# Patient Record
Sex: FEMALE | Race: WHITE | HISPANIC OR LATINO | Employment: FULL TIME | URBAN - METROPOLITAN AREA
[De-identification: names, ages, dates, MRNs, and addresses within clinical notes are randomized per-mention and may not be internally consistent; named-entity substitution may affect disease eponyms.]

---

## 2023-05-01 PROBLEM — N39.0 ACUTE UTI: Status: ACTIVE | Noted: 2023-05-01

## 2023-05-01 PROBLEM — Z86.010 HISTORY OF COLON POLYPS: Status: ACTIVE | Noted: 2023-05-01

## 2023-05-01 PROBLEM — F41.8 DEPRESSION WITH ANXIETY: Status: ACTIVE | Noted: 2023-05-01

## 2023-05-01 PROBLEM — J18.9 PNEUMONIA: Status: ACTIVE | Noted: 2023-05-01

## 2023-05-01 PROBLEM — M62.838 CERVICAL PARASPINOUS MUSCLE SPASM: Status: ACTIVE | Noted: 2023-05-01

## 2023-05-01 PROBLEM — R92.2 DENSE BREAST: Status: ACTIVE | Noted: 2023-05-01

## 2023-05-01 PROBLEM — J02.9 SORE THROAT: Status: ACTIVE | Noted: 2023-05-01

## 2023-05-01 PROBLEM — K62.1 RECTAL POLYP: Status: ACTIVE | Noted: 2023-05-01

## 2023-05-01 PROBLEM — R11.2 NAUSEA AND/OR VOMITING: Status: ACTIVE | Noted: 2023-05-01

## 2023-05-01 PROBLEM — R87.619 ABNORMAL PAP SMEAR OF CERVIX: Status: ACTIVE | Noted: 2023-05-01

## 2023-05-01 PROBLEM — R92.30 DENSE BREAST: Status: ACTIVE | Noted: 2023-05-01

## 2023-05-01 PROBLEM — K59.00 CONSTIPATION: Status: ACTIVE | Noted: 2023-05-01

## 2023-05-01 PROBLEM — F41.9 ANXIETY: Status: ACTIVE | Noted: 2023-05-01

## 2023-05-01 PROBLEM — B99.9 RECURRENT INFECTIONS: Status: ACTIVE | Noted: 2023-05-01

## 2023-05-01 PROBLEM — R06.2 WHEEZING: Status: ACTIVE | Noted: 2023-05-01

## 2023-05-01 PROBLEM — K76.0 NAFLD (NONALCOHOLIC FATTY LIVER DISEASE): Status: ACTIVE | Noted: 2023-05-01

## 2023-05-01 PROBLEM — M54.50 PAIN, LOW BACK: Status: ACTIVE | Noted: 2023-05-01

## 2023-05-01 PROBLEM — M54.16 LUMBAR RADICULOPATHY: Status: ACTIVE | Noted: 2023-05-01

## 2023-05-01 PROBLEM — N76.0 BACTERIAL VAGINOSIS: Status: ACTIVE | Noted: 2023-05-01

## 2023-05-01 PROBLEM — R82.90 ABNORMAL URINE ODOR: Status: ACTIVE | Noted: 2023-05-01

## 2023-05-01 PROBLEM — S16.1XXA CERVICAL STRAIN: Status: ACTIVE | Noted: 2023-05-01

## 2023-05-01 PROBLEM — M72.2 BILATERAL PLANTAR FASCIITIS: Status: ACTIVE | Noted: 2023-05-01

## 2023-05-01 PROBLEM — R16.0 HEPATOMEGALY: Status: ACTIVE | Noted: 2023-05-01

## 2023-05-01 PROBLEM — R06.02 SHORTNESS OF BREATH ON EXERTION: Status: ACTIVE | Noted: 2023-05-01

## 2023-05-01 PROBLEM — E55.9 VITAMIN D DEFICIENCY: Status: ACTIVE | Noted: 2023-05-01

## 2023-05-01 PROBLEM — Z20.822 SUSPECTED SEVERE ACUTE RESPIRATORY SYNDROME CORONAVIRUS 2 (SARS-COV-2) INFECTION: Status: ACTIVE | Noted: 2023-05-01

## 2023-05-01 PROBLEM — H66.93 BILATERAL OTITIS MEDIA: Status: ACTIVE | Noted: 2023-05-01

## 2023-05-01 PROBLEM — M16.12 OSTEOARTHRITIS OF LEFT HIP: Status: ACTIVE | Noted: 2023-05-01

## 2023-05-01 PROBLEM — R23.2 HOT FLASHES: Status: ACTIVE | Noted: 2023-05-01

## 2023-05-01 PROBLEM — B96.89 BACTERIAL VAGINOSIS: Status: ACTIVE | Noted: 2023-05-01

## 2023-05-01 PROBLEM — S92.153A AVULSION FRACTURE OF TALUS: Status: ACTIVE | Noted: 2023-05-01

## 2023-05-01 PROBLEM — G56.00 CTS (CARPAL TUNNEL SYNDROME): Status: ACTIVE | Noted: 2023-05-01

## 2023-05-01 PROBLEM — M25.559 ARTHRALGIA OF HIP: Status: ACTIVE | Noted: 2023-05-01

## 2023-05-01 PROBLEM — R63.5 WEIGHT GAIN: Status: ACTIVE | Noted: 2023-05-01

## 2023-05-01 PROBLEM — D69.6 THROMBOCYTOPENIA, UNSPECIFIED (CMS-HCC): Status: ACTIVE | Noted: 2023-05-01

## 2023-05-01 PROBLEM — J98.9 REACTIVE AIRWAY DISEASE THAT IS NOT ASTHMA: Status: ACTIVE | Noted: 2023-05-01

## 2023-05-01 PROBLEM — K21.9 GERD (GASTROESOPHAGEAL REFLUX DISEASE): Status: ACTIVE | Noted: 2023-05-01

## 2023-05-01 PROBLEM — A41.9 SEPSIS (MULTI): Status: ACTIVE | Noted: 2023-05-01

## 2023-05-01 PROBLEM — R35.0 URINARY FREQUENCY: Status: ACTIVE | Noted: 2023-05-01

## 2023-05-01 PROBLEM — R09.81 NASAL CONGESTION: Status: ACTIVE | Noted: 2023-05-01

## 2023-05-01 PROBLEM — E78.5 ELEVATED LIPIDS: Status: ACTIVE | Noted: 2023-05-01

## 2023-05-01 PROBLEM — J30.2 SEASONAL ALLERGIC RHINITIS: Status: ACTIVE | Noted: 2023-05-01

## 2023-05-01 PROBLEM — S93.409A ANKLE SPRAIN: Status: ACTIVE | Noted: 2023-05-01

## 2023-05-01 PROBLEM — J20.9 ACUTE BRONCHITIS: Status: ACTIVE | Noted: 2023-05-01

## 2023-05-01 PROBLEM — R53.83 OTHER FATIGUE: Status: ACTIVE | Noted: 2023-05-01

## 2023-05-01 PROBLEM — J06.9 URI, ACUTE: Status: ACTIVE | Noted: 2023-05-01

## 2023-05-01 PROBLEM — L50.0 ALLERGIC URTICARIA: Status: ACTIVE | Noted: 2023-05-01

## 2023-05-01 PROBLEM — S39.012A LUMBAR STRAIN: Status: ACTIVE | Noted: 2023-05-01

## 2023-05-01 PROBLEM — Z86.0100 HISTORY OF COLON POLYPS: Status: ACTIVE | Noted: 2023-05-01

## 2023-05-01 PROBLEM — M25.373 ANKLE INSTABILITY: Status: ACTIVE | Noted: 2023-05-01

## 2023-05-01 PROBLEM — M16.10 HIP ARTHRITIS: Status: ACTIVE | Noted: 2023-05-01

## 2023-05-01 PROBLEM — R07.81 RIB PAIN ON RIGHT SIDE: Status: ACTIVE | Noted: 2023-05-01

## 2023-05-01 PROBLEM — F51.02 ADJUSTMENT INSOMNIA: Status: ACTIVE | Noted: 2023-05-01

## 2023-05-01 PROBLEM — R79.89 ELEVATED LFTS: Status: ACTIVE | Noted: 2023-05-01

## 2023-05-01 PROBLEM — R10.11 ABDOMINAL PAIN, RUQ (RIGHT UPPER QUADRANT): Status: ACTIVE | Noted: 2023-05-01

## 2023-05-01 PROBLEM — M79.10 MYALGIA: Status: ACTIVE | Noted: 2023-05-01

## 2023-05-01 PROBLEM — M62.830 LUMBAR PARASPINAL MUSCLE SPASM: Status: ACTIVE | Noted: 2023-05-01

## 2023-05-01 PROBLEM — M79.603 PAIN OF UPPER EXTREMITY: Status: ACTIVE | Noted: 2023-05-01

## 2023-05-01 PROBLEM — A04.72 C. DIFFICILE DIARRHEA: Status: ACTIVE | Noted: 2023-05-01

## 2023-05-01 PROBLEM — R05.9 COUGH: Status: ACTIVE | Noted: 2023-05-01

## 2023-05-01 PROBLEM — R73.09 ELEVATED GLUCOSE: Status: ACTIVE | Noted: 2023-05-01

## 2023-05-01 PROBLEM — R60.0 LEG EDEMA: Status: ACTIVE | Noted: 2023-05-01

## 2023-05-01 PROBLEM — L23.7 POISON IVY DERMATITIS: Status: ACTIVE | Noted: 2023-05-01

## 2023-05-01 PROBLEM — R31.9 HEMATURIA: Status: ACTIVE | Noted: 2023-05-01

## 2023-05-01 RX ORDER — BROMPHENIRAMINE MALEATE, PSEUDOEPHEDRINE HYDROCHLORIDE, AND DEXTROMETHORPHAN HYDROBROMIDE 2; 30; 10 MG/5ML; MG/5ML; MG/5ML
10 SYRUP ORAL
COMMUNITY
Start: 2022-11-29 | End: 2023-05-03 | Stop reason: ALTCHOICE

## 2023-05-01 RX ORDER — PAROXETINE 10 MG/1
10 TABLET, FILM COATED ORAL EVERY 24 HOURS
COMMUNITY
Start: 2021-12-20 | End: 2023-05-03 | Stop reason: ALTCHOICE

## 2023-05-01 RX ORDER — ALBUTEROL SULFATE 90 UG/1
1 AEROSOL, METERED RESPIRATORY (INHALATION)
COMMUNITY
Start: 2020-02-18 | End: 2023-11-13 | Stop reason: SDUPTHER

## 2023-05-01 RX ORDER — BUPROPION HYDROCHLORIDE 150 MG/1
1 TABLET ORAL DAILY
COMMUNITY
Start: 2022-08-01 | End: 2023-05-03 | Stop reason: ALTCHOICE

## 2023-05-01 RX ORDER — PREDNISONE 20 MG/1
20 TABLET ORAL EVERY 24 HOURS
COMMUNITY
Start: 2021-12-01 | End: 2023-05-03 | Stop reason: ALTCHOICE

## 2023-05-01 RX ORDER — NAPROXEN 500 MG/1
500 TABLET ORAL EVERY 12 HOURS
COMMUNITY
Start: 2020-03-09 | End: 2024-04-10 | Stop reason: SDUPTHER

## 2023-05-01 RX ORDER — CYCLOBENZAPRINE HCL 10 MG
1 TABLET ORAL NIGHTLY PRN
COMMUNITY
Start: 2021-06-02 | End: 2024-01-17 | Stop reason: WASHOUT

## 2023-05-01 RX ORDER — BUDESONIDE AND FORMOTEROL FUMARATE DIHYDRATE 160; 4.5 UG/1; UG/1
2 AEROSOL RESPIRATORY (INHALATION) EVERY 12 HOURS
COMMUNITY
End: 2023-11-13 | Stop reason: SDUPTHER

## 2023-05-01 RX ORDER — BUDESONIDE AND FORMOTEROL FUMARATE DIHYDRATE 160; 4.5 UG/1; UG/1
2 AEROSOL RESPIRATORY (INHALATION) 2 TIMES DAILY
COMMUNITY
End: 2023-05-03 | Stop reason: ALTCHOICE

## 2023-05-01 RX ORDER — CETIRIZINE HYDROCHLORIDE 10 MG/1
10 TABLET ORAL DAILY
COMMUNITY
Start: 2023-01-24

## 2023-05-01 RX ORDER — ACETAMINOPHEN 500 MG
TABLET ORAL DAILY
COMMUNITY
Start: 2022-11-14

## 2023-05-01 RX ORDER — MULTIVITAMIN
1 TABLET ORAL DAILY
COMMUNITY

## 2023-05-03 ENCOUNTER — OFFICE VISIT (OUTPATIENT)
Dept: PRIMARY CARE | Facility: CLINIC | Age: 53
End: 2023-05-03
Payer: COMMERCIAL

## 2023-05-03 VITALS
TEMPERATURE: 98.2 F | SYSTOLIC BLOOD PRESSURE: 115 MMHG | WEIGHT: 180.9 LBS | HEIGHT: 63 IN | DIASTOLIC BLOOD PRESSURE: 80 MMHG | BODY MASS INDEX: 32.05 KG/M2 | HEART RATE: 84 BPM | OXYGEN SATURATION: 96 %

## 2023-05-03 DIAGNOSIS — R79.89 ELEVATED LFTS: ICD-10-CM

## 2023-05-03 DIAGNOSIS — E66.09 CLASS 1 OBESITY DUE TO EXCESS CALORIES WITHOUT SERIOUS COMORBIDITY WITH BODY MASS INDEX (BMI) OF 32.0 TO 32.9 IN ADULT: ICD-10-CM

## 2023-05-03 DIAGNOSIS — J45.902 ASTHMA WITH STATUS ASTHMATICUS, UNSPECIFIED ASTHMA SEVERITY, UNSPECIFIED WHETHER PERSISTENT (HHS-HCC): Primary | ICD-10-CM

## 2023-05-03 DIAGNOSIS — Z79.899 HIGH RISK MEDICATION USE: ICD-10-CM

## 2023-05-03 DIAGNOSIS — R73.09 ELEVATED GLUCOSE: ICD-10-CM

## 2023-05-03 DIAGNOSIS — R20.0 BILATERAL HAND NUMBNESS: ICD-10-CM

## 2023-05-03 DIAGNOSIS — K21.9 GASTROESOPHAGEAL REFLUX DISEASE WITHOUT ESOPHAGITIS: ICD-10-CM

## 2023-05-03 DIAGNOSIS — E55.9 VITAMIN D DEFICIENCY: ICD-10-CM

## 2023-05-03 DIAGNOSIS — F41.9 ANXIETY: ICD-10-CM

## 2023-05-03 DIAGNOSIS — E78.5 ELEVATED LIPIDS: ICD-10-CM

## 2023-05-03 DIAGNOSIS — M54.16 LUMBAR RADICULOPATHY: ICD-10-CM

## 2023-05-03 PROBLEM — S93.409A ANKLE SPRAIN: Status: RESOLVED | Noted: 2023-05-01 | Resolved: 2023-05-03

## 2023-05-03 PROBLEM — R60.0 LEG EDEMA: Status: RESOLVED | Noted: 2023-05-01 | Resolved: 2023-05-03

## 2023-05-03 PROBLEM — M79.603 PAIN OF UPPER EXTREMITY: Status: RESOLVED | Noted: 2023-05-01 | Resolved: 2023-05-03

## 2023-05-03 PROBLEM — M79.10 MYALGIA: Status: RESOLVED | Noted: 2023-05-01 | Resolved: 2023-05-03

## 2023-05-03 PROBLEM — N39.0 ACUTE UTI: Status: RESOLVED | Noted: 2023-05-01 | Resolved: 2023-05-03

## 2023-05-03 PROBLEM — S92.153A AVULSION FRACTURE OF TALUS: Status: RESOLVED | Noted: 2023-05-01 | Resolved: 2023-05-03

## 2023-05-03 PROBLEM — A41.9 SEPSIS (MULTI): Status: RESOLVED | Noted: 2023-05-01 | Resolved: 2023-05-03

## 2023-05-03 PROBLEM — M16.10 HIP ARTHRITIS: Status: RESOLVED | Noted: 2023-05-01 | Resolved: 2023-05-03

## 2023-05-03 PROBLEM — M25.559 ARTHRALGIA OF HIP: Status: RESOLVED | Noted: 2023-05-01 | Resolved: 2023-05-03

## 2023-05-03 PROBLEM — L50.0 ALLERGIC URTICARIA: Status: RESOLVED | Noted: 2023-05-01 | Resolved: 2023-05-03

## 2023-05-03 PROBLEM — R35.0 URINARY FREQUENCY: Status: RESOLVED | Noted: 2023-05-01 | Resolved: 2023-05-03

## 2023-05-03 PROBLEM — R05.9 COUGH: Status: RESOLVED | Noted: 2023-05-01 | Resolved: 2023-05-03

## 2023-05-03 PROBLEM — J30.2 SEASONAL ALLERGIC RHINITIS: Status: RESOLVED | Noted: 2023-05-01 | Resolved: 2023-05-03

## 2023-05-03 PROBLEM — B99.9 RECURRENT INFECTIONS: Status: RESOLVED | Noted: 2023-05-01 | Resolved: 2023-05-03

## 2023-05-03 PROBLEM — J02.9 SORE THROAT: Status: RESOLVED | Noted: 2023-05-01 | Resolved: 2023-05-03

## 2023-05-03 PROBLEM — R11.2 NAUSEA AND/OR VOMITING: Status: RESOLVED | Noted: 2023-05-01 | Resolved: 2023-05-03

## 2023-05-03 PROBLEM — J20.9 ACUTE BRONCHITIS: Status: RESOLVED | Noted: 2023-05-01 | Resolved: 2023-05-03

## 2023-05-03 PROBLEM — G56.00 CTS (CARPAL TUNNEL SYNDROME): Status: RESOLVED | Noted: 2023-05-01 | Resolved: 2023-05-03

## 2023-05-03 PROBLEM — M54.50 PAIN, LOW BACK: Status: RESOLVED | Noted: 2023-05-01 | Resolved: 2023-05-03

## 2023-05-03 PROBLEM — S16.1XXA CERVICAL STRAIN: Status: RESOLVED | Noted: 2023-05-01 | Resolved: 2023-05-03

## 2023-05-03 PROBLEM — Z20.822 SUSPECTED SEVERE ACUTE RESPIRATORY SYNDROME CORONAVIRUS 2 (SARS-COV-2) INFECTION: Status: RESOLVED | Noted: 2023-05-01 | Resolved: 2023-05-03

## 2023-05-03 PROBLEM — J06.9 URI, ACUTE: Status: RESOLVED | Noted: 2023-05-01 | Resolved: 2023-05-03

## 2023-05-03 PROBLEM — M25.373 ANKLE INSTABILITY: Status: RESOLVED | Noted: 2023-05-01 | Resolved: 2023-05-03

## 2023-05-03 PROBLEM — R82.90 ABNORMAL URINE ODOR: Status: RESOLVED | Noted: 2023-05-01 | Resolved: 2023-05-03

## 2023-05-03 PROBLEM — R92.2 DENSE BREAST: Status: RESOLVED | Noted: 2023-05-01 | Resolved: 2023-05-03

## 2023-05-03 PROBLEM — R07.81 RIB PAIN ON RIGHT SIDE: Status: RESOLVED | Noted: 2023-05-01 | Resolved: 2023-05-03

## 2023-05-03 PROBLEM — R09.81 NASAL CONGESTION: Status: RESOLVED | Noted: 2023-05-01 | Resolved: 2023-05-03

## 2023-05-03 PROBLEM — B96.89 BACTERIAL VAGINOSIS: Status: RESOLVED | Noted: 2023-05-01 | Resolved: 2023-05-03

## 2023-05-03 PROBLEM — R53.83 OTHER FATIGUE: Status: RESOLVED | Noted: 2023-05-01 | Resolved: 2023-05-03

## 2023-05-03 PROBLEM — R10.11 ABDOMINAL PAIN, RUQ (RIGHT UPPER QUADRANT): Status: RESOLVED | Noted: 2023-05-01 | Resolved: 2023-05-03

## 2023-05-03 PROBLEM — M62.838 CERVICAL PARASPINOUS MUSCLE SPASM: Status: RESOLVED | Noted: 2023-05-01 | Resolved: 2023-05-03

## 2023-05-03 PROBLEM — M72.2 BILATERAL PLANTAR FASCIITIS: Status: RESOLVED | Noted: 2023-05-01 | Resolved: 2023-05-03

## 2023-05-03 PROBLEM — R06.02 SHORTNESS OF BREATH ON EXERTION: Status: RESOLVED | Noted: 2023-05-01 | Resolved: 2023-05-03

## 2023-05-03 PROBLEM — L23.7 POISON IVY DERMATITIS: Status: RESOLVED | Noted: 2023-05-01 | Resolved: 2023-05-03

## 2023-05-03 PROBLEM — H66.93 BILATERAL OTITIS MEDIA: Status: RESOLVED | Noted: 2023-05-01 | Resolved: 2023-05-03

## 2023-05-03 PROBLEM — R31.9 HEMATURIA: Status: RESOLVED | Noted: 2023-05-01 | Resolved: 2023-05-03

## 2023-05-03 PROBLEM — J98.9 REACTIVE AIRWAY DISEASE THAT IS NOT ASTHMA: Status: RESOLVED | Noted: 2023-05-01 | Resolved: 2023-05-03

## 2023-05-03 PROBLEM — N76.0 BACTERIAL VAGINOSIS: Status: RESOLVED | Noted: 2023-05-01 | Resolved: 2023-05-03

## 2023-05-03 PROBLEM — R92.30 DENSE BREAST: Status: RESOLVED | Noted: 2023-05-01 | Resolved: 2023-05-03

## 2023-05-03 PROBLEM — J18.9 PNEUMONIA: Status: RESOLVED | Noted: 2023-05-01 | Resolved: 2023-05-03

## 2023-05-03 LAB
AMPHETAMINE (PRESENCE) IN URINE BY SCREEN METHOD: NORMAL
BARBITURATES PRESENCE IN URINE BY SCREEN METHOD: NORMAL
BENZODIAZEPINE (PRESENCE) IN URINE BY SCREEN METHOD: NORMAL
CANNABINOIDS IN URINE BY SCREEN METHOD: NORMAL
COCAINE (PRESENCE) IN URINE BY SCREEN METHOD: NORMAL
DRUG SCREEN COMMENT URINE: NORMAL
FENTANYL URINE: NORMAL
METHADONE (PRESENCE) IN URINE BY SCREEN METHOD: NORMAL
OPIATES (PRESENCE) IN URINE BY SCREEN METHOD: NORMAL
OXYCODONE (PRESENCE) IN URINE BY SCREEN METHOD: NORMAL
PHENCYCLIDINE (PRESENCE) IN URINE BY SCREEN METHOD: NORMAL

## 2023-05-03 PROCEDURE — 99214 OFFICE O/P EST MOD 30 MIN: CPT | Performed by: PHYSICIAN ASSISTANT

## 2023-05-03 PROCEDURE — 3008F BODY MASS INDEX DOCD: CPT | Performed by: PHYSICIAN ASSISTANT

## 2023-05-03 PROCEDURE — 1036F TOBACCO NON-USER: CPT | Performed by: PHYSICIAN ASSISTANT

## 2023-05-03 PROCEDURE — 80307 DRUG TEST PRSMV CHEM ANLYZR: CPT

## 2023-05-03 RX ORDER — PHENTERMINE HYDROCHLORIDE 37.5 MG/1
37.5 TABLET ORAL
Qty: 30 TABLET | Refills: 0 | Status: SHIPPED | OUTPATIENT
Start: 2023-05-03 | End: 2023-05-31 | Stop reason: SDUPTHER

## 2023-05-03 RX ORDER — METHYLPREDNISOLONE 4 MG/1
TABLET ORAL
COMMUNITY
Start: 2022-10-28 | End: 2023-05-03 | Stop reason: ALTCHOICE

## 2023-05-03 RX ORDER — PHENTERMINE HYDROCHLORIDE 37.5 MG/1
37.5 TABLET ORAL DAILY
COMMUNITY
Start: 2022-07-01 | End: 2023-05-03 | Stop reason: ALTCHOICE

## 2023-05-03 RX ORDER — ALPRAZOLAM 0.25 MG/1
0.25 TABLET ORAL NIGHTLY PRN
COMMUNITY

## 2023-05-03 RX ORDER — LANOLIN ALCOHOL/MO/W.PET/CERES
1 CREAM (GRAM) TOPICAL 2 TIMES DAILY
COMMUNITY
Start: 2023-01-24

## 2023-05-03 ASSESSMENT — PATIENT HEALTH QUESTIONNAIRE - PHQ9
SUM OF ALL RESPONSES TO PHQ9 QUESTIONS 1 AND 2: 0
1. LITTLE INTEREST OR PLEASURE IN DOING THINGS: NOT AT ALL
2. FEELING DOWN, DEPRESSED OR HOPELESS: NOT AT ALL

## 2023-05-24 PROBLEM — R00.2 PALPITATIONS: Status: ACTIVE | Noted: 2023-05-24

## 2023-05-24 PROBLEM — I20.9 ANGINA PECTORIS SYNDROME (CMS-HCC): Status: ACTIVE | Noted: 2023-05-24

## 2023-05-24 PROBLEM — R29.898 WEAKNESS OF BOTH HANDS: Status: ACTIVE | Noted: 2023-05-24

## 2023-05-24 PROBLEM — R22.32 MASS OF LEFT AXILLA: Status: ACTIVE | Noted: 2023-05-24

## 2023-05-24 PROBLEM — R22.2 MASS OF THORACIC STRUCTURE: Status: ACTIVE | Noted: 2023-05-24

## 2023-05-24 RX ORDER — ZINC GLUCONATE 50 MG
1 TABLET ORAL DAILY
COMMUNITY

## 2023-05-24 RX ORDER — ASCORBIC ACID 500 MG
1 TABLET ORAL DAILY
COMMUNITY

## 2023-05-24 RX ORDER — LANOLIN ALCOHOL/MO/W.PET/CERES
1 CREAM (GRAM) TOPICAL DAILY
COMMUNITY

## 2023-05-31 ENCOUNTER — OFFICE VISIT (OUTPATIENT)
Dept: PRIMARY CARE | Facility: CLINIC | Age: 53
End: 2023-05-31
Payer: COMMERCIAL

## 2023-05-31 VITALS
TEMPERATURE: 98.4 F | WEIGHT: 179.8 LBS | OXYGEN SATURATION: 97 % | DIASTOLIC BLOOD PRESSURE: 84 MMHG | BODY MASS INDEX: 31.86 KG/M2 | SYSTOLIC BLOOD PRESSURE: 122 MMHG | HEIGHT: 63 IN | HEART RATE: 95 BPM

## 2023-05-31 DIAGNOSIS — Z79.899 HIGH RISK MEDICATION USE: ICD-10-CM

## 2023-05-31 DIAGNOSIS — E66.09 CLASS 1 OBESITY DUE TO EXCESS CALORIES WITHOUT SERIOUS COMORBIDITY WITH BODY MASS INDEX (BMI) OF 32.0 TO 32.9 IN ADULT: Primary | ICD-10-CM

## 2023-05-31 PROCEDURE — 99213 OFFICE O/P EST LOW 20 MIN: CPT | Performed by: PHYSICIAN ASSISTANT

## 2023-05-31 PROCEDURE — 1036F TOBACCO NON-USER: CPT | Performed by: PHYSICIAN ASSISTANT

## 2023-05-31 PROCEDURE — 3008F BODY MASS INDEX DOCD: CPT | Performed by: PHYSICIAN ASSISTANT

## 2023-05-31 RX ORDER — PHENTERMINE HYDROCHLORIDE 37.5 MG/1
37.5 TABLET ORAL
Qty: 30 TABLET | Refills: 0 | Status: SHIPPED | OUTPATIENT
Start: 2023-05-31 | End: 2023-06-28 | Stop reason: SDUPTHER

## 2023-05-31 NOTE — PROGRESS NOTES
Subjective   Patient ID: Zainab Lagunas is a 53 y.o. female who presents for Follow-up on Adipex. This is the start on month 2.     HPI     OARRS:  Radha Quiroz PA-C on 5/31/2023 10:46 AM  I have personally reviewed the OARRS report for Zainab Lagunas. I have considered the risks of abuse, dependence, addiction and diversion    Is the patient prescribed a combination of a benzodiazepine and opioid?  No    Last Urine Drug Screen / ordered today: Yes  Recent Results (from the past 27470 hour(s))   Drug Screen, Urine With Reflex to Confirmation    Collection Time: 05/03/23 11:50 AM   Result Value Ref Range    DRUG SCREEN COMMENT URINE SEE BELOW     Amphetamine Screen, Urine PRESUMPTIVE NEGATIVE NEGATIVE    Barbiturate Screen, Urine PRESUMPTIVE NEGATIVE NEGATIVE    BENZODIAZEPINE (PRESENCE) IN URINE BY SCREEN METHOD PRESUMPTIVE NEGATIVE NEGATIVE    Cannabinoid Screen, Urine PRESUMPTIVE NEGATIVE NEGATIVE    Cocaine Screen, Urine PRESUMPTIVE NEGATIVE NEGATIVE    Fentanyl, Ur PRESUMPTIVE NEGATIVE NEGATIVE    Methadone Screen, Urine PRESUMPTIVE NEGATIVE NEGATIVE    Opiate Screen, Urine PRESUMPTIVE NEGATIVE NEGATIVE    Oxycodone Screen, Ur PRESUMPTIVE NEGATIVE NEGATIVE    PCP Screen, Urine PRESUMPTIVE NEGATIVE NEGATIVE     Results are as expected.     Controlled Substance Agreement:  Date of the Last Agreement: 5/3/23  Reviewed Controlled Substance Agreement including but not limited to the benefits, risks, and alternatives to treatment with a Controlled Substance medication(s).    Anorexiants:   What is the patient's goal of therapy? Weight loss  Is this being achieved with current treatment? Not really    I have assessed the presence or absence of contraindications, adverse effects, and indicators of possible substance abuse that would necessitate cessation of treatment utilizing controlled substance.    Patient has demonstrated continued efforts to lose weight, is dedicated to the treatment program and the  response to treatment.    Activities of Daily Living:   Is your overall impression that this patient is benefiting (symptom reduction outweighs side effects) from anorexiants therapy? Yes     1. Physical Functioning: Same  2. Family Relationship: Same  3. Social Relationship: Same  4. Mood: Same  5. Sleep Patterns: Same  6. Overall Function: Same        Obesity- BMI 33.  Wants to restart Adipex. Her last RX was 7/1/23, last pill 8/1/23. She denies SE from previous use.         OARRS:  Radha Quiroz PA-C on 5/3/2023  9:41 AM  I have personally reviewed the OARRS report for Zainab Lagunas. I have considered the risks of abuse, dependence, addiction and diversion     Is the patient prescribed a combination of a benzodiazepine and opioid?  No     Last Urine Drug Screen / ordered today: Yes  Recent Results         Recent Results (from the past 71535 hour(s))   Drug Screen, Urine With Reflex to Confirmation     Collection Time: 12/29/22  9:50 AM   Result Value Ref Range     DRUG SCREEN COMMENT URINE SEE BELOW       Amphetamine Screen, Urine PRESUMPTIVE NEGATIVE NEGATIVE     Barbiturate Screen, Urine PRESUMPTIVE NEGATIVE NEGATIVE     BENZODIAZEPINE (PRESENCE) IN URINE BY SCREEN METHOD PRESUMPTIVE NEGATIVE NEGATIVE     Cannabinoid Screen, Urine PRESUMPTIVE NEGATIVE NEGATIVE     Cocaine Screen, Urine PRESUMPTIVE NEGATIVE NEGATIVE     Fentanyl, Ur PRESUMPTIVE NEGATIVE NEGATIVE     Methadone Screen, Urine PRESUMPTIVE NEGATIVE NEGATIVE     Opiate Screen, Urine PRESUMPTIVE NEGATIVE NEGATIVE     Oxycodone Screen, Ur PRESUMPTIVE NEGATIVE NEGATIVE     PCP Screen, Urine PRESUMPTIVE NEGATIVE NEGATIVE         N/A     Controlled Substance Agreement:  Date of the Last Agreement: today, 5/3/23  Reviewed Controlled Substance Agreement including but not limited to the benefits, risks, and alternatives to treatment with a Controlled Substance medication(s).     Anorexiants:   What is the patient's goal of therapy? Weight loss, she  "has lost 4# since her last visit  Is this being achieved with current treatment? Yes, but too slow     I have assessed the patient's continuing efforts to lose weight.     Patient has demonstrated continued efforts to lose weight, is dedicated to the treatment program and the response to treatment.     Activities of Daily Living:   Is your overall impression that this patient is benefiting (symptom reduction outweighs side effects) from anorexiants therapy? N/A     1. Physical Functioning: Same  2. Family Relationship: Same  3. Social Relationship: Same  4. Mood: Same  5. Sleep Patterns: Same  6. Overall Function: Same     Mammo done Jan '23  DEXA done '21, due in '24  Colon '22, aldo 5 y        Review of Systems  Constitutional: Patient denies any fever, chills, loss of appetite, or unexplained weight loss.  Cardiovascular: Patient denies any chest pain, shortness of breath with exertion, tachycardia, palpitations, orthopnea, or paroxysmal nocturnal dyspnea.  Respiratory: Patient denies any cough, shortness breath, or wheezing.  Gastrointestinal patient denies any nausea, vomiting, diarrhea, constipation, melena, hematochezia, or reflux symptoms  Skin: Denies any rashes or skin lesions   Neurology: Patient denies any new motor or sensory losses.  Denies any numbness, tingling, weakness, and incoordination of the extremities.  Patient also denies any tremor, seizures, or gait instability.  Endocrinology: Denies any polyuria, polydipsia, polyphagia, or heat/cold intolerance.    Objective   /84   Pulse 95   Temp 36.9 °C (98.4 °F)   Ht 1.6 m (5' 3\")   Wt 81.6 kg (179 lb 12.8 oz)   SpO2 97%   BMI 31.85 kg/m²     Physical Exam  Gen. appearance: Alert and cooperative, in no acute distress, Well dev, well nourished obese female.  Neck: Supple and without adenopathy or rigidity.  There is no JVD at 90° and no carotid bruits are noted.  There is no thyromegaly, thyroid tenderness, or palpable thyroid " nodules.  Heart: Regular rate and rhythm without murmur or ectopy.  Lungs: Lungs are clear to auscultation bilaterally with good air exchange.  Skin: Good turgor, moist, warm and without rashes or lesions.  Neurological exam: Alert and oriented ×3, no tremor, normal gait.  Extremities: No clubbing, cyanosis, or edema    Assessment/Plan   Diagnoses and all orders for this visit:  Class 1 obesity due to excess calories without serious comorbidity with body mass index (BMI) of 32.0 to 32.9 in adult  -     phentermine (Adipex-P) 37.5 mg tablet; Take 1 tablet (37.5 mg) by mouth once daily in the morning. Take before meals.  This is pt's second month of Adipex. We discussed more protein in her diet and for her to con't with exercise. She only lost .2 lbs since her last visit. The goal is at least 5 lbs.     High risk medication use- Oaars, CSC, UDS are UTD.     RTC in 1 mo.    Patient understands that should they have testing outside   facilities that we may not receive the results and was told to call us if they have not heard from our office within a week after testing.    [facility] uses voice recognition technology for dictations. Sometimes the software misinterprets words. Please take this into account when reading this.

## 2023-06-02 ENCOUNTER — LAB (OUTPATIENT)
Dept: LAB | Facility: LAB | Age: 53
End: 2023-06-02
Payer: COMMERCIAL

## 2023-06-02 DIAGNOSIS — R79.89 ELEVATED LFTS: ICD-10-CM

## 2023-06-02 DIAGNOSIS — E78.5 ELEVATED LIPIDS: ICD-10-CM

## 2023-06-02 DIAGNOSIS — E55.9 VITAMIN D DEFICIENCY: ICD-10-CM

## 2023-06-02 DIAGNOSIS — R73.09 ELEVATED GLUCOSE: ICD-10-CM

## 2023-06-02 LAB
ALANINE AMINOTRANSFERASE (SGPT) (U/L) IN SER/PLAS: 15 U/L (ref 7–45)
ALBUMIN (G/DL) IN SER/PLAS: 4.5 G/DL (ref 3.4–5)
ALKALINE PHOSPHATASE (U/L) IN SER/PLAS: 62 U/L (ref 33–110)
ANION GAP IN SER/PLAS: 9 MMOL/L (ref 10–20)
ASPARTATE AMINOTRANSFERASE (SGOT) (U/L) IN SER/PLAS: 15 U/L (ref 9–39)
BASOPHILS (10*3/UL) IN BLOOD BY AUTOMATED COUNT: 0.08 X10E9/L (ref 0–0.1)
BASOPHILS/100 LEUKOCYTES IN BLOOD BY AUTOMATED COUNT: 1.1 % (ref 0–2)
BILIRUBIN TOTAL (MG/DL) IN SER/PLAS: 0.6 MG/DL (ref 0–1.2)
CALCIUM (MG/DL) IN SER/PLAS: 9.2 MG/DL (ref 8.6–10.3)
CARBON DIOXIDE, TOTAL (MMOL/L) IN SER/PLAS: 28 MMOL/L (ref 21–32)
CHLORIDE (MMOL/L) IN SER/PLAS: 103 MMOL/L (ref 98–107)
CHOLESTEROL (MG/DL) IN SER/PLAS: 207 MG/DL (ref 0–199)
CHOLESTEROL IN HDL (MG/DL) IN SER/PLAS: 46.3 MG/DL
CHOLESTEROL/HDL RATIO: 4.5
CREATININE (MG/DL) IN SER/PLAS: 0.63 MG/DL (ref 0.5–1.05)
EOSINOPHILS (10*3/UL) IN BLOOD BY AUTOMATED COUNT: 0.38 X10E9/L (ref 0–0.7)
EOSINOPHILS/100 LEUKOCYTES IN BLOOD BY AUTOMATED COUNT: 5.1 % (ref 0–6)
ERYTHROCYTE DISTRIBUTION WIDTH (RATIO) BY AUTOMATED COUNT: 13.6 % (ref 11.5–14.5)
ERYTHROCYTE MEAN CORPUSCULAR HEMOGLOBIN CONCENTRATION (G/DL) BY AUTOMATED: 32.5 G/DL (ref 32–36)
ERYTHROCYTE MEAN CORPUSCULAR VOLUME (FL) BY AUTOMATED COUNT: 96 FL (ref 80–100)
ERYTHROCYTES (10*6/UL) IN BLOOD BY AUTOMATED COUNT: 4.32 X10E12/L (ref 4–5.2)
ESTIMATED AVERAGE GLUCOSE FOR HBA1C: 111 MG/DL
GFR FEMALE: >90 ML/MIN/1.73M2
GLUCOSE (MG/DL) IN SER/PLAS: 93 MG/DL (ref 74–99)
HEMATOCRIT (%) IN BLOOD BY AUTOMATED COUNT: 41.6 % (ref 36–46)
HEMOGLOBIN (G/DL) IN BLOOD: 13.5 G/DL (ref 12–16)
HEMOGLOBIN A1C/HEMOGLOBIN TOTAL IN BLOOD: 5.5 %
IMMATURE GRANULOCYTES/100 LEUKOCYTES IN BLOOD BY AUTOMATED COUNT: 0.1 % (ref 0–0.9)
LDL: 145 MG/DL (ref 0–99)
LEUKOCYTES (10*3/UL) IN BLOOD BY AUTOMATED COUNT: 7.4 X10E9/L (ref 4.4–11.3)
LYMPHOCYTES (10*3/UL) IN BLOOD BY AUTOMATED COUNT: 2.22 X10E9/L (ref 1.2–4.8)
LYMPHOCYTES/100 LEUKOCYTES IN BLOOD BY AUTOMATED COUNT: 30 % (ref 13–44)
MONOCYTES (10*3/UL) IN BLOOD BY AUTOMATED COUNT: 0.6 X10E9/L (ref 0.1–1)
MONOCYTES/100 LEUKOCYTES IN BLOOD BY AUTOMATED COUNT: 8.1 % (ref 2–10)
NEUTROPHILS (10*3/UL) IN BLOOD BY AUTOMATED COUNT: 4.1 X10E9/L (ref 1.2–7.7)
NEUTROPHILS/100 LEUKOCYTES IN BLOOD BY AUTOMATED COUNT: 55.6 % (ref 40–80)
PLATELETS (10*3/UL) IN BLOOD AUTOMATED COUNT: 224 X10E9/L (ref 150–450)
POTASSIUM (MMOL/L) IN SER/PLAS: 3.6 MMOL/L (ref 3.5–5.3)
PROTEIN TOTAL: 7.3 G/DL (ref 6.4–8.2)
SODIUM (MMOL/L) IN SER/PLAS: 136 MMOL/L (ref 136–145)
THYROTROPIN (MIU/L) IN SER/PLAS BY DETECTION LIMIT <= 0.05 MIU/L: 2.61 MIU/L (ref 0.44–3.98)
TRIGLYCERIDE (MG/DL) IN SER/PLAS: 77 MG/DL (ref 0–149)
UREA NITROGEN (MG/DL) IN SER/PLAS: 12 MG/DL (ref 6–23)
VLDL: 15 MG/DL (ref 0–40)

## 2023-06-02 PROCEDURE — 80061 LIPID PANEL: CPT

## 2023-06-02 PROCEDURE — 80053 COMPREHEN METABOLIC PANEL: CPT

## 2023-06-02 PROCEDURE — 82652 VIT D 1 25-DIHYDROXY: CPT

## 2023-06-02 PROCEDURE — 84443 ASSAY THYROID STIM HORMONE: CPT

## 2023-06-02 PROCEDURE — 36415 COLL VENOUS BLD VENIPUNCTURE: CPT

## 2023-06-02 PROCEDURE — 85025 COMPLETE CBC W/AUTO DIFF WBC: CPT

## 2023-06-02 PROCEDURE — 83036 HEMOGLOBIN GLYCOSYLATED A1C: CPT

## 2023-06-05 ENCOUNTER — TELEPHONE (OUTPATIENT)
Dept: PRIMARY CARE | Facility: CLINIC | Age: 53
End: 2023-06-05
Payer: COMMERCIAL

## 2023-06-05 LAB — VITAMIN D 1,25-DIHYDROXY: 38.9 PG/ML (ref 19.9–79.3)

## 2023-06-05 NOTE — TELEPHONE ENCOUNTER
----- Message from Radha Quiroz PA-C sent at 6/5/2023  1:13 PM EDT -----  Please call the patient regarding her labs, they're stable. We can discuss more at her f/up if she has questions. She will keep all meds  the same.

## 2023-06-07 ENCOUNTER — TELEPHONE (OUTPATIENT)
Dept: PRIMARY CARE | Facility: CLINIC | Age: 53
End: 2023-06-07
Payer: COMMERCIAL

## 2023-06-07 NOTE — TELEPHONE ENCOUNTER
----- Message from Radha Quiroz PA-C sent at 6/7/2023  4:28 PM EDT -----  Regarding: nerve conduction study results.  Pls tell pt that her nerve conduction study showed mild left carpal tunnel.  They recommended that she try conservative management with hand therapy wrist splints.  If her symptoms worsen she is to get decompression or surgery on her left hand.  If it is persistent they suggest repeating it in 6 months.

## 2023-06-28 ENCOUNTER — OFFICE VISIT (OUTPATIENT)
Dept: PRIMARY CARE | Facility: CLINIC | Age: 53
End: 2023-06-28
Payer: COMMERCIAL

## 2023-06-28 VITALS
DIASTOLIC BLOOD PRESSURE: 79 MMHG | SYSTOLIC BLOOD PRESSURE: 117 MMHG | BODY MASS INDEX: 31.01 KG/M2 | WEIGHT: 175 LBS | HEART RATE: 88 BPM | HEIGHT: 63 IN | TEMPERATURE: 98.3 F

## 2023-06-28 DIAGNOSIS — Z79.899 HIGH RISK MEDICATION USE: ICD-10-CM

## 2023-06-28 DIAGNOSIS — E66.09 CLASS 1 OBESITY DUE TO EXCESS CALORIES WITHOUT SERIOUS COMORBIDITY WITH BODY MASS INDEX (BMI) OF 32.0 TO 32.9 IN ADULT: Primary | ICD-10-CM

## 2023-06-28 DIAGNOSIS — E78.5 ELEVATED LIPIDS: ICD-10-CM

## 2023-06-28 PROCEDURE — 99213 OFFICE O/P EST LOW 20 MIN: CPT | Performed by: PHYSICIAN ASSISTANT

## 2023-06-28 PROCEDURE — 1036F TOBACCO NON-USER: CPT | Performed by: PHYSICIAN ASSISTANT

## 2023-06-28 PROCEDURE — 3008F BODY MASS INDEX DOCD: CPT | Performed by: PHYSICIAN ASSISTANT

## 2023-06-28 RX ORDER — PHENTERMINE HYDROCHLORIDE 37.5 MG/1
37.5 TABLET ORAL
Qty: 30 TABLET | Refills: 0 | Status: SHIPPED | OUTPATIENT
Start: 2023-06-28 | End: 2024-01-17 | Stop reason: WASHOUT

## 2023-06-28 ASSESSMENT — PATIENT HEALTH QUESTIONNAIRE - PHQ9
2. FEELING DOWN, DEPRESSED OR HOPELESS: NOT AT ALL
1. LITTLE INTEREST OR PLEASURE IN DOING THINGS: NOT AT ALL
SUM OF ALL RESPONSES TO PHQ9 QUESTIONS 1 AND 2: 0

## 2023-06-28 NOTE — PROGRESS NOTES
Subjective   Patient ID: Zainab Lagunas is a 53 y.o. female who presents for Follow-up.    HPI     OARRS:  Radha Quiroz PA-C on 6/28/2023  3:18 PM  I have personally reviewed the OARRS report for Zainab Lagunas. I have considered the risks of abuse, dependence, addiction and diversion    Is the patient prescribed a combination of a benzodiazepine and opioid?  NO    Last Urine Drug Screen / ordered today: No  Recent Results (from the past 19680 hour(s))   Drug Screen, Urine With Reflex to Confirmation    Collection Time: 05/03/23 11:50 AM   Result Value Ref Range    DRUG SCREEN COMMENT URINE SEE BELOW     Amphetamine Screen, Urine PRESUMPTIVE NEGATIVE NEGATIVE    Barbiturate Screen, Urine PRESUMPTIVE NEGATIVE NEGATIVE    BENZODIAZEPINE (PRESENCE) IN URINE BY SCREEN METHOD PRESUMPTIVE NEGATIVE NEGATIVE    Cannabinoid Screen, Urine PRESUMPTIVE NEGATIVE NEGATIVE    Cocaine Screen, Urine PRESUMPTIVE NEGATIVE NEGATIVE    Fentanyl, Ur PRESUMPTIVE NEGATIVE NEGATIVE    Methadone Screen, Urine PRESUMPTIVE NEGATIVE NEGATIVE    Opiate Screen, Urine PRESUMPTIVE NEGATIVE NEGATIVE    Oxycodone Screen, Ur PRESUMPTIVE NEGATIVE NEGATIVE    PCP Screen, Urine PRESUMPTIVE NEGATIVE NEGATIVE     Results are as expected.     Controlled Substance Agreement:  Date of the Last Agreement: 5/3/23  Reviewed Controlled Substance Agreement including but not limited to the benefits, risks, and alternatives to treatment with a Controlled Substance medication(s).    Anorexiants:   What is the patient's goal of therapy? 10-15 # wt loss  Is this being achieved with current treatment? yes    I have assessed the patient's continuing efforts to lose weight., I have assessed the patient's dedication to the treatment program and the response to treatment., and I have assessed the presence or absence of contraindications, adverse effects, and indicators of possible substance abuse that would necessitate cessation of treatment utilizing  controlled substance.    Patient has demonstrated continued efforts to lose weight, is dedicated to the treatment program and the response to treatment. and I have assessed for the presence or absence of contraindications, adverse effects, and indicators of possible substance abuse that would necessitate cessation of treatment utilizing controlled substance.    Activities of Daily Living:   Is your overall impression that this patient is benefiting (symptom reduction outweighs side effects) from anorexiants therapy? Yes     1. Physical Functioning: Better  2. Family Relationship: Better  3. Social Relationship: Better  4. Mood: Better  5. Sleep Patterns: Better  6. Overall Function: Better        Labs done 6/2/23 here for rv.  HLD-  Rv'd labs and she is not wanting to go on meds for cholesterol. She is only wanting to do diet and exercise.      Obesity- BMI 33.  This is her last fill for Adipex (month #3). Her last RX was 5/31/23. She denies SE from previous use but  Dry mouth noted at this visit. She denies palpitations, CP, insomnia. Her goal is 5-8# with this last month.      Review of Systems  Constitutional: Patient denies any fever, chills, loss of appetite, or unexplained weight loss.  Cardiovascular: Patient denies any chest pain, shortness of breath with exertion, tachycardia, palpitations, orthopnea, or paroxysmal nocturnal dyspnea.  Respiratory: Patient denies any cough, shortness breath, or wheezing.  Gastrointestinal patient denies any nausea, vomiting, diarrhea, constipation, melena, hematochezia, or reflux symptoms  Skin: Denies any rashes or skin lesions   Neurology: Patient denies any new motor or sensory losses.  Denies any numbness, tingling, weakness, and incoordination of the extremities.  Patient also denies any tremor, seizures, or gait instability.  Endocrinology: Denies any polyuria, polydipsia, polyphagia, or heat/cold intolerance.    Objective   /79   Pulse 88   Temp 36.8 °C (98.3  "°F)   Ht 1.6 m (5' 3\")   Wt 79.4 kg (175 lb)   BMI 31.00 kg/m²     Physical Exam    Assessment/Plan     Diagnoses and all orders for this visit:  Class 1 obesity due to excess calories without serious comorbidity with body mass index (BMI) of 32.0 to 32.9 in adult  -     phentermine (Adipex-P) 37.5 mg tablet; Take 1 tablet (37.5 mg) by mouth once daily in the morning. Take before meals.    Patient will continue with diet exercise modifications, daily phentermine use over the next 30 days and return to the clinic in 4 weeks for reevaluation of weight.  We discussed the risks/benefits/side effects of Adipex at length again. She/He understands similar medications have had adverse effects on heart valves in the past and there is no guarantee that Adipex cannot have similar side effects. We discussed that the medication is potentially abusable and potentially addictive. We again discussed that she/he can only continue on the medication for 3 months before needing a 6 month drug-free interval. We discussed that we will need to discontinue the medication should she/he fail to lose weight.  I see no signs of substance abuse today    Hyperlipidemia-patient does not desire to go on cholesterol meds at this time.  Her LDL was 145.  She will continue to tighten up on diet and exercise modifications and we will continue to monitor.  She has no symptoms of unstable angina at this time.      Patient is to return to the clinic in 4 weeks    Patient understands that should they have testing outside   facilities that we may not receive the results and was told to call us if they have not heard from our office within a week after testing.    [facility] uses voice recognition technology for dictations. Sometimes the software misinterprets words. Please take this into account when reading this.           "

## 2023-07-08 ENCOUNTER — APPOINTMENT (OUTPATIENT)
Dept: GENERAL RADIOLOGY | Age: 53
End: 2023-07-08
Payer: COMMERCIAL

## 2023-07-08 ENCOUNTER — HOSPITAL ENCOUNTER (EMERGENCY)
Age: 53
Discharge: HOME | End: 2023-07-09
Attending: EMERGENCY MEDICINE
Payer: COMMERCIAL

## 2023-07-08 DIAGNOSIS — S92.301A CLOSED NONDISPLACED FRACTURE OF METATARSAL BONE OF RIGHT FOOT, UNSPECIFIED METATARSAL, INITIAL ENCOUNTER: ICD-10-CM

## 2023-07-08 DIAGNOSIS — S82.64XA CLOSED NONDISPLACED FRACTURE OF LATERAL MALLEOLUS OF RIGHT FIBULA, INITIAL ENCOUNTER: Primary | ICD-10-CM

## 2023-07-08 PROCEDURE — 6360000002 HC RX W HCPCS: Performed by: EMERGENCY MEDICINE

## 2023-07-08 PROCEDURE — 99284 EMERGENCY DEPT VISIT MOD MDM: CPT

## 2023-07-08 PROCEDURE — 73610 X-RAY EXAM OF ANKLE: CPT

## 2023-07-08 PROCEDURE — 73630 X-RAY EXAM OF FOOT: CPT

## 2023-07-08 PROCEDURE — 29505 APPLICATION LONG LEG SPLINT: CPT

## 2023-07-08 PROCEDURE — 6370000000 HC RX 637 (ALT 250 FOR IP): Performed by: EMERGENCY MEDICINE

## 2023-07-08 PROCEDURE — 96372 THER/PROPH/DIAG INJ SC/IM: CPT

## 2023-07-08 RX ORDER — OXYCODONE HYDROCHLORIDE AND ACETAMINOPHEN 5; 325 MG/1; MG/1
1-2 TABLET ORAL EVERY 6 HOURS PRN
Qty: 15 TABLET | Refills: 0 | Status: SHIPPED | OUTPATIENT
Start: 2023-07-08 | End: 2023-07-11

## 2023-07-08 RX ORDER — OXYCODONE HYDROCHLORIDE AND ACETAMINOPHEN 5; 325 MG/1; MG/1
2 TABLET ORAL ONCE
Status: COMPLETED | OUTPATIENT
Start: 2023-07-08 | End: 2023-07-08

## 2023-07-08 RX ORDER — PHENTERMINE HYDROCHLORIDE 37.5 MG/1
37.5 CAPSULE ORAL EVERY MORNING
COMMUNITY

## 2023-07-08 RX ORDER — KETOROLAC TROMETHAMINE 10 MG/1
10 TABLET, FILM COATED ORAL EVERY 6 HOURS PRN
Qty: 20 TABLET | Refills: 0 | Status: SHIPPED | OUTPATIENT
Start: 2023-07-08

## 2023-07-08 RX ORDER — MORPHINE SULFATE 4 MG/ML
4 INJECTION, SOLUTION INTRAMUSCULAR; INTRAVENOUS ONCE
Status: COMPLETED | OUTPATIENT
Start: 2023-07-08 | End: 2023-07-08

## 2023-07-08 RX ORDER — ACETAMINOPHEN 500 MG
1000 TABLET ORAL
Status: COMPLETED | OUTPATIENT
Start: 2023-07-08 | End: 2023-07-08

## 2023-07-08 RX ORDER — CYCLOBENZAPRINE HCL 10 MG
10 TABLET ORAL 3 TIMES DAILY PRN
Qty: 30 TABLET | Refills: 0 | Status: SHIPPED | OUTPATIENT
Start: 2023-07-08 | End: 2023-07-18

## 2023-07-08 RX ADMIN — ACETAMINOPHEN 1000 MG: 500 TABLET ORAL at 22:39

## 2023-07-08 RX ADMIN — MORPHINE SULFATE 4 MG: 4 INJECTION, SOLUTION INTRAMUSCULAR; INTRAVENOUS at 22:39

## 2023-07-08 RX ADMIN — OXYCODONE HYDROCHLORIDE AND ACETAMINOPHEN 2 TABLET: 5; 325 TABLET ORAL at 23:53

## 2023-07-08 ASSESSMENT — ENCOUNTER SYMPTOMS
SHORTNESS OF BREATH: 0
COUGH: 0
ABDOMINAL DISTENTION: 0
EYE PAIN: 0
DIARRHEA: 0
PHOTOPHOBIA: 0
SINUS PRESSURE: 0
BACK PAIN: 0
APNEA: 0
ABDOMINAL PAIN: 0
COLOR CHANGE: 0
SORE THROAT: 0
RHINORRHEA: 0
CONSTIPATION: 0
WHEEZING: 0
VOMITING: 0
NAUSEA: 0

## 2023-07-08 ASSESSMENT — PAIN DESCRIPTION - PAIN TYPE: TYPE: ACUTE PAIN

## 2023-07-08 ASSESSMENT — PAIN DESCRIPTION - ORIENTATION
ORIENTATION: RIGHT
ORIENTATION: RIGHT

## 2023-07-08 ASSESSMENT — LIFESTYLE VARIABLES
HOW MANY STANDARD DRINKS CONTAINING ALCOHOL DO YOU HAVE ON A TYPICAL DAY: 1 OR 2
HOW OFTEN DO YOU HAVE A DRINK CONTAINING ALCOHOL: 2-4 TIMES A MONTH

## 2023-07-08 ASSESSMENT — PAIN DESCRIPTION - ONSET: ONSET: SUDDEN

## 2023-07-08 ASSESSMENT — PAIN SCALES - GENERAL
PAINLEVEL_OUTOF10: 10
PAINLEVEL_OUTOF10: 9

## 2023-07-08 ASSESSMENT — PAIN DESCRIPTION - FREQUENCY: FREQUENCY: CONTINUOUS

## 2023-07-08 ASSESSMENT — PAIN DESCRIPTION - LOCATION
LOCATION: ANKLE;FOOT
LOCATION: ANKLE

## 2023-07-08 ASSESSMENT — PAIN DESCRIPTION - DESCRIPTORS: DESCRIPTORS: THROBBING

## 2023-07-09 VITALS
SYSTOLIC BLOOD PRESSURE: 128 MMHG | DIASTOLIC BLOOD PRESSURE: 86 MMHG | BODY MASS INDEX: 30.12 KG/M2 | HEIGHT: 63 IN | TEMPERATURE: 97.8 F | OXYGEN SATURATION: 96 % | HEART RATE: 73 BPM | WEIGHT: 170 LBS | RESPIRATION RATE: 18 BRPM

## 2023-07-09 ASSESSMENT — ENCOUNTER SYMPTOMS
BACK PAIN: 0
CONSTIPATION: 0
WHEEZING: 0
PHOTOPHOBIA: 0
COLOR CHANGE: 0
COUGH: 0
SINUS PRESSURE: 0
EYE PAIN: 0
DIARRHEA: 0
APNEA: 0
NAUSEA: 0
SORE THROAT: 0
RHINORRHEA: 0
ABDOMINAL PAIN: 0
SHORTNESS OF BREATH: 0
ABDOMINAL DISTENTION: 0
VOMITING: 0

## 2023-07-31 ENCOUNTER — APPOINTMENT (OUTPATIENT)
Dept: PRIMARY CARE | Facility: CLINIC | Age: 53
End: 2023-07-31
Payer: COMMERCIAL

## 2023-10-06 ENCOUNTER — OFFICE VISIT (OUTPATIENT)
Dept: ORTHOPEDIC SURGERY | Facility: CLINIC | Age: 53
End: 2023-10-06
Payer: COMMERCIAL

## 2023-10-06 ENCOUNTER — ANCILLARY PROCEDURE (OUTPATIENT)
Dept: RADIOLOGY | Facility: CLINIC | Age: 53
End: 2023-10-06
Payer: COMMERCIAL

## 2023-10-06 VITALS — BODY MASS INDEX: 30.12 KG/M2 | HEIGHT: 63 IN | WEIGHT: 170 LBS

## 2023-10-06 DIAGNOSIS — M25.571 ACUTE RIGHT ANKLE PAIN: ICD-10-CM

## 2023-10-06 DIAGNOSIS — M79.671 FOOT PAIN, RIGHT: ICD-10-CM

## 2023-10-06 DIAGNOSIS — S92.354A NONDISPLACED FRACTURE OF FIFTH METATARSAL BONE, RIGHT FOOT, INITIAL ENCOUNTER FOR CLOSED FRACTURE: Primary | ICD-10-CM

## 2023-10-06 PROCEDURE — 73630 X-RAY EXAM OF FOOT: CPT | Mod: RIGHT SIDE | Performed by: STUDENT IN AN ORGANIZED HEALTH CARE EDUCATION/TRAINING PROGRAM

## 2023-10-06 PROCEDURE — 1036F TOBACCO NON-USER: CPT | Performed by: STUDENT IN AN ORGANIZED HEALTH CARE EDUCATION/TRAINING PROGRAM

## 2023-10-06 PROCEDURE — 73610 X-RAY EXAM OF ANKLE: CPT | Mod: RIGHT SIDE | Performed by: STUDENT IN AN ORGANIZED HEALTH CARE EDUCATION/TRAINING PROGRAM

## 2023-10-06 PROCEDURE — 3008F BODY MASS INDEX DOCD: CPT | Performed by: STUDENT IN AN ORGANIZED HEALTH CARE EDUCATION/TRAINING PROGRAM

## 2023-10-06 PROCEDURE — 99213 OFFICE O/P EST LOW 20 MIN: CPT | Performed by: STUDENT IN AN ORGANIZED HEALTH CARE EDUCATION/TRAINING PROGRAM

## 2023-10-06 PROCEDURE — 73630 X-RAY EXAM OF FOOT: CPT | Mod: RT,FY

## 2023-10-06 PROCEDURE — 73610 X-RAY EXAM OF ANKLE: CPT | Mod: RT,FY

## 2023-10-06 PROCEDURE — 99024 POSTOP FOLLOW-UP VISIT: CPT | Performed by: STUDENT IN AN ORGANIZED HEALTH CARE EDUCATION/TRAINING PROGRAM

## 2023-10-06 ASSESSMENT — PAIN SCALES - GENERAL: PAINLEVEL_OUTOF10: 0 - NO PAIN

## 2023-10-06 ASSESSMENT — PAIN - FUNCTIONAL ASSESSMENT: PAIN_FUNCTIONAL_ASSESSMENT: 0-10

## 2023-10-06 NOTE — PROGRESS NOTES
History of Present Illness   53-year-old female presents today for evaluation of her right foot and ankle.  Well-known to me for a nondisplaced lateral malleolus fracture as well as base of fifth metatarsal fracture.  Presents today for repeat x-rays to ensure interval healing.  Date of injury 7/8/2023.  Presents today for 3-month follow-up also presents with new problem of the second MTP joint pain.  States that wearing a walking boot does help quite a bit.  States that she just did get a new pair of Yee for which she just started wearing.     Review of Systems   GENERAL: Negative for malaise, significant weight loss, fever  MUSCULOSKELETAL: see HPI  NEURO:  Negative     Physical Exam  General: No acute distress, alert and oriented x3     Imaging  General: No acute distress alert and orient x3  examination of the foot and ankle: Nontender palpation about the lateral malleolus or base of fifth metatarsal.  Ambulates with mild antalgic gait exquisite tenderness palpation about the second MTP joint.  Pain with stress varus and valgus stress at the MTP joint as well nontender to palpation about the second metatarsal shaft.  Overlying skin is clean dry intact mild edema about the foot.     Assessment   53-year-old female with healed lateral malleolus fracture base of fifth metatarsal fracture with new problem left second MTP capsulitis     Plan  X-rays reviewed with patient in detail today  Discussed importance of supportive shoe wear regarding second MTP capsulitis  We will also provide a prescription for podiatry team for evaluation of this  Recommend meloxicam as already prescribed  Range of motion as tolerated activities as tolerated using pain as a guide  Follow-up as needed.

## 2023-10-20 ENCOUNTER — APPOINTMENT (OUTPATIENT)
Dept: RADIOLOGY | Facility: HOSPITAL | Age: 53
End: 2023-10-20
Payer: COMMERCIAL

## 2023-10-24 ENCOUNTER — OFFICE VISIT (OUTPATIENT)
Dept: PODIATRY | Facility: CLINIC | Age: 53
End: 2023-10-24
Payer: COMMERCIAL

## 2023-10-24 DIAGNOSIS — S92.901S: Primary | ICD-10-CM

## 2023-10-24 PROCEDURE — 99203 OFFICE O/P NEW LOW 30 MIN: CPT | Performed by: PODIATRIST

## 2023-10-24 PROCEDURE — 3008F BODY MASS INDEX DOCD: CPT | Performed by: PODIATRIST

## 2023-10-24 PROCEDURE — 1036F TOBACCO NON-USER: CPT | Performed by: PODIATRIST

## 2023-10-24 NOTE — PROGRESS NOTES
History Of Present Illness  Zainab Lagunas is a 53 y.o. female presenting with chief complaint of:  Transition of care  History of right foot fracture 7/8/2023. Was wearing boot until August 18,2023 with some relief. Patient still having top of foot.   Pt did wear a boot for long period of time. She did got to PT. She has extreme pain when she bears weight for steps in the morning.  Past Medical History  She has a past medical history of Anxiety disorder, unspecified (12/29/2022), Contact with and (suspected) exposure to covid-19 (11/29/2022), Fatty (change of) liver, not elsewhere classified (07/23/2021), Hypoesthesia of skin, Personal history of other diseases of the musculoskeletal system and connective tissue, Personal history of other diseases of the respiratory system (11/17/2020), and Pneumonia, unspecified organism (01/07/2020).    Surgical History  She has a past surgical history that includes Hysteroscopy (02/15/2016); Tubal ligation (02/15/2016); and Other surgical history (02/18/2020).     Social History  She reports that she has never smoked. She has never used smokeless tobacco. She reports current alcohol use. No history on file for drug use.    Family History  No family history on file.     Allergies  Other    Medications  Current Outpatient Medications   Medication Sig Dispense Refill    albuterol 90 mcg/actuation inhaler Inhale 1 puff 3 times a day.      ALPRAZolam (Xanax) 0.25 mg tablet Take 1 tablet (0.25 mg) by mouth as needed at bedtime for anxiety.      ascorbic acid (Vitamin C) 500 mg tablet Take 1 tablet (500 mg) by mouth once daily.      budesonide-formoteroL (Symbicort) 160-4.5 mcg/actuation inhaler Inhale 2 puffs every 12 hours.      cetirizine (ZyrTEC) 10 mg tablet Take 1 tablet (10 mg) by mouth once daily.      cholecalciferol (Vitamin D-3) 5,000 Units tablet Take by mouth once daily.      cyanocobalamin (Vitamin B-12) 1,000 mcg tablet Take 1 tablet (1,000 mcg) by mouth once daily.       cyclobenzaprine (Flexeril) 10 mg tablet Take 1 tablet (10 mg) by mouth as needed at bedtime.      fish oil concentrate (Omega-3) 120-180 mg capsule Take 1 capsule (1 g) by mouth once daily.      magnesium oxide (Mag-Ox) 400 mg (241.3 mg magnesium) tablet Take 1 tablet (400 mg) by mouth 2 times a day.      multivitamin tablet Take 1 tablet by mouth once daily.      naproxen (Naprosyn) 500 mg tablet Take 1 tablet (500 mg) by mouth every 12 hours.      zinc gluconate 50 mg tablet Take 1 tablet (50 mg) by mouth once daily.      loratadine 10 mg capsule Take by mouth.      phentermine (Adipex-P) 37.5 mg tablet Take 1 tablet (37.5 mg) by mouth once daily in the morning. Take before meals. 30 tablet 0     No current facility-administered medications for this visit.       Review of Systems    REVIEW OF SYSTEMS  GENERAL:  Negative for malaise, significant weight loss, fever  CARDIOVASCULAR: leg swelling   MUSCULOSKELETAL:  Negative for joint pain or swelling, back pain, and muscle pain.  SKIN:  Negative for lesions, rash, and itching  PSYCH:  Negative for sleep disturbance, mood disorder and recent psychosocial stressors  NEURO: Negative, denies any burning, tingling or numbness     Objective:   Vasc: DP and PT pulses are palpable bilateral.  CFT is less than 3 seconds bilateral.  Skin temperature is warm to cool proximal to distal bilateral.      Neuro:  Light touch is intact to the foot bilateral.  Protective sensation is intact to the foot when tested with the 5.07 SWM bilateral.  There is no clonus noted.  The hallux is downgoing bilateral.      Derm: Nails are normal. Skin is supple with normal texture and turgor noted.  Webspaces are clean, dry and intact bilateral.  There are no hyperkeratoses, ulcerations, verruca or other lesions noted.      Ortho: Muscle strength is 5/5 for all pedal groups tested.  Ankle joint, subtalar joint, 1st MPJ and lesser MPJ ROM is full and without pain or crepitus.  The foot type is  rectus bilateral off weight bearing.  There are no structural deformities noted.  Patient has a edema and erythema forefoot right.  With dorsiflexion of her toes.  There is point area pain.    Assessment/Plan     Diagnoses and all orders for this visit:  Fracture of foot, right, sequela  -     MR foot right w and wo IV contrast; Future      Since patient has failed to alleviate her pain with Therapy, I feel an MRI is           Tiesha Alex CMA

## 2023-10-25 ENCOUNTER — APPOINTMENT (OUTPATIENT)
Dept: PODIATRY | Facility: CLINIC | Age: 53
End: 2023-10-25
Payer: COMMERCIAL

## 2023-11-08 ENCOUNTER — HOSPITAL ENCOUNTER (OUTPATIENT)
Dept: RADIOLOGY | Facility: HOSPITAL | Age: 53
Discharge: HOME | End: 2023-11-08
Payer: COMMERCIAL

## 2023-11-08 DIAGNOSIS — S92.901S: ICD-10-CM

## 2023-11-08 PROCEDURE — 73720 MRI LWR EXTREMITY W/O&W/DYE: CPT | Mod: RIGHT SIDE | Performed by: RADIOLOGY

## 2023-11-08 PROCEDURE — 73720 MRI LWR EXTREMITY W/O&W/DYE: CPT | Mod: RT

## 2023-11-08 PROCEDURE — 2550000001 HC RX 255 CONTRASTS: Performed by: PODIATRIST

## 2023-11-08 PROCEDURE — A9575 INJ GADOTERATE MEGLUMI 0.1ML: HCPCS | Performed by: PODIATRIST

## 2023-11-08 RX ORDER — GADOTERATE MEGLUMINE 376.9 MG/ML
0.2 INJECTION INTRAVENOUS
Status: COMPLETED | OUTPATIENT
Start: 2023-11-08 | End: 2023-11-08

## 2023-11-08 RX ADMIN — GADOTERATE MEGLUMINE 15 ML: 376.9 INJECTION INTRAVENOUS at 17:04

## 2023-11-13 ENCOUNTER — OFFICE VISIT (OUTPATIENT)
Dept: PRIMARY CARE | Facility: CLINIC | Age: 53
End: 2023-11-13
Payer: COMMERCIAL

## 2023-11-13 VITALS
BODY MASS INDEX: 31.98 KG/M2 | HEART RATE: 88 BPM | TEMPERATURE: 98.3 F | HEIGHT: 63 IN | WEIGHT: 180.5 LBS | SYSTOLIC BLOOD PRESSURE: 136 MMHG | DIASTOLIC BLOOD PRESSURE: 83 MMHG | OXYGEN SATURATION: 93 %

## 2023-11-13 DIAGNOSIS — S46.912A MUSCLE STRAIN OF LEFT SCAPULAR REGION, INITIAL ENCOUNTER: Primary | ICD-10-CM

## 2023-11-13 DIAGNOSIS — J45.20 MILD INTERMITTENT ASTHMA WITHOUT COMPLICATION (HHS-HCC): ICD-10-CM

## 2023-11-13 PROCEDURE — 99213 OFFICE O/P EST LOW 20 MIN: CPT | Performed by: PHYSICIAN ASSISTANT

## 2023-11-13 PROCEDURE — 3008F BODY MASS INDEX DOCD: CPT | Performed by: PHYSICIAN ASSISTANT

## 2023-11-13 PROCEDURE — 1036F TOBACCO NON-USER: CPT | Performed by: PHYSICIAN ASSISTANT

## 2023-11-13 RX ORDER — CYCLOBENZAPRINE HCL 10 MG
10 TABLET ORAL NIGHTLY PRN
Qty: 30 TABLET | Refills: 2 | Status: SHIPPED | OUTPATIENT
Start: 2023-11-13 | End: 2024-07-10

## 2023-11-13 RX ORDER — ALBUTEROL SULFATE 90 UG/1
1 AEROSOL, METERED RESPIRATORY (INHALATION)
Qty: 18 G | Refills: 3 | Status: SHIPPED | OUTPATIENT
Start: 2023-11-13

## 2023-11-13 RX ORDER — NAPROXEN 500 MG/1
500 TABLET ORAL EVERY 12 HOURS
Qty: 30 TABLET | Refills: 0 | Status: CANCELLED | OUTPATIENT
Start: 2023-11-13

## 2023-11-13 RX ORDER — BUDESONIDE AND FORMOTEROL FUMARATE DIHYDRATE 160; 4.5 UG/1; UG/1
2 AEROSOL RESPIRATORY (INHALATION) EVERY 12 HOURS
Qty: 1 EACH | Refills: 3 | Status: SHIPPED | OUTPATIENT
Start: 2023-11-13

## 2023-11-13 NOTE — PROGRESS NOTES
"Subjective   Patient ID: Zainab Lagunas is a 53 y.o. female who presents for Shoulder Pain.    HPI       Patient states she went to sneeze and suddenly felt a pop of the left shoulder about 1 month ago  Second week of Oct.   When laying or moving it hurts pt states.  Pain is worse with sneezing, movement  Pain is located in the mid L shoulder blade, touching can make it hurt  Pain is made better by standing up straight, message.   OTC meds tried tylenol, already on Mobic, capsasin creams no help.     Broke her foot in July- broken in 3 places, in a boot and now seeing podiatry.   Seeing Podiatrist again on Friday.       Asthma- well controlled on symbicort and as needed Proair. Needs refills.    Due for a pap w/in 3 mo .     Review of Systems  Constitutional: Patient denies any fever, chills, loss of appetite, or unexplained weight loss.  Cardiovascular: Patient denies any chest pain, shortness of breath with exertion, tachycardia, palpitations, orthopnea, or paroxysmal nocturnal dyspnea.  Respiratory: Patient denies any cough, shortness breath, or wheezing.  Gastrointestinal patient denies any nausea, vomiting, diarrhea, constipation, melena, hematochezia, or reflux symptoms  Skin: Denies any rashes or skin lesions   Neurology: Patient denies any new motor or sensory losses.  Denies any numbness, tingling, weakness, and incoordination of the extremities.  Patient also denies any tremor, seizures, or gait instability.  Endocrinology: Denies any polyuria, polydipsia, polyphagia, or heat/cold intolerance.    Objective   /83   Pulse 88   Temp 36.8 °C (98.3 °F)   Ht 1.6 m (5' 3\")   Wt 81.9 kg (180 lb 8 oz)   SpO2 93%   BMI 31.97 kg/m²     Physical Exam  Gen. appearance: Alert and cooperative, in no acute distress, Velp, well-nourished female.  Neck: Supple and without adenopathy or rigidity.  There is no JVD at 90° and no carotid bruits are noted.  There is no thyromegaly, thyroid tenderness, or " palpable thyroid nodules.  Heart: Regular rate and rhythm without murmur or ectopy.  Lungs: Lungs are clear to auscultation bilaterally with good air exchange.  Skin: Good turgor, moist, warm and without rashes or lesions.  Muscles overlying the mid scapula tender to palpation full range of motion of arm, neck, shoulder  Neurological exam: Alert and oriented ×3, no tremor, normal gait.  Extremities: No clubbing, cyanosis, or edema    Assessment/Plan   Diagnoses and all orders for this visit:  Muscle strain of left scapular region, initial encounter  -     cyclobenzaprine (Flexeril) 10 mg tablet; Take 1 tablet (10 mg) by mouth as needed at bedtime for muscle spasms.  She has lidocaine patches at home and she will start applying those every 12 hours.  Warmth, gentle stretching were also encouraged.  If she is not improved within 6 weeks she is to return to the clinic for further evaluation.    Mild intermittent asthma without complication  -     albuterol 90 mcg/actuation inhaler; Inhale 1 puff 3 times a day.  -     budesonide-formoteroL (Symbicort) 160-4.5 mcg/actuation inhaler; Inhale 2 puffs every 12 hours.  Patient symptoms are mild, intermittent and well managed with the current use of Symbicort and as needed albuterol.  She will continue current dosing and we will continue to monitor.    Pt is to follow-up in 3 months for a Pap and routine 3-month follow-up.    Patient understands that should they have testing outside   facilities that we may not receive the results and was told to call us if they have not heard from our office within a week after testing.    [facility] uses voice recognition technology for dictations. Sometimes the software misinterprets words. Please take this into account when reading this.

## 2023-11-14 ENCOUNTER — APPOINTMENT (OUTPATIENT)
Dept: RADIOLOGY | Facility: HOSPITAL | Age: 53
End: 2023-11-14
Payer: COMMERCIAL

## 2023-11-16 ENCOUNTER — APPOINTMENT (OUTPATIENT)
Dept: PODIATRY | Facility: CLINIC | Age: 53
End: 2023-11-16
Payer: COMMERCIAL

## 2023-11-17 ENCOUNTER — OFFICE VISIT (OUTPATIENT)
Dept: PODIATRY | Facility: CLINIC | Age: 53
End: 2023-11-17
Payer: COMMERCIAL

## 2023-11-17 DIAGNOSIS — M84.374G STRESS FRACTURE OF RIGHT FOOT WITH DELAYED HEALING, SUBSEQUENT ENCOUNTER: ICD-10-CM

## 2023-11-17 DIAGNOSIS — S92.301G CLOSED AVULSION FRACTURE OF METATARSAL BONE OF RIGHT FOOT WITH DELAYED HEALING, SUBSEQUENT ENCOUNTER: Primary | ICD-10-CM

## 2023-11-17 PROCEDURE — 1036F TOBACCO NON-USER: CPT | Performed by: PODIATRIST

## 2023-11-17 PROCEDURE — 99213 OFFICE O/P EST LOW 20 MIN: CPT | Performed by: PODIATRIST

## 2023-11-17 PROCEDURE — 3008F BODY MASS INDEX DOCD: CPT | Performed by: PODIATRIST

## 2023-11-17 NOTE — PROGRESS NOTES
History Of Present Illness  Zainab Lagunas is a 53 y.o. female presenting with chief complaint of:  Review MRI-patient is still having an increased amount of pain in her feet.  Past Medical History  She has a past medical history of Anxiety disorder, unspecified (12/29/2022), Contact with and (suspected) exposure to covid-19 (11/29/2022), Fatty (change of) liver, not elsewhere classified (07/23/2021), Hypoesthesia of skin, Personal history of other diseases of the musculoskeletal system and connective tissue, Personal history of other diseases of the respiratory system (11/17/2020), and Pneumonia, unspecified organism (01/07/2020).    Surgical History  She has a past surgical history that includes Hysteroscopy (02/15/2016); Tubal ligation (02/15/2016); and Other surgical history (02/18/2020).     Social History  She reports that she has never smoked. She has never used smokeless tobacco. She reports current alcohol use. She reports that she does not use drugs.    Family History  No family history on file.     Allergies  Other    Medications  Current Outpatient Medications   Medication Sig Dispense Refill    albuterol 90 mcg/actuation inhaler Inhale 1 puff 3 times a day. 18 g 3    ALPRAZolam (Xanax) 0.25 mg tablet Take 1 tablet (0.25 mg) by mouth as needed at bedtime for anxiety.      ascorbic acid (Vitamin C) 500 mg tablet Take 1 tablet (500 mg) by mouth once daily.      budesonide-formoteroL (Symbicort) 160-4.5 mcg/actuation inhaler Inhale 2 puffs every 12 hours. 1 each 3    cetirizine (ZyrTEC) 10 mg tablet Take 1 tablet (10 mg) by mouth once daily.      cholecalciferol (Vitamin D-3) 5,000 Units tablet Take by mouth once daily.      cyanocobalamin (Vitamin B-12) 1,000 mcg tablet Take 1 tablet (1,000 mcg) by mouth once daily.      cyclobenzaprine (Flexeril) 10 mg tablet Take 1 tablet (10 mg) by mouth as needed at bedtime.      cyclobenzaprine (Flexeril) 10 mg tablet Take 1 tablet (10 mg) by mouth as needed at  bedtime for muscle spasms. 30 tablet 2    fish oil concentrate (Omega-3) 120-180 mg capsule Take 1 capsule (1 g) by mouth once daily.      loratadine 10 mg capsule Take by mouth.      magnesium oxide (Mag-Ox) 400 mg (241.3 mg magnesium) tablet Take 1 tablet (400 mg) by mouth 2 times a day.      multivitamin tablet Take 1 tablet by mouth once daily.      naproxen (Naprosyn) 500 mg tablet Take 1 tablet (500 mg) by mouth every 12 hours.      phentermine (Adipex-P) 37.5 mg tablet Take 1 tablet (37.5 mg) by mouth once daily in the morning. Take before meals. 30 tablet 0    zinc gluconate 50 mg tablet Take 1 tablet (50 mg) by mouth once daily.       No current facility-administered medications for this visit.       Review of Systems    REVIEW OF SYSTEMS  GENERAL:  Negative for malaise, significant weight loss, fever  CARDIOVASCULAR: leg swelling   MUSCULOSKELETAL:  Negative for joint pain or swelling, back pain, and muscle pain.  SKIN:  Negative for lesions, rash, and itching  PSYCH:  Negative for sleep disturbance, mood disorder and recent psychosocial stressors  NEURO: Negative, denies any burning, tingling or numbness     Objective:   Vasc: DP and PT pulses are palpable bilateral.  CFT is less than 3 seconds bilateral.  Skin temperature is warm to cool proximal to distal bilateral.      Neuro:  Light touch is intact to the foot bilateral.      Derm: Nails are normal. Skin is supple with normal texture and turgor noted.  Webspaces are clean, dry and intact bilateral.  There are no hyperkeratoses, ulcerations, verruca or other lesions noted.      Ortho: Muscle strength is 5/5 for all pedal groups .  Patient is having diffuse right foot pain.  The primary epicenter of pain is metatarsal base.  Patient is also having exquisite pain second MPJ.  Patient has had no interval healing or improvement in her pain since I last saw her.    Assessment/Plan     Diagnoses and all orders for this visit:  Closed avulsion fracture of  metatarsal bone of right foot with delayed healing, subsequent encounter  Stress fracture of right foot with delayed healing, subsequent encounter    MRI was not reviewed in depth with patient.  Feel patient would benefit from a bone stimulator as she has nonhealing fracture of her right foot.  I believe she has developed additional pain secondary to compensation.           Tiesha Alex, CMA

## 2023-11-30 ENCOUNTER — ANCILLARY PROCEDURE (OUTPATIENT)
Dept: RADIOLOGY | Facility: CLINIC | Age: 53
End: 2023-11-30
Payer: COMMERCIAL

## 2023-11-30 DIAGNOSIS — R05.2 SUBACUTE COUGH: ICD-10-CM

## 2023-11-30 DIAGNOSIS — J32.8 OTHER CHRONIC SINUSITIS: ICD-10-CM

## 2023-11-30 PROCEDURE — 71046 X-RAY EXAM CHEST 2 VIEWS: CPT | Mod: FY

## 2023-11-30 PROCEDURE — 71046 X-RAY EXAM CHEST 2 VIEWS: CPT | Mod: FOREIGN READ | Performed by: RADIOLOGY

## 2023-12-06 ENCOUNTER — HOSPITAL ENCOUNTER (OUTPATIENT)
Dept: RADIOLOGY | Facility: HOSPITAL | Age: 53
Discharge: HOME | End: 2023-12-06
Payer: COMMERCIAL

## 2023-12-06 DIAGNOSIS — Z12.39 ENCOUNTER FOR OTHER SCREENING FOR MALIGNANT NEOPLASM OF BREAST: ICD-10-CM

## 2023-12-06 PROCEDURE — 77049 MRI BREAST C-+ W/CAD BI: CPT

## 2023-12-06 PROCEDURE — 77049 MRI BREAST C-+ W/CAD BI: CPT | Mod: BILATERAL PROCEDURE | Performed by: STUDENT IN AN ORGANIZED HEALTH CARE EDUCATION/TRAINING PROGRAM

## 2023-12-06 PROCEDURE — 2550000001 HC RX 255 CONTRASTS: Performed by: NURSE PRACTITIONER

## 2023-12-06 PROCEDURE — A9575 INJ GADOTERATE MEGLUMI 0.1ML: HCPCS | Performed by: NURSE PRACTITIONER

## 2023-12-06 RX ORDER — GADOTERATE MEGLUMINE 376.9 MG/ML
0.2 INJECTION INTRAVENOUS
Status: COMPLETED | OUTPATIENT
Start: 2023-12-06 | End: 2023-12-06

## 2023-12-06 RX ADMIN — GADOTERATE MEGLUMINE 16 ML: 376.9 INJECTION INTRAVENOUS at 10:37

## 2024-01-03 ENCOUNTER — ANCILLARY PROCEDURE (OUTPATIENT)
Dept: RADIOLOGY | Facility: CLINIC | Age: 54
End: 2024-01-03
Payer: COMMERCIAL

## 2024-01-03 ENCOUNTER — OFFICE VISIT (OUTPATIENT)
Dept: ORTHOPEDIC SURGERY | Facility: CLINIC | Age: 54
End: 2024-01-03
Payer: COMMERCIAL

## 2024-01-03 DIAGNOSIS — S82.66XA NONDISPLACED FRACTURE OF LATERAL MALLEOLUS OF UNSPECIFIED FIBULA, INITIAL ENCOUNTER FOR CLOSED FRACTURE: Primary | ICD-10-CM

## 2024-01-03 DIAGNOSIS — S93.401A SPRAIN OF UNSPECIFIED LIGAMENT OF RIGHT ANKLE, INITIAL ENCOUNTER: ICD-10-CM

## 2024-01-03 PROCEDURE — 99214 OFFICE O/P EST MOD 30 MIN: CPT | Performed by: INTERNAL MEDICINE

## 2024-01-03 PROCEDURE — 27786 TREATMENT OF ANKLE FRACTURE: CPT | Performed by: INTERNAL MEDICINE

## 2024-01-03 PROCEDURE — 73610 X-RAY EXAM OF ANKLE: CPT | Mod: RIGHT SIDE | Performed by: RADIOLOGY

## 2024-01-03 PROCEDURE — L4361 PNEUMA/VAC WALK BOOT PRE OTS: HCPCS | Performed by: INTERNAL MEDICINE

## 2024-01-03 PROCEDURE — 73610 X-RAY EXAM OF ANKLE: CPT | Mod: RT

## 2024-01-03 PROCEDURE — 99214 OFFICE O/P EST MOD 30 MIN: CPT | Mod: 57 | Performed by: INTERNAL MEDICINE

## 2024-01-03 NOTE — LETTER
January 3, 2024     Patient: Zainab Lagunas   YOB: 1970   Date of Visit: 1/3/2024       To Whom It May Concern:    Zainab Lagunas was seen in my clinic on 1/3/2024 at 3:30 pm. Please excuse Zainab for her absence from work on this day to make the appointment.  May return to work 1/4/23 with the following restrictions, light duty work only with sit down breaks as pain tolerates until follow up visit.      If you have any questions or concerns, please don't hesitate to call.         Sincerely,         Tressa Guzman MD        CC: No Recipients

## 2024-01-03 NOTE — PROGRESS NOTES
Acute Injury New Patient Visit    CC:   Chief Complaint   Patient presents with    Right Ankle - Pain     Rt ankle injury  [facility]  Tripped at work  Xrays at        HPI: Zainab is a 53 y.o. female who presents today for acute right ankle injury sustained when she tripped at work today. She comes in for her initial evaluation. She states that she drove herself to the ER and had X-rays taken.  She was told she had a fracture.        Review of Systems   GENERAL: Negative for malaise, significant weight loss, fever  MUSCULOSKELETAL: See HPI  NEURO:  Negative for numbness / tingling     Past Medical History  Past Medical History:   Diagnosis Date    Anxiety disorder, unspecified 12/29/2022    Anxiety    Contact with and (suspected) exposure to covid-19 11/29/2022    Suspected COVID-19 virus infection    Fatty (change of) liver, not elsewhere classified 07/23/2021    NAFLD (nonalcoholic fatty liver disease)    Hypoesthesia of skin     Hypesthesia    Personal history of other diseases of the musculoskeletal system and connective tissue     History of arthritis    Personal history of other diseases of the respiratory system 11/17/2020    History of acute bronchitis    Pneumonia, unspecified organism 01/07/2020    Pneumonia       Medication review  Medication Documentation Review Audit       Reviewed by Yanni Mancilla MA (Technologist) on 01/03/24 at 1559      Medication Order Taking? Sig Documenting Provider Last Dose Status   albuterol 90 mcg/actuation inhaler [id number]  Inhale 1 puff 3 times a day. Radha Quiroz PA-C  Active   ALPRAZolam (Xanax) 0.25 mg tablet [id number] No Take 1 tablet (0.25 mg) by mouth as needed at bedtime for anxiety. Historical Provider, MD Taking Active   ascorbic acid (Vitamin C) 500 mg tablet [id number] No Take 1 tablet (500 mg) by mouth once daily. Historical Provider, MD Taking Active   budesonide-formoteroL (Symbicort) 160-4.5 mcg/actuation inhaler [id number]  Inhale 2 puffs every 12 hours.  Radha Quiroz PA-C  Active   cetirizine (ZyrTEC) 10 mg tablet [id number] No Take 1 tablet (10 mg) by mouth once daily. Historical Provider, MD Taking Active   cholecalciferol (Vitamin D-3) 5,000 Units tablet [id number] No Take by mouth once daily. Historical Provider, MD Taking Active   cyanocobalamin (Vitamin B-12) 1,000 mcg tablet [id number] No Take 1 tablet (1,000 mcg) by mouth once daily. Historical Provider, MD Taking Active   cyclobenzaprine (Flexeril) 10 mg tablet [id number] No Take 1 tablet (10 mg) by mouth as needed at bedtime. Historical Provider, MD Taking Active   cyclobenzaprine (Flexeril) 10 mg tablet [id number]  Take 1 tablet (10 mg) by mouth as needed at bedtime for muscle spasms. Radha Quiroz PA-C  Active   fish oil concentrate (Omega-3) 120-180 mg capsule [id number] No Take 1 capsule (1 g) by mouth once daily. Historical Provider, MD Taking Active   loratadine 10 mg capsule [id number] No Take by mouth. Historical Provider, MD Not Taking Active   magnesium oxide (Mag-Ox) 400 mg (241.3 mg magnesium) tablet [id number] No Take 1 tablet (400 mg) by mouth 2 times a day. Historical Provider, MD Taking Active   multivitamin tablet [id number] No Take 1 tablet by mouth once daily. Historical Provider, MD Taking Active   naproxen (Naprosyn) 500 mg tablet [id number] No Take 1 tablet (500 mg) by mouth every 12 hours. Historical Provider, MD Taking Active   phentermine (Adipex-P) 37.5 mg tablet [id number]  Take 1 tablet (37.5 mg) by mouth once daily in the morning. Take before meals. Radha Quiroz PA-C   [id number]   zinc gluconate 50 mg tablet [id number] No Take 1 tablet (50 mg) by mouth once daily. Historical Provider, MD Taking Active                    Allergies  Allergies   Allergen Reactions    Other Unknown       Social History  Social History     Socioeconomic History    Marital status:      Spouse name: Not on file    Number of children: Not on file    Years of education: Not on file     Highest education level: Not on file   Occupational History    Not on file   Tobacco Use    Smoking status: Every Day     Types: Cigarettes    Smokeless tobacco: Never   Substance and Sexual Activity    Alcohol use: Yes     Comment: occ    Drug use: Never    Sexual activity: Not on file   Other Topics Concern    Not on file   Social History Narrative    Not on file     Social Determinants of Health     Financial Resource Strain: Not on file   Food Insecurity: Not on file   Transportation Needs: Not on file   Physical Activity: Not on file   Stress: Not on file   Social Connections: Not on file   Intimate Partner Violence: Not on file   Housing Stability: Not on file       Surgical History  Past Surgical History:   Procedure Laterality Date    HYSTEROSCOPY  02/15/2016    Hysteroscopy With Endometrial Ablation    OTHER SURGICAL HISTORY  02/18/2020    Endometrial ablation    TUBAL LIGATION  02/15/2016    Tubal Ligation       Physical Exam:  GENERAL:  Patient is awake, alert, and oriented to person place and time.  Patient appears well nourished and well kept.  Affect Calm, Not Acutely Distressed.  HEENT:  Normocephalic, Atraumatic, EOMI  CARDIOVASCULAR:  Hemodynamically stable.  RESPIRATORY:  Normal respirations with unlabored breathing.  Extremity: Right foot and ankle shows skin is intact.  Swelling localized on the lateral aspect.  There is no pain over the medial malleolus.  There is no pain of the deltoid ligament.  Pain over the lateral malleolus.  There is no pain of the ATF, CF or PTF ligament.  Negative Nunez's test.  There is no pain of the base of the fifth metatarsal bone.  She is neurovascular intact.      Diagnostics: X-rays reviewed  XR ankle right 3+ views  Narrative: Interpreted By:  Latonia Parkinson,   STUDY:  XR ANKLE RIGHT 3+ VIEWS; ;  1/3/2024 10:30 am      INDICATION:  Signs/Symptoms:Sprian Right ankle.      COMPARISON:  None.      ACCESSION NUMBER(S):  [id number]      ORDERING  CLINICIAN:  MUNIRA CINTRON      FINDINGS:  Nondisplaced fracture of the distal fibula below the level of the  syndesmosis, avulsion type injury. Soft tissue swelling of the  lateral ankle. Large plantar calcaneal enthesophyte. Chronic  appearing fracture of the 5th metatarsal base.      Impression: Nondisplaced fracture of the distal fibula. Adjacent soft tissue  swelling.      Plantar calcaneal enthesophyte.      Chronic appearing 5th metatarsal base fracture      MACRO:  None      Signed by: Latonia Parkinson 1/3/2024 12:00 PM  Dictation workstation:   [id number]      Procedure: None    Assessment:   Acute nondisplaced lateral malleolus fracture of the right ankle    Plan: Zainab presents today for acute right ankle injury sustained after a fall at work. On examination and review of her X-rays, she has a acute nondisplaced right lateral malleolar fracture. We recommend a short fracture boot.  She may weight-bear as tolerated.  She may to take the boot off to shower and skin care.  She will follow up in 3 weeks at which time we will reassess her injury and repeat x-rays of the right ankle 3 views AP, lateral and oblique views.     No orders of the defined types were placed in this encounter.     At the conclusion of the visit there were no further questions by the patient/family regarding their plan of care.  Patient was instructed to call or return with any issues, questions, or concerns regarding their injury and/or treatment plan described above.     01/03/24 at 4:00 PM - Tressa Guzman MD  Scribe Attestation  By signing my name below, I David Tom Edouard   attest that this documentation has been prepared under the direction and in the presence of Tressa Guzamn MD.   Office: [phone]    This note was prepared using voice recognition software.  The details of this note are correct and have been reviewed, and corrected to the best of my ability.  Some grammatical errors may persist related to  the Dragon software.

## 2024-01-12 ENCOUNTER — TELEPHONE (OUTPATIENT)
Dept: ORTHOPEDIC SURGERY | Facility: CLINIC | Age: 54
End: 2024-01-12
Payer: COMMERCIAL

## 2024-01-12 NOTE — TELEPHONE ENCOUNTER
1/12/24 - walking boot dispensed 1/4/24   Ankle lace up for follow up on 1/23/24 1/12/24 - C-9's submitted for approval on both items same day to [facility] ([facility] comp)

## 2024-01-15 ENCOUNTER — TELEPHONE (OUTPATIENT)
Dept: ORTHOPEDIC SURGERY | Facility: CLINIC | Age: 54
End: 2024-01-15
Payer: COMMERCIAL

## 2024-01-15 NOTE — TELEPHONE ENCOUNTER
1/15/24 - walking boot approved by [facility]  Ankle lace up approved by [facility]

## 2024-01-17 ENCOUNTER — OFFICE VISIT (OUTPATIENT)
Dept: PRIMARY CARE | Facility: CLINIC | Age: 54
End: 2024-01-17
Payer: COMMERCIAL

## 2024-01-17 VITALS
OXYGEN SATURATION: 97 % | WEIGHT: 187.4 LBS | HEART RATE: 88 BPM | DIASTOLIC BLOOD PRESSURE: 91 MMHG | BODY MASS INDEX: 33.2 KG/M2 | TEMPERATURE: 98.3 F | SYSTOLIC BLOOD PRESSURE: 128 MMHG | HEIGHT: 63 IN

## 2024-01-17 DIAGNOSIS — R31.9 URINARY TRACT INFECTION WITH HEMATURIA, SITE UNSPECIFIED: ICD-10-CM

## 2024-01-17 DIAGNOSIS — N39.0 URINARY TRACT INFECTION WITH HEMATURIA, SITE UNSPECIFIED: ICD-10-CM

## 2024-01-17 DIAGNOSIS — R39.9 UTI SYMPTOMS: Primary | ICD-10-CM

## 2024-01-17 LAB
POC APPEARANCE, URINE: ABNORMAL
POC BILIRUBIN, URINE: NEGATIVE
POC BLOOD, URINE: ABNORMAL
POC COLOR, URINE: YELLOW
POC GLUCOSE, URINE: NEGATIVE MG/DL
POC KETONES, URINE: NEGATIVE MG/DL
POC LEUKOCYTES, URINE: NEGATIVE
POC NITRITE,URINE: NEGATIVE
POC PH, URINE: 5.5 PH
POC PROTEIN, URINE: NEGATIVE MG/DL
POC SPECIFIC GRAVITY, URINE: >=1.03
POC UROBILINOGEN, URINE: 0.2 EU/DL

## 2024-01-17 PROCEDURE — 81003 URINALYSIS AUTO W/O SCOPE: CPT | Performed by: PHYSICIAN ASSISTANT

## 2024-01-17 PROCEDURE — 87086 URINE CULTURE/COLONY COUNT: CPT

## 2024-01-17 PROCEDURE — 99213 OFFICE O/P EST LOW 20 MIN: CPT | Performed by: PHYSICIAN ASSISTANT

## 2024-01-17 PROCEDURE — 3008F BODY MASS INDEX DOCD: CPT | Performed by: PHYSICIAN ASSISTANT

## 2024-01-17 RX ORDER — NITROFURANTOIN 25; 75 MG/1; MG/1
100 CAPSULE ORAL 2 TIMES DAILY
Qty: 14 CAPSULE | Refills: 0 | Status: SHIPPED | OUTPATIENT
Start: 2024-01-17 | End: 2024-01-24

## 2024-01-17 ASSESSMENT — PATIENT HEALTH QUESTIONNAIRE - PHQ9
1. LITTLE INTEREST OR PLEASURE IN DOING THINGS: NOT AT ALL
SUM OF ALL RESPONSES TO PHQ9 QUESTIONS 1 AND 2: 0
2. FEELING DOWN, DEPRESSED OR HOPELESS: NOT AT ALL

## 2024-01-17 NOTE — PROGRESS NOTES
"Subjective   Patient ID: Zainab Lagunas is a 53 y.o. female who presents for UTI.    HPI     Complains of having UTI sx of frequency, urgency, pelvic pressure, and slight odor and burning started last week on/off.   Trace blood in urinalysis  She has never had a UTI before and is worried this is going to progress.   Has not tried any over-the-counter medications.  She has tried to increase her fluid intake.  She denies fever, nausea, worsened symptoms.  No new partner, no vaginal discharge.      Review of Systems  Constitutional: Patient denies any fever, chills, loss of appetite, or unexplained weight loss.  Cardiovascular: Denies any chest pain, shortness of breath with exertion, tachycardia, palpitations.  Respiratory: Patient denies any cough, shortness of breath, or wheezing.  Skin:  Denies any rashes or skin lesions.    Objective   BP (!) 128/91   Pulse 88   Temp 36.8 °C (98.3 °F)   Ht 1.6 m (5' 3\")   Wt 85 kg (187 lb 6.4 oz)   SpO2 97%   BMI 33.20 kg/m²     Physical Exam  Gen. appearance: Alert and cooperative, no acute distress, well-developed/well-nourished woman.  Heart: Regular rate and rhythm without murmur or ectopy.  Lungs: Clear to auscultation bilaterally with good air exchange.  Abdomen: Soft and nondistended.  There is mild suprapubic tenderness with palpation.  No masses, guarding, rebound, or rigidity.  Bowel sounds are normal.  No abdominal bruits.  No CVA tenderness.    Assessment/Plan   Diagnoses and all orders for this visit:  UTI symptoms  -     POCT UA Automated manually resulted  Urinary tract infection with hematuria, site unspecified  -     Urine Culture  -     nitrofurantoin, macrocrystal-monohydrate, (Macrobid) 100 mg capsule; Take 1 capsule (100 mg) by mouth 2 times a day for 7 days.  Patient's urine is being sent away for culture.  In the meantime she is to start on Macrobid 100 mg twice daily for 7 days.  She will return to the clinic in 5 to 7 days if symptoms or not " improved or go to the ER if symptoms worsen.    Patient understands that should they have testing outside   facilities that we may not receive the results and was told to call us if they have not heard from our office within a week after testing.    [facility] uses voice recognition technology for dictations. Sometimes the software misinterprets words. Please take this into account when reading this.

## 2024-01-18 LAB — BACTERIA UR CULT: NORMAL

## 2024-01-23 ENCOUNTER — HOSPITAL ENCOUNTER (OUTPATIENT)
Dept: RADIOLOGY | Facility: CLINIC | Age: 54
Discharge: HOME | End: 2024-01-23
Payer: COMMERCIAL

## 2024-01-23 ENCOUNTER — OFFICE VISIT (OUTPATIENT)
Dept: ORTHOPEDIC SURGERY | Facility: CLINIC | Age: 54
End: 2024-01-23
Payer: COMMERCIAL

## 2024-01-23 DIAGNOSIS — S82.66XA NONDISPLACED FRACTURE OF LATERAL MALLEOLUS OF UNSPECIFIED FIBULA, INITIAL ENCOUNTER FOR CLOSED FRACTURE: ICD-10-CM

## 2024-01-23 DIAGNOSIS — S82.66XA NONDISPLACED FRACTURE OF LATERAL MALLEOLUS OF UNSPECIFIED FIBULA, INITIAL ENCOUNTER FOR CLOSED FRACTURE: Primary | ICD-10-CM

## 2024-01-23 PROCEDURE — 73610 X-RAY EXAM OF ANKLE: CPT | Mod: RT

## 2024-01-23 PROCEDURE — L1902 AFO ANKLE GAUNTLET PRE OTS: HCPCS | Performed by: INTERNAL MEDICINE

## 2024-01-23 PROCEDURE — 99024 POSTOP FOLLOW-UP VISIT: CPT | Performed by: INTERNAL MEDICINE

## 2024-01-23 PROCEDURE — 73610 X-RAY EXAM OF ANKLE: CPT | Mod: RIGHT SIDE | Performed by: INTERNAL MEDICINE

## 2024-01-23 NOTE — PROGRESS NOTES
CC:   Chief Complaint   Patient presents with    Right Ankle - Follow-up     Nondisplaced fracture of lateral malleolus of unspecified fibula  DOI: 1/3/24  Repeat xrays today       HPI: Zainab is a 53 y.o. female who presents today for follow-up for reevaluation for a right nondisplaced fracture of lateral malleolus of the right ankle. She sates that she is doing well. She denies any pain. She she is here for repeat xrays today.         Review of Systems   GENERAL: Negative for malaise, significant weight loss, fever  MUSCULOSKELETAL: See HPI  NEURO:  Negative for numbness / tingling     Past Medical History  Past Medical History:   Diagnosis Date    Anxiety disorder, unspecified 12/29/2022    Anxiety    Contact with and (suspected) exposure to covid-19 11/29/2022    Suspected COVID-19 virus infection    Fatty (change of) liver, not elsewhere classified 07/23/2021    NAFLD (nonalcoholic fatty liver disease)    Hypoesthesia of skin     Hypesthesia    Personal history of other diseases of the musculoskeletal system and connective tissue     History of arthritis    Personal history of other diseases of the respiratory system 11/17/2020    History of acute bronchitis    Pneumonia, unspecified organism 01/07/2020    Pneumonia       Medication review  Medication Documentation Review Audit       Reviewed by Radha Quiroz PA-C (Physician Assistant) on 01/17/24 at 1415      Medication Order Taking? Sig Documenting Provider Last Dose Status   albuterol 90 mcg/actuation inhaler [id number] Yes Inhale 1 puff 3 times a day. Radha Quiroz PA-C Taking Active   ALPRAZolam (Xanax) 0.25 mg tablet [id number] Yes Take 1 tablet (0.25 mg) by mouth as needed at bedtime for anxiety. Historical Provider, MD Taking Active   ascorbic acid (Vitamin C) 500 mg tablet [id number] Yes Take 1 tablet (500 mg) by mouth once daily. Historical Provider, MD Taking Active   budesonide-formoteroL (Symbicort) 160-4.5 mcg/actuation inhaler  [id number] Yes Inhale 2 puffs every 12 hours. Radha Quiroz PA-C Taking Active   cetirizine (ZyrTEC) 10 mg tablet [id number] Yes Take 1 tablet (10 mg) by mouth once daily. Historical Provider, MD Taking Active   cholecalciferol (Vitamin D-3) 5,000 Units tablet [id number] Yes Take by mouth once daily. Historical Provider, MD Taking Active   cyanocobalamin (Vitamin B-12) 1,000 mcg tablet [id number] Yes Take 1 tablet (1,000 mcg) by mouth once daily. Historical Provider, MD Taking Active   Discontinued 01/17/24 1413   cyclobenzaprine (Flexeril) 10 mg tablet [id number] Yes Take 1 tablet (10 mg) by mouth as needed at bedtime for muscle spasms. Radha Quiroz PA-C Taking Active   fish oil concentrate (Omega-3) 120-180 mg capsule [id number] Yes Take 1 capsule (1 g) by mouth once daily. Historical Provider, MD Taking Active   loratadine 10 mg capsule [id number] Yes Take by mouth. Historical Provider, MD Taking Active   magnesium oxide (Mag-Ox) 400 mg (241.3 mg magnesium) tablet [id number] Yes Take 1 tablet (400 mg) by mouth 2 times a day. Historical Provider, MD Taking Active   multivitamin tablet [id number] Yes Take 1 tablet by mouth once daily. Historical Provider, MD Taking Active   naproxen (Naprosyn) 500 mg tablet [id number] Yes Take 1 tablet (500 mg) by mouth every 12 hours. Historical MD Andrews Taking Active   nitrofurantoin, macrocrystal-monohydrate, (Macrobid) 100 mg capsule [id number]  Take 1 capsule (100 mg) by mouth 2 times a day for 7 days. Radha Quiroz PA-C  Active     Discontinued 01/17/24 1413   zinc gluconate 50 mg tablet [id number] Yes Take 1 tablet (50 mg) by mouth once daily. Historical Provider, MD Taking Active                    Allergies  Allergies   Allergen Reactions    Other Unknown       Social History  Social History     Socioeconomic History    Marital status:      Spouse name: Not on file    Number of children: Not on file    Years of education: Not on file    Highest education level: Not  on file   Occupational History    Not on file   Tobacco Use    Smoking status: Some Days     Packs/day: 1.00     Years: 25.00     Additional pack years: 0.00     Total pack years: 25.00     Types: Cigarettes    Smokeless tobacco: Never   Substance and Sexual Activity    Alcohol use: Yes     Comment: occ    Drug use: Never    Sexual activity: Not on file   Other Topics Concern    Not on file   Social History Narrative    Not on file     Social Determinants of Health     Financial Resource Strain: Not on file   Food Insecurity: Not on file   Transportation Needs: Not on file   Physical Activity: Not on file   Stress: Not on file   Social Connections: Not on file   Intimate Partner Violence: Not on file   Housing Stability: Not on file       Surgical History  Past Surgical History:   Procedure Laterality Date    HYSTEROSCOPY  02/15/2016    Hysteroscopy With Endometrial Ablation    OTHER SURGICAL HISTORY  02/18/2020    Endometrial ablation    TUBAL LIGATION  02/15/2016    Tubal Ligation       Physical Exam:  GENERAL:  Patient is awake, alert, and oriented to person place and time.  Patient appears well nourished and well kept.  Affect Calm, Not Acutely Distressed.  HEENT:  Normocephalic, Atraumatic, EOMI  CARDIOVASCULAR:  Hemodynamically stable.  RESPIRATORY:  Normal respirations with unlabored breathing.  Extremity: Right foot and ankle shows skin is intact.  No obvious swelling or deformity.  There is no pain over the medial or lateral malleolus.  There is no pain over the deltoid ligament.  There is no pain of the ATF, CF or PTF ligament.  Negative Nunez's test.  She is neurovascular intact.      Diagnostics: x-rays reviewed        Procedure: None    Assessment:   Right ankle nondisplaced fracture of lateral malleolus    Plan: Zainab presents today for follow-up for reevaluation for a right nondisplaced fracture of lateral malleolus of the right ankle. She is clinically doing well. X-rays showed satisfactory  healing fracture. She has no pain.  We will place her in a lace-up brace. She will follow up in 3 weeks with repeat x-rays of the right ankle 3 views, AP, lateral, and oblique.  He was fit for C9 for physical therapy, she may begin once therapy is approved.  She is currently work with no restrictions.    Orders Placed This Encounter    XR ankle right 3+ views      At the conclusion of the visit there were no further questions by the patient/family regarding their plan of care.  Patient was instructed to call or return with any issues, questions, or concerns regarding their injury and/or treatment plan described above.     01/23/24 at 3:38 PM - Tressa Guzman MD  Scribe Attestation  By signing my name below, I, David Silke, Scribe   attest that this documentation has been prepared under the direction and in the presence of Tressa Guzman MD.    Office: [phone]    This note was prepared using voice recognition software.  The details of this note are correct and have been reviewed, and corrected to the best of my ability.  Some grammatical errors may persist related to the Dragon software.

## 2024-02-13 ENCOUNTER — OFFICE VISIT (OUTPATIENT)
Dept: ORTHOPEDIC SURGERY | Facility: CLINIC | Age: 54
End: 2024-02-13
Payer: COMMERCIAL

## 2024-02-13 ENCOUNTER — HOSPITAL ENCOUNTER (OUTPATIENT)
Dept: RADIOLOGY | Facility: CLINIC | Age: 54
Discharge: HOME | End: 2024-02-13
Payer: COMMERCIAL

## 2024-02-13 DIAGNOSIS — S82.66XA NONDISPLACED FRACTURE OF LATERAL MALLEOLUS OF UNSPECIFIED FIBULA, INITIAL ENCOUNTER FOR CLOSED FRACTURE: ICD-10-CM

## 2024-02-13 PROCEDURE — 73610 X-RAY EXAM OF ANKLE: CPT | Mod: RT

## 2024-02-13 PROCEDURE — 99024 POSTOP FOLLOW-UP VISIT: CPT | Performed by: INTERNAL MEDICINE

## 2024-02-13 PROCEDURE — 73610 X-RAY EXAM OF ANKLE: CPT | Mod: RIGHT SIDE | Performed by: INTERNAL MEDICINE

## 2024-02-13 NOTE — PROGRESS NOTES
CC:   Chief Complaint   Patient presents with    Right Ankle - Follow-up, Worker's Compensation     Right ankle nondisplaced fracture of lateral malleolus  Repeat xrays        HPI: Zainab is a 53 y.o. female presents today for reevaluation for right ankle nondisplaced fracture of lateral malleolus. She states that she is doing well. She denies any pain today. Repeat x-rays today.  She is wearing her ankle brace today and will begin physical therapy next week.  No new issues.        Review of Systems   GENERAL: Negative for malaise, significant weight loss, fever  MUSCULOSKELETAL: See HPI  NEURO:  Negative for numbness / tingling     Past Medical History  Past Medical History:   Diagnosis Date    Anxiety disorder, unspecified 12/29/2022    Anxiety    Contact with and (suspected) exposure to covid-19 11/29/2022    Suspected COVID-19 virus infection    Fatty (change of) liver, not elsewhere classified 07/23/2021    NAFLD (nonalcoholic fatty liver disease)    Hypoesthesia of skin     Hypesthesia    Personal history of other diseases of the musculoskeletal system and connective tissue     History of arthritis    Personal history of other diseases of the respiratory system 11/17/2020    History of acute bronchitis    Pneumonia, unspecified organism 01/07/2020    Pneumonia       Medication review  Medication Documentation Review Audit       Reviewed by Radha Quiroz PA-C (Physician Assistant) on 01/17/24 at 1415      Medication Order Taking? Sig Documenting Provider Last Dose Status   albuterol 90 mcg/actuation inhaler [id number] Yes Inhale 1 puff 3 times a day. Radha Quiroz PA-C Taking Active   ALPRAZolam (Xanax) 0.25 mg tablet [id number] Yes Take 1 tablet (0.25 mg) by mouth as needed at bedtime for anxiety. Historical Provider, MD Taking Active   ascorbic acid (Vitamin C) 500 mg tablet [id number] Yes Take 1 tablet (500 mg) by mouth once daily. Historical Provider, MD Taking Active   budesonide-formoteroL  (Symbicort) 160-4.5 mcg/actuation inhaler [id number] Yes Inhale 2 puffs every 12 hours. Radha Quiroz PA-C Taking Active   cetirizine (ZyrTEC) 10 mg tablet [id number] Yes Take 1 tablet (10 mg) by mouth once daily. Historical Provider, MD Taking Active   cholecalciferol (Vitamin D-3) 5,000 Units tablet [id number] Yes Take by mouth once daily. Historical Provider, MD Taking Active   cyanocobalamin (Vitamin B-12) 1,000 mcg tablet [id number] Yes Take 1 tablet (1,000 mcg) by mouth once daily. Historical MD Andrews Taking Active   Discontinued 01/17/24 1413   cyclobenzaprine (Flexeril) 10 mg tablet [id number] Yes Take 1 tablet (10 mg) by mouth as needed at bedtime for muscle spasms. Radha Quiroz PA-C Taking Active   fish oil concentrate (Omega-3) 120-180 mg capsule [id number] Yes Take 1 capsule (1 g) by mouth once daily. Historical Provider, MD Taking Active   loratadine 10 mg capsule [id number] Yes Take by mouth. Historical Provider, MD Taking Active   magnesium oxide (Mag-Ox) 400 mg (241.3 mg magnesium) tablet [id number] Yes Take 1 tablet (400 mg) by mouth 2 times a day. Historical Provider, MD Taking Active   multivitamin tablet [id number] Yes Take 1 tablet by mouth once daily. Historical Provider, MD Taking Active   naproxen (Naprosyn) 500 mg tablet [id number] Yes Take 1 tablet (500 mg) by mouth every 12 hours. Historical Provider, MD Taking Active   nitrofurantoin, macrocrystal-monohydrate, (Macrobid) 100 mg capsule [id number]  Take 1 capsule (100 mg) by mouth 2 times a day for 7 days. Radha Quiroz PA-C  Active     Discontinued 01/17/24 1413   zinc gluconate 50 mg tablet [id number] Yes Take 1 tablet (50 mg) by mouth once daily. Historical Provider, MD Taking Active                    Allergies  Allergies   Allergen Reactions    Other Unknown       Social History  Social History     Socioeconomic History    Marital status:      Spouse name: Not on file    Number of children: Not on file    Years of education: Not  on file    Highest education level: Not on file   Occupational History    Not on file   Tobacco Use    Smoking status: Some Days     Packs/day: 1.00     Years: 25.00     Additional pack years: 0.00     Total pack years: 25.00     Types: Cigarettes    Smokeless tobacco: Never   Substance and Sexual Activity    Alcohol use: Yes     Comment: occ    Drug use: Never    Sexual activity: Not on file   Other Topics Concern    Not on file   Social History Narrative    Not on file     Social Determinants of Health     Financial Resource Strain: Not on file   Food Insecurity: Not on file   Transportation Needs: Not on file   Physical Activity: Not on file   Stress: Not on file   Social Connections: Not on file   Intimate Partner Violence: Not on file   Housing Stability: Not on file       Surgical History  Past Surgical History:   Procedure Laterality Date    HYSTEROSCOPY  02/15/2016    Hysteroscopy With Endometrial Ablation    OTHER SURGICAL HISTORY  02/18/2020    Endometrial ablation    TUBAL LIGATION  02/15/2016    Tubal Ligation       Physical Exam:  GENERAL:  Patient is awake, alert, and oriented to person place and time.  Patient appears well nourished and well kept.  Affect Calm, Not Acutely Distressed.  HEENT:  Normocephalic, Atraumatic, EOMI  CARDIOVASCULAR:  Hemodynamically stable.  RESPIRATORY:  Normal respirations with unlabored breathing.  Extremity: Right foot and ankle shows skin is intact. No obvious swelling or deformity. There is no pain over the medial or lateral malleolus. There is no pain over the deltoid ligament. There is no pain of the ATF, CF or PTF ligament. Negative Nunez's test. She is neurovascular intact.       Diagnostics: X-rays reviewed        Procedure: None    Assessment: Right ankle nondisplaced fracture of lateral malleolus     Plan: Zainab presents today for follow-up for reevaluation for a right nondisplaced fracture of lateral malleolus of the right ankle. She is clinically doing  well. X-rays showed satisfactory healing fracture. She has no pain. She will continue with the lace-up brace as needed.. She will continue with physical therapy. She will follow up as needed.  She is currently working with no restrictions.    Orders Placed This Encounter    XR ankle right 3+ views      At the conclusion of the visit there were no further questions by the patient/family regarding their plan of care.  Patient was instructed to call or return with any issues, questions, or concerns regarding their injury and/or treatment plan described above.     02/13/24 at 11:02 AM - Tressa Guzman MD  Scribe Attestation  By signing my name below, I, David Phanfreda, Scribe   attest that this documentation has been prepared under the direction and in the presence of Tressa Guzman MD.    Office: [phone]    This note was prepared using voice recognition software.  The details of this note are correct and have been reviewed, and corrected to the best of my ability.  Some grammatical errors may persist related to the Dragon software.

## 2024-02-20 ENCOUNTER — HOSPITAL ENCOUNTER (OUTPATIENT)
Dept: RADIOLOGY | Facility: HOSPITAL | Age: 54
Discharge: HOME | End: 2024-02-20
Payer: COMMERCIAL

## 2024-02-20 DIAGNOSIS — Z12.39 ENCOUNTER FOR OTHER SCREENING FOR MALIGNANT NEOPLASM OF BREAST: ICD-10-CM

## 2024-02-20 PROCEDURE — 77067 SCR MAMMO BI INCL CAD: CPT

## 2024-02-20 PROCEDURE — 77067 SCR MAMMO BI INCL CAD: CPT | Mod: BILATERAL PROCEDURE | Performed by: RADIOLOGY

## 2024-02-20 PROCEDURE — 77063 BREAST TOMOSYNTHESIS BI: CPT | Mod: BILATERAL PROCEDURE | Performed by: RADIOLOGY

## 2024-02-21 ENCOUNTER — EVALUATION (OUTPATIENT)
Dept: PHYSICAL THERAPY | Facility: CLINIC | Age: 54
End: 2024-02-21
Payer: COMMERCIAL

## 2024-02-21 DIAGNOSIS — S82.66XA NONDISPLACED FRACTURE OF LATERAL MALLEOLUS OF UNSPECIFIED FIBULA, INITIAL ENCOUNTER FOR CLOSED FRACTURE: ICD-10-CM

## 2024-02-21 PROBLEM — S82.64XA: Status: ACTIVE | Noted: 2024-02-21

## 2024-02-21 PROCEDURE — 97161 PT EVAL LOW COMPLEX 20 MIN: CPT | Mod: GP

## 2024-02-21 PROCEDURE — 97110 THERAPEUTIC EXERCISES: CPT | Mod: GP

## 2024-02-21 ASSESSMENT — ENCOUNTER SYMPTOMS
DEPRESSION: 0
LOSS OF SENSATION IN FEET: 0
OCCASIONAL FEELINGS OF UNSTEADINESS: 0

## 2024-02-21 NOTE — PROGRESS NOTES
Physical Therapy    Physical Therapy Evaluation and Treatment      Patient Name: Zainab Lagunas  MRN: [id number]  Today's Date: 2/21/2024  Time Calculation  Start Time: 1020  Stop Time: 1045  Time Calculation (min): 25 min    Visit #1    Assessment:  Patient presents with R ankle pain along with ankle and foot weakness that are consistent with medical diagnosis of healing R lateral malleolar fracture. Patient would benefit from PT services to address current impairments and facilitate improvement in current activity limits. Educated patient on current POC, initial HEP and current examination findings. Patient verbalized understanding of all education and instruction provided today.        Plan:  OP PT Plan  Treatment/Interventions: Dry needling, Education/ Instruction, Electrical stimulation, Gait training, Manual therapy, Neuromuscular re-education, Self care/ home management, Taping techniques, Therapeutic activities, Therapeutic exercises, Vasopneumatic device, Ultrasound  PT Plan: Skilled PT  PT Frequency: 1 time per week  Duration: 12  Onset Date: 01/03/24  Certification Period Start Date: 01/25/24  Certification Period End Date: 03/15/24    Current Problem:   1. Nondisplaced fracture of lateral malleolus of unspecified fibula, initial encounter for closed fracture  Referral to Physical Therapy    Follow Up In Physical Therapy          Subjective    General:   Patient presents for PT evaluation for R ankle injury sustained on 1/3/24 when she reports that she had a LOB at work and felt immediate R ankle pain. Pt underwent imaging which revealed a non-displaced fracture of her lateral malleolus. Patient was placed in a boot and progressed to a lace up brace. Patient denies any falls or numbness/tinging since the injury and has been referred to PT services. Patient ambulating without assistive device in regular shoes.       Pain:   1/10 lateral R ankle          Objective   Foot/Ankle Musculoskeletal  Exam    Palpation    Right      Tenderness: present          Distal fibula: mild          Peroneal tendon: mild      Strength    Right      Tibialis anterior: 4-/5.       Extensor hallucis longus: 3/5.       Flexor hallucis longus: 3/5.       Gastroc/soleus: 4-/5.       Tibialis posterior: 3/5.       Peroneals: 3/5.     Left      Tibialis anterior: 4/5.       Extensor hallucis longus: 4-/5.       Flexor hallucis longus: 4-/5.       Gastroc/soleus: 4-/5.       Tibialis posterior: 4-/5.       Peroneals: 4-/5.     Special Tests    Right      Anterior drawer test: negative      Talar tilt stress test: negative         Outcome Measures:  Other Measures  Lower Extremity Funtional Score (LEFS): 67/80     Treatments:  Ankle inversion vs. YTB loop x20  Ankle eversion vs. YTB loop x20  Ankle PF vs. YTB loop x20  Toe Ext<>Curl vs. GTB x20    EDUCATION:  Outpatient Education  Individual(s) Educated: Patient  Education Provided: Anatomy, Body Mechanics, Home Exercise Program, Home Safety, Physiology, POC    Goals:  Patient will be independent with HEP.    Patient will improve R ankle inversion & eversion strength to >/=4+/5 for improved ankle and foot stability with ambulation.    Patient will improve R ankle plantarflexion strength to >/=4+/5 for improved propulsion with gait.    Patient will improve R ankle dorsiflexion strength to >/=4+/5 for improved foot clearance and heel strike with ambulation.    Patient will report 0/10 pain with ambulation.    Patient will improve 1st MT extension strength to >/=4+/5 for improved push-off gait mechanics with ambulation.    Patient will improve LEFS score to >/=75/80

## 2024-02-26 ENCOUNTER — APPOINTMENT (OUTPATIENT)
Dept: PHYSICAL THERAPY | Facility: CLINIC | Age: 54
End: 2024-02-26
Payer: COMMERCIAL

## 2024-02-26 NOTE — PROGRESS NOTES
"Zainab Lagunas female   1970 53 y.o.   [id number]      Chief Complaint  High risk surveillance care, annual mammogram and exam    History Of Present Illness  Zainab \"Lelia Lagunas is a very pleasant 53 year old  woman [facility] care. She has family history of breast cancer in her mother, age 50 and sister, age 45. Neither had genetic testing. 3/21/2022 Michelle underwent genetic testing, 36 gene panel, negative. She denies breast surgery or biopsy. She presents today for annual mammogram and exam. She denies any new masses or lumps. She declines endocrine therapy at this time.      BREAST IMAGIN2024 Bilateral screening mammogram, indicates BI-RADS Category 1. 2023 Bilateral Full MRI, indicates BI-RADS Category 1.     FEMALE HISTORY: menarche age 15, , first birth age 22,  x 1 month, OCP's x 1 year, menopause age 50 (FSH & LH 2020 demonstrating menopause status), endometrial ablation in 2009 due to menorrhagia, no HRT, heterogeneously dense tissue     FAMILY CANCER HISTORY:  Mother: Breast cancer, 50  Sister: Breast cancer, 45     Surgical History  She has a past surgical history that includes Hysteroscopy (02/15/2016); Tubal ligation (02/15/2016); and Other surgical history (2020).     Social History  She reports that she has been smoking cigarettes. She has a 25.00 pack-year smoking history. She has never used smokeless tobacco. She reports current alcohol use. She reports that she does not use drugs.    Family History  Family History   Problem Relation Name Age of Onset    Breast cancer Mother          Allergies  Other    Medications  Current Outpatient Medications   Medication Instructions    albuterol 90 mcg/actuation inhaler 1 puff, inhalation, 3 times daily RT    ALPRAZolam (XANAX) 0.25 mg, oral, Nightly PRN    ascorbic acid (Vitamin C) 500 mg tablet 1 tablet, oral, Daily    " budesonide-formoteroL (Symbicort) 160-4.5 mcg/actuation inhaler 2 puffs, inhalation, Every 12 hours    cetirizine (ZYRTEC) 10 mg, oral, Daily    cholecalciferol (Vitamin D-3) 5,000 Units tablet oral, Daily    cyanocobalamin (Vitamin B-12) 1,000 mcg tablet 1 tablet, oral, Daily    cyclobenzaprine (FLEXERIL) 10 mg, oral, Nightly PRN    fish oil concentrate (Omega-3) 120-180 mg capsule 1 g, oral, Daily    loratadine 10 mg capsule oral    magnesium oxide (Mag-Ox) 400 mg (241.3 mg magnesium) tablet 1 tablet, oral, 2 times daily    multivitamin tablet 1 tablet, oral, Daily    naproxen (NAPROSYN) 500 mg, oral, Every 12 hours    zinc gluconate 50 mg tablet 1 tablet, oral, Daily         REVIEW OF SYSTEMS    Constitutional:  Negative for appetite change, fatigue, fever and unexpected weight change.   HENT:  Negative for ear pain, hearing loss, nosebleeds, sore throat and trouble swallowing.    Eyes:  Negative for discharge, itching and visual disturbance.   Respiratory:  Negative for cough, chest tightness and shortness of breath.    Cardiovascular:  Negative for chest pain, palpitations and leg swelling.   Breast: as indicated in HPI  Gastrointestinal:  Negative for abdominal pain, constipation, diarrhea and nausea.   Endocrine: Negative for cold intolerance and heat intolerance.   Genitourinary:  Negative for dysuria, frequency, hematuria, pelvic pain and vaginal bleeding.   Musculoskeletal:  Negative for arthralgias, back pain, gait problem, joint swelling and myalgias.   Skin:  Negative for color change and rash.   Allergic/Immunologic: Negative for environmental allergies and food allergies.   Neurological:  Negative for dizziness, tremors, speech difficulty, weakness, numbness and headaches.   Hematological:  Does not bruise/bleed easily.   Psychiatric/Behavioral:  Negative for agitation, dysphoric mood and sleep disturbance. The patient is not nervous/anxious.         Past Medical History  She has a past medical  history of Anxiety disorder, unspecified (12/29/2022), Contact with and (suspected) exposure to covid-19 (11/29/2022), Fatty (change of) liver, not elsewhere classified (07/23/2021), Hypoesthesia of skin, Personal history of other diseases of the musculoskeletal system and connective tissue, Personal history of other diseases of the respiratory system (11/17/2020), and Pneumonia, unspecified organism (01/07/2020).     Physical Exam  Patient is alert and oriented x3 and in a relaxed and appropriate mood. Her gait is steady and hand grasps are equal. Sclera is clear. The breasts are nearly symmetrical. The tissue is soft without palpable abnormalities, discrete nodules or masses. The skin and nipples appear normal. There is no cervical, supraclavicular or axillary lymphadenopathy. Heart rate and rhythm normal, S1 and S2 appreciated. The lungs are clear to auscultation bilaterally. Abdomen is soft and non-tender.     Physical Exam     Last Recorded Vitals  There were no vitals filed for this visit.    Relevant Results   Time was spent discussing digital images of the radiology testing with the patient. I explained the results in depth, along with suggested explanation for follow up recommendations based on the testing results. BI-RADS Category 1    Imaging      MR breast bilateral w contrast full protocol 12/06/2023    Narrative  Interpreted By:  Manpreet Olson,  Demetri Robertson  STUDY:  BI MR BREAST BILATERAL WITH CONTRAST FULL PROTOCOL;  12/6/2023 10:57  am    ACCESSION NUMBER(S):  [id number]    ORDERING CLINICIAN:  MASON CANALES    INDICATION:  Dense breast tissue. High-risk screening. Family history of breast  cancer with her mother and aunt diagnosed.    COMPARISON:  MRI dated 10/14/2022  Mammogram dated 01/17/2023    TECHNIQUE:  Using a dedicated breast coil, STIR axial and T1-weighted fat  saturation axial images of the breasts were obtained, the latter both  before and after intravenous administration of  Gadolinium DTPA. On an  independent workstation, 3-D images were formulated using Dreamscape BlueD  including time enhancement curves, subtraction images and MIP images.    Intravenous contrast:  16 mL of Dotarem    FINDINGS:  There is  symmetric moderate bilateral background enhancement.    Density:  The breast tissue is heterogeneously dense, which may  obscure small masses.    RIGHT BREAST:  No suspicious mass or nonmass enhancement is  identified.    No axillary or internal mammary lymphadenopathy is appreciated.    LEFT BREAST:  No suspicious mass or nonmass enhancement is identified.    No axillary or internal mammary lymphadenopathy is appreciated.    NON-BREAST FINDINGS:  None.    Impression  No MRI evidence of malignancy in either breast.    BI-RADS CATEGORY:  BI-RADS Category:  1 Negative.  Recommendation:  Routine Screening Breast MRI in 1 Year.  Recommended Date:  1 Year.  Laterality:  Bilateral.    For any future breast imaging appointments, please call [phone]).    I personally reviewed the images/study and I agree with the findings  as stated by resident physician Dr. Marcellus Weston.    MACRO:  None    Signed by: Manpreet Olson 12/6/2023 4:20 PM  Dictation workstation:   [id number]       Assessment/Plan   High risk surveillance care, normal clinical exam and imaging, BRCA negative, family history of breast cancer, heterogeneously dense tissue     Plan: Return in one year for bilateral screening mammogram and office visit. Pending normal MRI results, can push mammogram a couple months. Proceed to Full MRI in December 2024. Discussed Tamoxifen versus Raloxifene for risk reduction, still declines at this time. She will contact office if she decides otherwise.     Patient Discussion/Summary  Your clinical examination and imaging are normal. Please return in one year for bilateral screening mammogram and office visit or sooner if you have any problems or concerns.     High risk breast surveillance care  plan:  Yearly mammogram with digital breast tomosynthesis  Twice yearly clinical breast examinations  Breast MRI - Full MRI in December 2024   Monthly self breast examinations  Vitamin D3 2000 IU/daily (over the counter)  Exercise 3-4 times per week for 45-60 minutes  Limit alcohol to 3-4 drinks per week   Eat a healthy low-fat diet with lots of fruits and vegetables  Risk models indicate personal risk of breast cancer in the next five years and lifetime (age 90)  Breast Cancer Risk Assessment Tool (Carri): 5 year risk 3.9% (average 10.4%) and lifetime risk 28.5% (average 10.8%)  Olman-Katie: 5 year risk 3% (average 1.3%) and lifetime risk 20.1% (average 9%)   Risk model indicates you are eligible for endocrine therapy with Tamoxifen, Raloxifene or Exemestane. Endocrine therapy reduces lifetime risk of breast cancer by 50% when taken for 5 years.    You can see your health information, review clinical summaries from office visits & test results online when you follow your health with MY  Chart, a personal health record. To sign up go to [email or web address]. If you need assistance with signing up or trouble getting into your account call "[email or web address] Patient Line 24/7 at [phone].    My office phone number is [phone] if you need to get in touch with me or have additional questions or concerns. Thank you for choosing [facility] and trusting me as your healthcare provider. I look forward to seeing you again at your next office visit. I am honored to be a provider on your health care team and I remain dedicated to helping you achieve your health goals.      Romina Johnson, APRN-CNP

## 2024-03-04 ENCOUNTER — TREATMENT (OUTPATIENT)
Dept: PHYSICAL THERAPY | Facility: CLINIC | Age: 54
End: 2024-03-04
Payer: COMMERCIAL

## 2024-03-04 DIAGNOSIS — S82.66XA NONDISPLACED FRACTURE OF LATERAL MALLEOLUS OF UNSPECIFIED FIBULA, INITIAL ENCOUNTER FOR CLOSED FRACTURE: ICD-10-CM

## 2024-03-04 PROCEDURE — 97110 THERAPEUTIC EXERCISES: CPT | Mod: GP

## 2024-03-04 PROCEDURE — 97112 NEUROMUSCULAR REEDUCATION: CPT | Mod: GP

## 2024-03-04 NOTE — PROGRESS NOTES
Physical Therapy    Physical Therapy Treatment      Patient Name: Zainab Lagunas  MRN: [id number]  Today's Date: 3/4/2024  Time Calculation  Start Time: 1145  Stop Time: 1214  Time Calculation (min): 29 min    Visit #2    Assessment:  Pt tolerated increased resistance and stability challenges well today. Noted limitations with eccentric control during loading phase with gait. She will benefit from continued therapy addressing lateral ankle stability and eccentric arch control.       Plan:   Cont to address gross ankle strengthening and begin introducing variations of eccentric and isometric stability during dynamic movements and activities.    Current Problem:   1. Nondisplaced fracture of lateral malleolus of unspecified fibula, initial encounter for closed fracture  Follow Up In Physical Therapy          Subjective    General:   Pt reported being able to walk during work with no issues and notes no pain this past week.       Pain:   0/10 lateral R ankle          Objective   Treatments:  Therapeutic Exercise 06657: 15/1   (11:45-12:00)  Ankle inversion vs. RTB loop x10 (added to HEP)  Ankle eversion vs. RTB loop x20 (added to HEP)  Tibial IR vs YTB x10 (added to HEP)  Eccentric heel raises x 12 (added to HEP)  Standing Toe Raises x12  Arch doming x 10    Neuromuscular Re-education 42573: 14/1  (12:00-12:14)  SLS :30 x1 ea leg on AirEx  Tandem stance A-P weight shift x 10 ea direction  Heel to toe tandem stance F/R walking on AirMat x 4 laps  Eccentric heel raises on AirEx x 12          EDUCATION:   Pt provided education on the importance of eccentric control during loading phase of gait. She was instructed to modify exercises and continue with once a day to prevent excessive fatigue with HEP    Goals:  Patient will be independent with HEP.    Patient will improve R ankle inversion & eversion strength to >/=4+/5 for improved ankle and foot stability with ambulation.    Patient will improve R ankle plantarflexion  strength to >/=4+/5 for improved propulsion with gait.    Patient will improve R ankle dorsiflexion strength to >/=4+/5 for improved foot clearance and heel strike with ambulation.    Patient will report 0/10 pain with ambulation.    Patient will improve 1st MT extension strength to >/=4+/5 for improved push-off gait mechanics with ambulation.    Patient will improve LEFS score to >/=75/80

## 2024-03-06 ENCOUNTER — APPOINTMENT (OUTPATIENT)
Dept: SURGICAL ONCOLOGY | Facility: HOSPITAL | Age: 54
End: 2024-03-06
Payer: COMMERCIAL

## 2024-03-11 ENCOUNTER — TREATMENT (OUTPATIENT)
Dept: PHYSICAL THERAPY | Facility: CLINIC | Age: 54
End: 2024-03-11
Payer: COMMERCIAL

## 2024-03-11 DIAGNOSIS — S82.66XA NONDISPLACED FRACTURE OF LATERAL MALLEOLUS OF UNSPECIFIED FIBULA, INITIAL ENCOUNTER FOR CLOSED FRACTURE: ICD-10-CM

## 2024-03-11 PROCEDURE — 97112 NEUROMUSCULAR REEDUCATION: CPT | Mod: GP

## 2024-03-11 PROCEDURE — 97110 THERAPEUTIC EXERCISES: CPT | Mod: GP

## 2024-03-11 NOTE — PROGRESS NOTES
Physical Therapy    Physical Therapy Treatment      Patient Name: Zainab Lagunas  MRN: [id number]  Today's Date: 3/11/2024  Time Calculation  Start Time: 1135  Stop Time: 1213  Time Calculation (min): 38 min    Visit #3    Assessment:  Pt tolerated increased resistance well with no pain reported. Added HEP progression additions of side stepping and marches with GTB for improving foot and ankle stability along with LE strength.        Plan:   Cont to address gross ankle strengthening and begin introducing variations of eccentric and isometric stability during dynamic movements and activities.    Current Problem:   1. Nondisplaced fracture of lateral malleolus of unspecified fibula, initial encounter for closed fracture  Follow Up In Physical Therapy          Subjective    General:   Pt denies any pain today or difficulty walking lately.     Pain:   0/10 lateral R ankle          Objective   Treatments:  Therapeutic Exercise 61254: 25/2   (11:35-12:00)  Ankle inversion vs. RTB loop x10 (added to HEP)  Ankle eversion vs. RTB loop x20 (added to HEP)  Eccentric heel raises x 20 each (soleus, gastroc) (added to HEP)  Standing Toe Raises x12  Arch doming x 10  Marches with ankle DF vs. GTB 2x10   Side stepping with GTB around feet x20 ea    Neuromuscular Re-education 07500: 13/1  (12:00-12:13)  SLS :30 x1 ea leg on AirEx  Tandem stance A-P weight shift x 10 ea direction  Tandem stepping on airex strip 8 laps, 5 steps per lap  Side stepping on airex strip 8 laps, 5 steps per lap  Tilt Board AP 2 min  Tilt Board Med/Lat 2 min        EDUCATION:   Pt provided education on the importance of eccentric control during loading phase of gait. She was instructed to modify exercises and continue with once a day to prevent excessive fatigue with HEP    Goals:  Patient will be independent with HEP.    Patient will improve R ankle inversion & eversion strength to >/=4+/5 for improved ankle and foot stability with ambulation.    Patient  will improve R ankle plantarflexion strength to >/=4+/5 for improved propulsion with gait.    Patient will improve R ankle dorsiflexion strength to >/=4+/5 for improved foot clearance and heel strike with ambulation.    Patient will report 0/10 pain with ambulation.    Patient will improve 1st MT extension strength to >/=4+/5 for improved push-off gait mechanics with ambulation.    Patient will improve LEFS score to >/=75/80

## 2024-03-21 NOTE — PROGRESS NOTES
"Zainab Lagunas female   1970 54 y.o.   [id number]      Chief Complaint  High risk surveillance care, annual exam    History Of Present Illness  Zainab \"Lelia Lagunas is a very pleasant 53 year old  woman [facility]. She has family history of breast cancer in her mother, age 50 and sister, age 45. Neither had genetic testing. 3/21/2022 Michelle underwent genetic testing, 36 gene panel, negative. She denies breast surgery or biopsy. She presents today for annual exam. She denies any new masses or lumps. She declines endocrine therapy at this time.      BREAST IMAGIN2024 Bilateral screening mammogram, indicates BI-RADS Category 1. 2023 Bilateral Full MRI, indicates BI-RADS Category 1.     FEMALE HISTORY: menarche age 15, , first birth age 22,  x 1 month, OCP's x 1 year, menopause age 50 (FSH & LH 2020 demonstrating menopause status), endometrial ablation in 2009 due to menorrhagia, no HRT, heterogeneously dense tissue     FAMILY CANCER HISTORY:  Mother: Breast cancer, 50  Sister: Breast cancer, 45     Surgical History  She has a past surgical history that includes Hysteroscopy (02/15/2016); Tubal ligation (02/15/2016); and Other surgical history (2020).     Social History  She reports that she has been smoking cigarettes. She has a 25.00 pack-year smoking history. She has never used smokeless tobacco. She reports current alcohol use. She reports that she does not use drugs.    Family History  Family History   Problem Relation Name Age of Onset    Breast cancer Mother          Allergies  Other    Medications  Current Outpatient Medications   Medication Instructions    albuterol 90 mcg/actuation inhaler 1 puff, inhalation, 3 times daily RT    ALPRAZolam (XANAX) 0.25 mg, oral, Nightly PRN    ascorbic acid (Vitamin C) 500 mg tablet 1 tablet, oral, Daily    budesonide-formoteroL (Symbicort) 160-4.5 " mcg/actuation inhaler 2 puffs, inhalation, Every 12 hours    cetirizine (ZYRTEC) 10 mg, oral, Daily    cholecalciferol (Vitamin D-3) 5,000 Units tablet oral, Daily    cyanocobalamin (Vitamin B-12) 1,000 mcg tablet 1 tablet, oral, Daily    cyclobenzaprine (FLEXERIL) 10 mg, oral, Nightly PRN    fish oil concentrate (Omega-3) 120-180 mg capsule 1 g, oral, Daily    loratadine 10 mg capsule oral    magnesium oxide (Mag-Ox) 400 mg (241.3 mg magnesium) tablet 1 tablet, oral, 2 times daily    multivitamin tablet 1 tablet, oral, Daily    naproxen (NAPROSYN) 500 mg, oral, Every 12 hours    zinc gluconate 50 mg tablet 1 tablet, oral, Daily         REVIEW OF SYSTEMS    Constitutional:  Negative for appetite change, fatigue, fever and unexpected weight change.   HENT:  Negative for ear pain, hearing loss, nosebleeds, sore throat and trouble swallowing.    Eyes:  Negative for discharge, itching and visual disturbance.   Respiratory:  Negative for cough, chest tightness and shortness of breath.    Cardiovascular:  Negative for chest pain, palpitations and leg swelling.   Breast: as indicated in HPI  Gastrointestinal:  Negative for abdominal pain, constipation, diarrhea and nausea.   Endocrine: Negative for cold intolerance and heat intolerance.   Genitourinary:  Negative for dysuria, frequency, hematuria, pelvic pain and vaginal bleeding.   Musculoskeletal:  Negative for arthralgias, back pain, gait problem, joint swelling and myalgias.   Skin:  Negative for color change and rash.   Allergic/Immunologic: Negative for environmental allergies and food allergies.   Neurological:  Negative for dizziness, tremors, speech difficulty, weakness, numbness and headaches.   Hematological:  Does not bruise/bleed easily.   Psychiatric/Behavioral:  Negative for agitation, dysphoric mood and sleep disturbance. The patient is not nervous/anxious.         Past Medical History  She has a past medical history of Anxiety disorder, unspecified  (12/29/2022), Contact with and (suspected) exposure to covid-19 (11/29/2022), Fatty (change of) liver, not elsewhere classified (07/23/2021), Hypoesthesia of skin, Personal history of other diseases of the musculoskeletal system and connective tissue, Personal history of other diseases of the respiratory system (11/17/2020), and Pneumonia, unspecified organism (01/07/2020).     Physical Exam  Patient is alert and oriented x3 and in a relaxed and appropriate mood. Her gait is steady and hand grasps are equal. Sclera is clear. The breasts are nearly symmetrical. The tissue is soft without palpable abnormalities, discrete nodules or masses. The skin and nipples appear normal. There is no cervical, supraclavicular or axillary lymphadenopathy. Heart rate and rhythm normal, S1 and S2 appreciated. The lungs are clear to auscultation bilaterally. Abdomen is soft and non-tender.     Physical Exam     Last Recorded Vitals  There were no vitals filed for this visit.    Relevant Results   Time was spent discussing digital images of the radiology testing with the patient. I explained the results in depth, along with suggested explanation for follow up recommendations based on the testing results. BI-RADS Category 1    Imaging  Narrative & Impression   Interpreted By:  Silvia Villareal,   STUDY:  BI MAMMO BILATERAL SCREENING TOMOSYNTHESIS;  2/20/2024 2:48 pm      ACCESSION NUMBER(S):  [id number]      ORDERING CLINICIAN:  MASON CANALES      INDICATION:  Screening. Family history of breast cancer.      COMPARISON:  01/17/2023, 01/14/2022      FINDINGS:  2D and tomosynthesis images were reviewed at 1 mm slice thickness.      Density:  The breast tissue is extremely dense, which may limit the  sensitivity of mammography.      No suspicious masses or calcifications are identified.      IMPRESSION:  No mammographic evidence of malignancy.      BI-RADS CATEGORY:      BI-RADS Category:  1 Negative.  Recommendation:  Annual  Screening.  Recommended Date:  1 Year.  Laterality:  Bilateral.           MR breast bilateral w contrast full protocol 12/06/2023    Narrative  Interpreted By:  Manpreet Olson,  Demetri Robertson  STUDY:  BI MR BREAST BILATERAL WITH CONTRAST FULL PROTOCOL;  12/6/2023 10:57  am    ACCESSION NUMBER(S):  [id number]    ORDERING CLINICIAN:  MASON CANALES    INDICATION:  Dense breast tissue. High-risk screening. Family history of breast  cancer with her mother and aunt diagnosed.    COMPARISON:  MRI dated 10/14/2022  Mammogram dated 01/17/2023    TECHNIQUE:  Using a dedicated breast coil, STIR axial and T1-weighted fat  saturation axial images of the breasts were obtained, the latter both  before and after intravenous administration of Gadolinium DTPA. On an  independent workstation, 3-D images were formulated using EveryMove  including time enhancement curves, subtraction images and MIP images.    Intravenous contrast:  16 mL of Dotarem    FINDINGS:  There is  symmetric moderate bilateral background enhancement.    Density:  The breast tissue is heterogeneously dense, which may  obscure small masses.    RIGHT BREAST:  No suspicious mass or nonmass enhancement is  identified.    No axillary or internal mammary lymphadenopathy is appreciated.    LEFT BREAST:  No suspicious mass or nonmass enhancement is identified.    No axillary or internal mammary lymphadenopathy is appreciated.    NON-BREAST FINDINGS:  None.    Impression  No MRI evidence of malignancy in either breast.    BI-RADS CATEGORY:  BI-RADS Category:  1 Negative.  Recommendation:  Routine Screening Breast MRI in 1 Year.  Recommended Date:  1 Year.  Laterality:  Bilateral.    For any future breast imaging appointments, please call [phone] ([phone]).    I personally reviewed the images/study and I agree with the findings  as stated by resident physician Dr. Marcellus Weston.    MACRO:  None    Signed by: Manpreet Olson 12/6/2023 4:20 PM  Dictation workstation:    [id number]       Assessment/Plan   High risk surveillance care, normal clinical exam and imaging, BRCA negative, family history of breast cancer, heterogeneously dense tissue     Plan: Return in one year for bilateral screening mammogram and office visit. Pending normal MRI results, can push mammogram a couple months. Proceed to Full MRI in December 2024. Discussed Tamoxifen versus Raloxifene for risk reduction, still declines at this time. She will contact office if she decides otherwise.     Patient Discussion/Summary  Your clinical examination and imaging are normal. Please return in one year for bilateral screening mammogram and office visit or sooner if you have any problems or concerns.     High risk breast surveillance care plan:  Yearly mammogram with digital breast tomosynthesis  Twice yearly clinical breast examinations  Breast MRI - Full MRI in December 2024   Monthly self breast examinations  Vitamin D3 2000 IU/daily (over the counter)  Exercise 3-4 times per week for 45-60 minutes  Limit alcohol to 3-4 drinks per week   Eat a healthy low-fat diet with lots of fruits and vegetables  Risk models indicate personal risk of breast cancer in the next five years and lifetime (age 90)  Breast Cancer Risk Assessment Tool (Carri): 5 year risk 4.1% (average 1.4%) and lifetime risk 28.1% (average 10.6%)  Olman-Borisck: 5 year risk 4.6% (average 1.3%) and lifetime risk 27.9% (average 8.8%)   Risk model indicates you are eligible for endocrine therapy with Tamoxifen, Raloxifene or Exemestane. Endocrine therapy reduces lifetime risk of breast cancer by 50% when taken for 5 years.    You can see your health information, review clinical summaries from office visits & test results online when you follow your health with MY  Chart, a personal health record. To sign up go to [email or web address]. If you need assistance with signing up or trouble getting into your account call [facility] 24/7 at  [phone].    My office phone number is [phone] if you need to get in touch with me or have additional questions or concerns. Thank you for choosing [facility] and trusting me as your healthcare provider. I look forward to seeing you again at your next office visit. I am honored to be a provider on your health care team and I remain dedicated to helping you achieve your health goals.      Romina Johnson, APRN-CNP

## 2024-03-25 ENCOUNTER — TREATMENT (OUTPATIENT)
Dept: PHYSICAL THERAPY | Facility: CLINIC | Age: 54
End: 2024-03-25
Payer: COMMERCIAL

## 2024-03-25 DIAGNOSIS — S82.66XA NONDISPLACED FRACTURE OF LATERAL MALLEOLUS OF UNSPECIFIED FIBULA, INITIAL ENCOUNTER FOR CLOSED FRACTURE: ICD-10-CM

## 2024-03-25 PROCEDURE — 97110 THERAPEUTIC EXERCISES: CPT | Mod: GP

## 2024-03-25 PROCEDURE — 97112 NEUROMUSCULAR REEDUCATION: CPT | Mod: GP

## 2024-03-25 NOTE — PROGRESS NOTES
Physical Therapy    Physical Therapy Treatment      Patient Name: Zainab Lagunas  MRN: [id number]  Today's Date: 3/25/2024  Time Calculation  Start Time: 1135  Stop Time: 1205  Time Calculation (min): 30 min    Visit #5  [facility] AUTH FOR 10-12v 1/25-4/15/24    Assessment:  Pt showed good control with multi-plane ankle step-ups and step downs without any pain. Some balance deficits noted when unable to use UE which is likely from period of immobility after injury. Issued TB progressions for ankle strengthening and added more SL balance exercises to HEP.       Plan:   Cont to address gross ankle strengthening and begin introducing variations of eccentric and isometric stability during dynamic movements and activities.    Current Problem:   1. Nondisplaced fracture of lateral malleolus of unspecified fibula, initial encounter for closed fracture  Follow Up In Physical Therapy          Subjective    General:   Pt doing well, denies any pain lately.     Pain:   0/10 lateral R ankle          Objective   Treatments:  Therapeutic Exercise 92883: 15 min  (11:35-11:50)  Nu-step Lv4 7 min  Ankle inversion vs. BTB loop 2x10 (added to HEP)  Ankle eversion vs. BTB loop 2x20 (added to HEP)  Eccentric heel raises x 20 each on edge of step (soleus, gastroc) (added to HEP)  Standing Toe Raises x15  Marches with ankle DF vs. GTB 2x10   Side stepping with GTB around feet x20 ea    Neuromuscular Re-education 09227: 15 min  (11:50-12:05)  SLS :30 x5 ea leg on AirEx  Tandem stepping on airex strip 8 laps, 5 steps per lap  Side stepping on airex strip 8 laps, 5 steps per lap  Tilt Board AP 2 min  Tilt Board Med/Lat 2 min        EDUCATION:   Pt provided education on the importance of eccentric control during loading phase of gait. She was instructed to modify exercises and continue with once a day to prevent excessive fatigue with HEP    Goals:  Patient will be independent with HEP.    Patient will improve R ankle inversion & eversion  strength to >/=4+/5 for improved ankle and foot stability with ambulation.    Patient will improve R ankle plantarflexion strength to >/=4+/5 for improved propulsion with gait.    Patient will improve R ankle dorsiflexion strength to >/=4+/5 for improved foot clearance and heel strike with ambulation.    Patient will report 0/10 pain with ambulation.    Patient will improve 1st MT extension strength to >/=4+/5 for improved push-off gait mechanics with ambulation.    Patient will improve LEFS score to >/=75/80

## 2024-04-01 ENCOUNTER — APPOINTMENT (OUTPATIENT)
Dept: PHYSICAL THERAPY | Facility: CLINIC | Age: 54
End: 2024-04-01
Payer: COMMERCIAL

## 2024-04-03 ENCOUNTER — APPOINTMENT (OUTPATIENT)
Dept: SURGICAL ONCOLOGY | Facility: HOSPITAL | Age: 54
End: 2024-04-03
Payer: COMMERCIAL

## 2024-04-04 ENCOUNTER — TREATMENT (OUTPATIENT)
Dept: PHYSICAL THERAPY | Facility: CLINIC | Age: 54
End: 2024-04-04
Payer: COMMERCIAL

## 2024-04-04 DIAGNOSIS — S82.66XA NONDISPLACED FRACTURE OF LATERAL MALLEOLUS OF UNSPECIFIED FIBULA, INITIAL ENCOUNTER FOR CLOSED FRACTURE: ICD-10-CM

## 2024-04-04 PROCEDURE — 97110 THERAPEUTIC EXERCISES: CPT | Mod: GP

## 2024-04-04 NOTE — PROGRESS NOTES
Physical Therapy    Physical Therapy Treatment      Patient Name: Zainab Lagunas  MRN: [id number]  Today's Date: 4/4/2024  Time Calculation  Start Time: 1207  Stop Time: 1245  Time calculation: 38 min    Visit #6  [facility] AUTH FOR 10-12v 1/25-4/15/24    Assessment:  Pt able to perform all exercises without pain in foot or ankle. Discussed current progeress with patient and will conduct re-assessment next visit and if goals are met patient will be discharged.       Plan:   Re-check next visit.    Current Problem:   No diagnosis found.      Subjective    General:   Pt doing well, states she has had some blood sugar issues resulting in needing to cancel her last visit. Denies any foot or ankle pain lately.     Pain:   0/10 lateral R ankle          Objective   Treatments:  Therapeutic Exercise 68985: 38 min  (12:07-12:45)  Elliptical Lv4 8 min  Ankle inversion vs. BTB loop 2x10 (added to HEP)  Ankle eversion vs. BTB loop 2x20 (added to HEP)  Eccentric heel raises x 20 each on edge of step (soleus, gastroc) (added to HEP)  SL Heel raises 2x15  Heel raises with tennis ball inversion 2x20  Standing Toe Raises x15  Marches with ankle DF vs. GTB 2x10   Side stepping with GTB around feet x20 ea  SLS :30 x5 ea leg on AirEx  Tandem stepping on airex strip 8 laps, 5 steps per lap  Side stepping on airex strip 8 laps, 5 steps per lap  Tilt Board AP 2 min  Tilt Board Med/Lat 2 min        EDUCATION:   Pt provided education on the importance of eccentric control during loading phase of gait. She was instructed to modify exercises and continue with once a day to prevent excessive fatigue with HEP    Goals:  Patient will be independent with HEP.    Patient will improve R ankle inversion & eversion strength to >/=4+/5 for improved ankle and foot stability with ambulation.    Patient will improve R ankle plantarflexion strength to >/=4+/5 for improved propulsion with gait.    Patient will improve R ankle dorsiflexion strength to >/=4+/5  for improved foot clearance and heel strike with ambulation.    Patient will report 0/10 pain with ambulation.    Patient will improve 1st MT extension strength to >/=4+/5 for improved push-off gait mechanics with ambulation.    Patient will improve LEFS score to >/=75/80

## 2024-04-08 ENCOUNTER — APPOINTMENT (OUTPATIENT)
Dept: PHYSICAL THERAPY | Facility: CLINIC | Age: 54
End: 2024-04-08
Payer: COMMERCIAL

## 2024-04-10 ENCOUNTER — OFFICE VISIT (OUTPATIENT)
Dept: PRIMARY CARE | Facility: CLINIC | Age: 54
End: 2024-04-10
Payer: COMMERCIAL

## 2024-04-10 ENCOUNTER — APPOINTMENT (OUTPATIENT)
Dept: PHYSICAL THERAPY | Facility: CLINIC | Age: 54
End: 2024-04-10
Payer: COMMERCIAL

## 2024-04-10 VITALS
TEMPERATURE: 98.2 F | HEIGHT: 63 IN | BODY MASS INDEX: 32.43 KG/M2 | OXYGEN SATURATION: 97 % | SYSTOLIC BLOOD PRESSURE: 116 MMHG | DIASTOLIC BLOOD PRESSURE: 73 MMHG | HEART RATE: 84 BPM | WEIGHT: 183 LBS

## 2024-04-10 DIAGNOSIS — S39.012D STRAIN OF LUMBAR REGION, SUBSEQUENT ENCOUNTER: ICD-10-CM

## 2024-04-10 DIAGNOSIS — R73.09 ELEVATED GLUCOSE: ICD-10-CM

## 2024-04-10 DIAGNOSIS — R42 DIZZINESS: Primary | ICD-10-CM

## 2024-04-10 DIAGNOSIS — E55.9 VITAMIN D DEFICIENCY: ICD-10-CM

## 2024-04-10 PROCEDURE — 99214 OFFICE O/P EST MOD 30 MIN: CPT | Performed by: PHYSICIAN ASSISTANT

## 2024-04-10 PROCEDURE — 3008F BODY MASS INDEX DOCD: CPT | Performed by: PHYSICIAN ASSISTANT

## 2024-04-10 RX ORDER — NAPROXEN 500 MG/1
500 TABLET ORAL EVERY 12 HOURS
Qty: 90 TABLET | Refills: 0 | Status: SHIPPED | OUTPATIENT
Start: 2024-04-10

## 2024-04-10 NOTE — PROGRESS NOTES
"Subjective   Patient ID: Zainab Lagunas is a 54 y.o. female who presents for Diabetes.    HPI   Pt was feeling numbness in her toes and her legs were feeling weak.   She felt off, she felt lightheaded, her hands tight, feet are numb and tingling. She is not feeling well with her whole body.    has a glucometer.   She stops eating at 7pm  127 highest, usually around 120's in AM.  Night 106 average  Patient complains of checking sugars and the has been above 100, says her body has been hurting and she fell off so that's why she checked it.     Pt doesn't have a FH of DM. No recent weight gain. She denies excessive urination/excessive thirst.   Hasn't been as active as usual, limited b/c of fractures.   57118 steps a day at work.       Review of Systems  Constitutional: Patient denies any fever, chills, loss of appetite, or unexplained weight loss.  Cardiovascular: Patient denies any chest pain, shortness of breath with exertion, tachycardia, palpitations, orthopnea, or paroxysmal nocturnal dyspnea.  Respiratory: Patient denies any cough, shortness breath, or wheezing.  Gastrointestinal patient denies any nausea, vomiting, diarrhea, constipation, melena, hematochezia, or reflux symptoms  Skin: Denies any rashes or skin lesions   Neurology: Patient denies any new motor or sensory losses.  Denies any numbness, tingling, weakness, and incoordination of the extremities.  Patient also denies any tremor, seizures, or gait instability.  Endocrinology: Denies any polyuria, polydipsia, polyphagia, or heat/cold intolerance.  Objective   /73   Pulse 84   Temp 36.8 °C (98.2 °F)   Ht 1.6 m (5' 3\")   Wt 83 kg (183 lb)   SpO2 97%   BMI 32.42 kg/m²     Physical Exam  Gen. appearance: Alert and cooperative, in no acute distress, [well-developed/well-nourished or obese male or female].  Neck: Supple and without adenopathy or rigidity.  There is no JVD at 90° and no carotid bruits are noted.  There is no " thyromegaly, thyroid tenderness, or palpable thyroid nodules.  Heart: Regular rate and rhythm without murmur or ectopy.  Lungs: Lungs are clear to auscultation bilaterally with good air exchange.  Skin: Good turgor, moist, warm and without rashes or lesions.  Neurological exam: Alert and oriented ×3, no tremor, normal gait.  Extremities: No clubbing, cyanosis, or edema    Assessment/Plan   Diagnoses and all orders for this visit:  Dizziness- she thinks it's related to her slightly elevated sugars.  -     CBC and Auto Differential; Future  -     TSH with reflex to Free T4 if abnormal; Future  Pt is to have labs done and we will call her with those results once they are reviewed.  Elevated glucose  -     Comprehensive Metabolic Panel; Future  -     Hemoglobin A1C; Future  -     Lipid Panel; Future  A1c was ordered and we will call her with those results once they are reviewed. Small, frequent, meals discussed. Exercised encouraged.    Vitamin D deficiency  -     Vitamin D 1,25 Dihydroxy (for eval of hypercalcemia); Future  She is currently not on supplementation, over due for lab.     Strain of lumbar region, subsequent encounter  -     naproxen (Naprosyn) 500 mg tablet; Take 1 tablet (500 mg) by mouth every 12 hours.  Sx stable with PRN use of Naproxen.    Pt is RTC in 3 mo or sooner depending on labs.    Patient understands that should they have testing outside   facilities that we may not receive the results and was told to call us if they have not heard from our office within a week after testing.    [facility] uses voice recognition technology for dictations. Sometimes the software misinterprets words. Please take this into account when reading this.

## 2024-04-11 ENCOUNTER — LAB (OUTPATIENT)
Dept: LAB | Facility: LAB | Age: 54
End: 2024-04-11
Payer: COMMERCIAL

## 2024-04-11 DIAGNOSIS — R73.09 ELEVATED GLUCOSE: ICD-10-CM

## 2024-04-11 DIAGNOSIS — E55.9 VITAMIN D DEFICIENCY: ICD-10-CM

## 2024-04-11 DIAGNOSIS — R42 DIZZINESS: ICD-10-CM

## 2024-04-11 LAB
ALBUMIN SERPL BCP-MCNC: 4.6 G/DL (ref 3.4–5)
ALP SERPL-CCNC: 60 U/L (ref 33–110)
ALT SERPL W P-5'-P-CCNC: 15 U/L (ref 7–45)
ANION GAP SERPL CALC-SCNC: 11 MMOL/L (ref 10–20)
AST SERPL W P-5'-P-CCNC: 12 U/L (ref 9–39)
BASOPHILS # BLD AUTO: 0.05 X10*3/UL (ref 0–0.1)
BASOPHILS NFR BLD AUTO: 0.8 %
BILIRUB SERPL-MCNC: 0.7 MG/DL (ref 0–1.2)
BUN SERPL-MCNC: 13 MG/DL (ref 6–23)
CALCIUM SERPL-MCNC: 9.5 MG/DL (ref 8.6–10.3)
CHLORIDE SERPL-SCNC: 106 MMOL/L (ref 98–107)
CHOLEST SERPL-MCNC: 216 MG/DL (ref 0–199)
CHOLESTEROL/HDL RATIO: 4.7
CO2 SERPL-SCNC: 28 MMOL/L (ref 21–32)
CREAT SERPL-MCNC: 0.63 MG/DL (ref 0.5–1.05)
EGFRCR SERPLBLD CKD-EPI 2021: >90 ML/MIN/1.73M*2
EOSINOPHIL # BLD AUTO: 0.25 X10*3/UL (ref 0–0.7)
EOSINOPHIL NFR BLD AUTO: 3.9 %
ERYTHROCYTE [DISTWIDTH] IN BLOOD BY AUTOMATED COUNT: 13.4 % (ref 11.5–14.5)
EST. AVERAGE GLUCOSE BLD GHB EST-MCNC: 108 MG/DL
GLUCOSE SERPL-MCNC: 99 MG/DL (ref 74–99)
HBA1C MFR BLD: 5.4 %
HCT VFR BLD AUTO: 40.7 % (ref 36–46)
HDLC SERPL-MCNC: 46.1 MG/DL
HGB BLD-MCNC: 13.6 G/DL (ref 12–16)
IMM GRANULOCYTES # BLD AUTO: 0.02 X10*3/UL (ref 0–0.7)
IMM GRANULOCYTES NFR BLD AUTO: 0.3 % (ref 0–0.9)
LDLC SERPL CALC-MCNC: 148 MG/DL
LYMPHOCYTES # BLD AUTO: 2.05 X10*3/UL (ref 1.2–4.8)
LYMPHOCYTES NFR BLD AUTO: 32 %
MCH RBC QN AUTO: 30.8 PG (ref 26–34)
MCHC RBC AUTO-ENTMCNC: 33.4 G/DL (ref 32–36)
MCV RBC AUTO: 92 FL (ref 80–100)
MONOCYTES # BLD AUTO: 0.51 X10*3/UL (ref 0.1–1)
MONOCYTES NFR BLD AUTO: 8 %
NEUTROPHILS # BLD AUTO: 3.53 X10*3/UL (ref 1.2–7.7)
NEUTROPHILS NFR BLD AUTO: 55 %
NON HDL CHOLESTEROL: 170 MG/DL (ref 0–149)
NRBC BLD-RTO: 0 /100 WBCS (ref 0–0)
PLATELET # BLD AUTO: 240 X10*3/UL (ref 150–450)
POTASSIUM SERPL-SCNC: 4.7 MMOL/L (ref 3.5–5.3)
PROT SERPL-MCNC: 7.3 G/DL (ref 6.4–8.2)
RBC # BLD AUTO: 4.42 X10*6/UL (ref 4–5.2)
SODIUM SERPL-SCNC: 140 MMOL/L (ref 136–145)
TRIGL SERPL-MCNC: 109 MG/DL (ref 0–149)
TSH SERPL-ACNC: 1.95 MIU/L (ref 0.44–3.98)
VLDL: 22 MG/DL (ref 0–40)
WBC # BLD AUTO: 6.4 X10*3/UL (ref 4.4–11.3)

## 2024-04-11 PROCEDURE — 85025 COMPLETE CBC W/AUTO DIFF WBC: CPT

## 2024-04-11 PROCEDURE — 82652 VIT D 1 25-DIHYDROXY: CPT

## 2024-04-11 PROCEDURE — 84443 ASSAY THYROID STIM HORMONE: CPT

## 2024-04-11 PROCEDURE — 80061 LIPID PANEL: CPT

## 2024-04-11 PROCEDURE — 80053 COMPREHEN METABOLIC PANEL: CPT

## 2024-04-11 PROCEDURE — 36415 COLL VENOUS BLD VENIPUNCTURE: CPT

## 2024-04-11 PROCEDURE — 83036 HEMOGLOBIN GLYCOSYLATED A1C: CPT

## 2024-04-12 LAB — 1,25(OH)2D3 SERPL-MCNC: 60.9 PG/ML (ref 19.9–79.3)

## 2024-04-15 ENCOUNTER — APPOINTMENT (OUTPATIENT)
Dept: PHYSICAL THERAPY | Facility: CLINIC | Age: 54
End: 2024-04-15
Payer: COMMERCIAL

## 2024-04-16 ENCOUNTER — HOSPITAL ENCOUNTER (OUTPATIENT)
Dept: RADIOLOGY | Facility: CLINIC | Age: 54
Discharge: HOME | End: 2024-04-16
Payer: COMMERCIAL

## 2024-04-16 DIAGNOSIS — M25.572 PAIN IN LEFT ANKLE AND JOINTS OF LEFT FOOT: ICD-10-CM

## 2024-04-16 PROCEDURE — 73610 X-RAY EXAM OF ANKLE: CPT | Mod: LEFT SIDE | Performed by: RADIOLOGY

## 2024-04-16 PROCEDURE — 73610 X-RAY EXAM OF ANKLE: CPT | Mod: LT

## 2024-04-17 ENCOUNTER — OFFICE VISIT (OUTPATIENT)
Dept: ORTHOPEDIC SURGERY | Facility: CLINIC | Age: 54
End: 2024-04-17
Payer: COMMERCIAL

## 2024-04-17 DIAGNOSIS — S82.62XD CLOSED FRACTURE OF DISTAL LATERAL MALLEOLUS OF ANKLE WITH ROUTINE HEALING, LEFT: Primary | ICD-10-CM

## 2024-04-17 PROCEDURE — 99214 OFFICE O/P EST MOD 30 MIN: CPT | Mod: 57 | Performed by: INTERNAL MEDICINE

## 2024-04-17 PROCEDURE — 27786 TREATMENT OF ANKLE FRACTURE: CPT | Performed by: INTERNAL MEDICINE

## 2024-04-17 PROCEDURE — 99214 OFFICE O/P EST MOD 30 MIN: CPT | Performed by: INTERNAL MEDICINE

## 2024-04-17 PROCEDURE — L4361 PNEUMA/VAC WALK BOOT PRE OTS: HCPCS | Performed by: INTERNAL MEDICINE

## 2024-04-17 RX ORDER — HYDROCODONE BITARTRATE AND ACETAMINOPHEN 5; 325 MG/1; MG/1
1 TABLET ORAL EVERY 8 HOURS PRN
Qty: 10 TABLET | Refills: 0 | Status: SHIPPED | OUTPATIENT
Start: 2024-04-17 | End: 2024-04-24

## 2024-04-17 NOTE — LETTER
April 17, 2024     Patient: Zainab Lagunas   YOB: 1970   Date of Visit: 4/17/2024       To Whom It May Concern:    Zainab Lagunas was seen in my clinic on 4/17/2024 at 8:00 am. Please excuse Zainab for her absence from work on this day to make the appointment. Please allow her to return to work on 4/22/24 with sit down job / work only and must wear boot restrictions at all times until seen after follow up in 3-4 weeks.    If you have any questions or concerns, please don't hesitate to call.         Sincerely,         Tressa Guzman MD

## 2024-04-17 NOTE — PROGRESS NOTES
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 female presents today for evaluation for  work related acute left ankle injury sustained yesterday when she twisted her left ankle. She went to the ER where x-rays were taken, she was found of a fracture and placed into a posterior leg splint. She is here for initial evaluation.        Review of Systems   GENERAL: Negative for malaise, significant weight loss, fever  MUSCULOSKELETAL: See HPI  NEURO:  Negative for numbness / tingling     Past Medical History  Past Medical History:   Diagnosis Date    Anxiety disorder, unspecified 12/29/2022    Anxiety    Contact with and (suspected) exposure to covid-19 11/29/2022    Suspected COVID-19 virus infection    Fatty (change of) liver, not elsewhere classified 07/23/2021    NAFLD (nonalcoholic fatty liver disease)    Hypoesthesia of skin     Hypesthesia    Personal history of other diseases of the musculoskeletal system and connective tissue     History of arthritis    Personal history of other diseases of the respiratory system 11/17/2020    History of acute bronchitis    Pneumonia, unspecified organism 01/07/2020    Pneumonia       Medication review  Medication Documentation Review Audit       Reviewed by Yanni Mancilla MA (Technologist) on 04/17/24 at 0809      Medication Order Taking? Sig Documenting Provider Last Dose Status   albuterol 90 mcg/actuation inhaler [id number] No Inhale 1 puff 3 times a day. Radha Quiroz PA-C Taking Active   ALPRAZolam (Xanax) 0.25 mg tablet [id number] No Take 1 tablet (0.25 mg) by mouth as needed at bedtime for anxiety. Historical Provider, MD Taking Active   ascorbic acid (Vitamin C) 500 mg tablet [id number] No Take 1 tablet (500 mg) by mouth once daily. Historical Provider, MD Taking Active   budesonide-formoteroL (Symbicort) 160-4.5  mcg/actuation inhaler [id number] No Inhale 2 puffs every 12 hours. Radha Quiroz PA-C Taking Active   cetirizine (ZyrTEC) 10 mg tablet [id number] No Take 1 tablet (10 mg) by mouth once daily. Historical Provider, MD Taking Active   cholecalciferol (Vitamin D-3) 5,000 Units tablet [id number] No Take by mouth once daily. Historical Provider, MD Not Taking Active   cyanocobalamin (Vitamin B-12) 1,000 mcg tablet [id number] No Take 1 tablet (1,000 mcg) by mouth once daily. Historical Provider, MD Taking Active   cyclobenzaprine (Flexeril) 10 mg tablet [id number] No Take 1 tablet (10 mg) by mouth as needed at bedtime for muscle spasms. Radha Quiroz PA-C Taking Active   fish oil concentrate (Omega-3) 120-180 mg capsule [id number] No Take 1 capsule (1 g) by mouth once daily. Historical Provider, MD Taking Active   loratadine 10 mg capsule [id number] No Take by mouth. Historical Provider, MD Not Taking Active   magnesium oxide (Mag-Ox) 400 mg (241.3 mg magnesium) tablet [id number] No Take 1 tablet (400 mg) by mouth 2 times a day. Historical Provider, MD Taking Active   multivitamin tablet [id number] No Take 1 tablet by mouth once daily. Historical Provider, MD Taking Active   naproxen (Naprosyn) 500 mg tablet [id number]  Take 1 tablet (500 mg) by mouth every 12 hours. Radha Quiroz PA-C  Active   zinc gluconate 50 mg tablet [id number] No Take 1 tablet (50 mg) by mouth once daily. Historical Provider, MD Not Taking Active                    Allergies  Allergies   Allergen Reactions    Other Unknown       Social History  Social History     Socioeconomic History    Marital status:      Spouse name: Not on file    Number of children: Not on file    Years of education: Not on file    Highest education level: Not on file   Occupational History    Not on file   Tobacco Use    Smoking status: Some Days     Current packs/day: 1.00     Average packs/day: 1 pack/day for 25.0 years (25.0 ttl pk-yrs)     Types: Cigarettes     Smokeless tobacco: Never   Substance and Sexual Activity    Alcohol use: Yes     Comment: occ    Drug use: Never    Sexual activity: Not on file   Other Topics Concern    Not on file   Social History Narrative    Not on file     Social Determinants of Health     Financial Resource Strain: Not on file   Food Insecurity: Not on file   Transportation Needs: Not on file   Physical Activity: Not on file   Stress: Not on file   Social Connections: Not on file   Intimate Partner Violence: Not on file   Housing Stability: Not on file       Surgical History  Past Surgical History:   Procedure Laterality Date    HYSTEROSCOPY  02/15/2016    Hysteroscopy With Endometrial Ablation    OTHER SURGICAL HISTORY  02/18/2020    Endometrial ablation    TUBAL LIGATION  02/15/2016    Tubal Ligation       Physical Exam:  GENERAL:  Patient is awake, alert, and oriented to person place and time.  Patient appears well nourished and well kept.  Affect Calm, Not Acutely Distressed.  HEENT:  Normocephalic, Atraumatic, EOMI  CARDIOVASCULAR:  Hemodynamically stable.  RESPIRATORY:  Normal respirations with unlabored breathing.  Extremity: Left ankle shows skin is intact.  There is no erythema or warmth.  Swelling localized on the lateral aspect.  There is no clinical signs of infection.  There is significant pain over the lateral malleolus.  There is no pain of the medial malleolus.  There is no pain over the ATF, CF or PTF ligament.  There is no pain over the deltoid ligament.  No pain over the Achilles tendon.  Negative Nunez's test.  Negative squeeze test.  Negative anterior drawer test.  Negative talar tilt test.  No pain over the anterior process of the talus.  There is no pain over the talar dome.  There is no pain at the base of the fifth metatarsal bone.  No pain of the calcaneus.  No pain over the plantar aponeurosis.  No pain of the midfoot.  Neurovascularly intact.      Diagnostics: X-rays reviewed  XR ankle left 3+ views  Narrative:  Interpreted By:  Martell Dobbins,   STUDY:  XR ANKLE LEFT 3+ VIEWS; ;  4/16/2024 6:37 pm      INDICATION:  Signs/Symptoms:injury.      COMPARISON:  None.      ACCESSION NUMBER(S):  [id number]      ORDERING CLINICIAN:  MASON LANZA      FINDINGS:  Left ankle, three views      There is a nondisplaced transverse fracture through the distal tip of  the fibula. Lateral malleolar soft tissue edema seen. No other  fracture. The ankle mortise maintained medial no degenerative change.  There is an ankle effusion.      Impression: Nondisplaced transverse fracture through the distal tip of the  fibula. Soft tissue edema.          MACRO:  None      Signed by: Martell Dobbins 4/16/2024 6:52 PM  Dictation workstation:   [id number]      Procedure: None    Assessment: Acute nondisplaced lateral malleolus fracture of the left ankle    Plan: Zainab presents today for initial evaluation for a work related injury sustained yesterday. She has a nondisplaced lateral malleolus fracture. We recommended non surgical treatment by placing here into a fracture boot or a short leg walking cast, patient elected for the fracture boot.  She may weight-bear as tolerated.  She will follow-up in three weeks, repeat x-rays of the left ankle, 3 views, AP, lateral, oblique views. Will return to work on Monday with sitdown job only and must wear fracture boot.  Will add a diagnosis of nondisplaced lateral malleolus fracture to her claim.  Will submit for C9 for physical therapy to begin in 6 weeks.  Will submit for C9 for a lace up ankle brace.    No orders of the defined types were placed in this encounter.     At the conclusion of the visit there were no further questions by the patient/family regarding their plan of care.  Patient was instructed to call or return with any issues, questions, or concerns regarding their injury and/or treatment plan described above.     04/17/24 at 8:18 AM - Tressa Guzman MD  Scribe Attestation  By  signing my name below, I, Tom Aguilar   attest that this documentation has been prepared under the direction and in the presence of Tressa Guzman MD.    Office: [phone]    This note was prepared using voice recognition software.  The details of this note are correct and have been reviewed, and corrected to the best of my ability.  Some grammatical errors may persist related to the Dragon software.

## 2024-04-18 ENCOUNTER — APPOINTMENT (OUTPATIENT)
Dept: PHYSICAL THERAPY | Facility: CLINIC | Age: 54
End: 2024-04-18
Payer: COMMERCIAL

## 2024-04-22 DIAGNOSIS — Z78.0 POSTMENOPAUSAL: Primary | ICD-10-CM

## 2024-04-24 ENCOUNTER — PATIENT MESSAGE (OUTPATIENT)
Dept: PRIMARY CARE | Facility: CLINIC | Age: 54
End: 2024-04-24
Payer: COMMERCIAL

## 2024-04-24 ENCOUNTER — HOSPITAL ENCOUNTER (OUTPATIENT)
Dept: RADIOLOGY | Facility: CLINIC | Age: 54
Discharge: HOME | End: 2024-04-24
Payer: COMMERCIAL

## 2024-04-24 DIAGNOSIS — Z78.0 POSTMENOPAUSAL: ICD-10-CM

## 2024-04-24 PROCEDURE — 77080 DXA BONE DENSITY AXIAL: CPT

## 2024-04-25 ENCOUNTER — APPOINTMENT (OUTPATIENT)
Dept: RADIOLOGY | Facility: CLINIC | Age: 54
End: 2024-04-25
Payer: COMMERCIAL

## 2024-04-25 ENCOUNTER — APPOINTMENT (OUTPATIENT)
Dept: PHYSICAL THERAPY | Facility: CLINIC | Age: 54
End: 2024-04-25
Payer: COMMERCIAL

## 2024-04-26 ENCOUNTER — APPOINTMENT (OUTPATIENT)
Dept: PHYSICAL THERAPY | Facility: CLINIC | Age: 54
End: 2024-04-26
Payer: COMMERCIAL

## 2024-04-26 ENCOUNTER — TELEPHONE (OUTPATIENT)
Dept: ORTHOPEDIC SURGERY | Facility: CLINIC | Age: 54
End: 2024-04-26
Payer: COMMERCIAL

## 2024-04-26 ENCOUNTER — APPOINTMENT (OUTPATIENT)
Dept: RADIOLOGY | Facility: CLINIC | Age: 54
End: 2024-04-26
Payer: COMMERCIAL

## 2024-04-26 NOTE — TELEPHONE ENCOUNTER
4/26/24 - walking boot (left side) - dispensed 4/17/24  Ankle lace up - for follow up on 5/9/24    All paperwork faxed to [facility] ([address] [employer]) for approval  NOTE:  this is for the left side only  **patient also had [facility] claim for the right side in Jan 2024 for exact same products**

## 2024-05-01 ENCOUNTER — TELEPHONE (OUTPATIENT)
Dept: ORTHOPEDIC SURGERY | Facility: CLINIC | Age: 54
End: 2024-05-01
Payer: COMMERCIAL

## 2024-05-01 ENCOUNTER — APPOINTMENT (OUTPATIENT)
Dept: PHYSICAL THERAPY | Facility: CLINIC | Age: 54
End: 2024-05-01
Payer: COMMERCIAL

## 2024-05-01 NOTE — TELEPHONE ENCOUNTER
5/1/24 - Walking boot - dispensed 4/17/24 approved by [facility]  Ankle lace up - for follow up - approved by [facility]

## 2024-05-07 ASSESSMENT — PROMIS GLOBAL HEALTH SCALE
RATE_GENERAL_HEALTH: GOOD
RATE_QUALITY_OF_LIFE: VERY GOOD
CARRYOUT_PHYSICAL_ACTIVITIES: MODERATELY
RATE_AVERAGE_PAIN: 3
RATE_PHYSICAL_HEALTH: GOOD
EMOTIONAL_PROBLEMS: SOMETIMES
RATE_AVERAGE_FATIGUE: MODERATE
RATE_SOCIAL_SATISFACTION: GOOD
CARRYOUT_SOCIAL_ACTIVITIES: GOOD
RATE_MENTAL_HEALTH: GOOD

## 2024-05-08 ENCOUNTER — OFFICE VISIT (OUTPATIENT)
Dept: PRIMARY CARE | Facility: CLINIC | Age: 54
End: 2024-05-08
Payer: COMMERCIAL

## 2024-05-08 VITALS
DIASTOLIC BLOOD PRESSURE: 82 MMHG | WEIGHT: 185.1 LBS | OXYGEN SATURATION: 96 % | SYSTOLIC BLOOD PRESSURE: 115 MMHG | TEMPERATURE: 97.9 F | BODY MASS INDEX: 32.8 KG/M2 | HEART RATE: 82 BPM | HEIGHT: 63 IN

## 2024-05-08 DIAGNOSIS — Z79.899 HIGH RISK MEDICATION USE: ICD-10-CM

## 2024-05-08 DIAGNOSIS — E66.09 CLASS 1 OBESITY DUE TO EXCESS CALORIES WITHOUT SERIOUS COMORBIDITY WITH BODY MASS INDEX (BMI) OF 32.0 TO 32.9 IN ADULT: Primary | ICD-10-CM

## 2024-05-08 PROCEDURE — 80324 DRUG SCREEN AMPHETAMINES 1/2: CPT

## 2024-05-08 PROCEDURE — 80307 DRUG TEST PRSMV CHEM ANLYZR: CPT

## 2024-05-08 PROCEDURE — 99213 OFFICE O/P EST LOW 20 MIN: CPT | Performed by: PHYSICIAN ASSISTANT

## 2024-05-08 PROCEDURE — 3008F BODY MASS INDEX DOCD: CPT | Performed by: PHYSICIAN ASSISTANT

## 2024-05-08 RX ORDER — PHENTERMINE HYDROCHLORIDE 37.5 MG/1
37.5 TABLET ORAL
Qty: 30 TABLET | Refills: 0 | Status: SHIPPED | OUTPATIENT
Start: 2024-05-08 | End: 2024-06-05 | Stop reason: SDUPTHER

## 2024-05-08 NOTE — PROGRESS NOTES
Subjective   Patient ID: Zainab Lagunas is a 54 y.o. female who presents for Gynecologic Exam.    HPI   Mammo done 2/24  DEXA 4/24  Colon '22, aldo 5 y  Labs: 4/11/24  Pap due Nov '25, NL pap 11/22 HPV- still with same partner    6/28/24 last fill of Adipex  Diet is attempted but no exercise b/c of fracture.   Patient would like to discuss Adipex RX again. She did a 3 mo trial that ended in June '23 and lost 15# at that time.   She has gained 5# since Feb since she is unable to exercise.     OARRS:  No data recorded  I have personally reviewed the OARRS report for Zainab Lagunas. I have considered the risks of abuse, dependence, addiction and diversion and I believe that it is clinically appropriate for Zainab Lagunas to be prescribed this medication    Is the patient prescribed a combination of a benzodiazepine and opioid?  No    Last Urine Drug Screen / ordered today: Yes  No results found for this or any previous visit (from the past 8760 hour(s)).  N/A          Controlled Substance Agreement:  Date of the Last Agreement: today, 5/8/24      Reviewed Controlled Substance Agreement including but not limited to the benefits, risks, and alternatives to treatment with a Controlled Substance medication(s).    Anorexiants:   What is the patient's goal of therapy? Wt loss  Is this being achieved with current treatment? No, she cannot exercise due to broken foot    I have assessed the patient's continuing efforts to lose weight., I have assessed the patient's dedication to the treatment program and the response to treatment., and I have assessed the presence or absence of contraindications, adverse effects, and indicators of possible substance abuse that would necessitate cessation of treatment utilizing controlled substance.    Patient has demonstrated continued efforts to lose weight, is dedicated to the treatment program and the response to treatment. and I have assessed for the presence or absence of  "contraindications, adverse effects, and indicators of possible substance abuse that would necessitate cessation of treatment utilizing controlled substance.    Activities of Daily Living:   Is your overall impression that this patient is benefiting (symptom reduction outweighs side effects) from anorexiants therapy? Yes     1. Physical Functioning: Same  2. Family Relationship: Same  3. Social Relationship: Same  4. Mood: Same  5. Sleep Patterns: Same  6. Overall Function: Same    Review of Systems  Constitutional: Patient denies any fever, chills, loss of appetite, or unexplained weight loss.  Cardiovascular: Patient denies any chest pain, shortness of breath with exertion, tachycardia, palpitations, orthopnea, or paroxysmal nocturnal dyspnea.  Respiratory: Patient denies any cough, shortness breath, or wheezing.  Gastrointestinal patient denies any nausea, vomiting, diarrhea, constipation, melena, hematochezia, or reflux symptoms  Skin: Denies any rashes or skin lesions   Neurology: Patient denies any new motor or sensory losses.  Denies any numbness, tingling, weakness, and incoordination of the extremities.  Patient also denies any tremor, seizures, or gait instability.  Endocrinology: Denies any polyuria, polydipsia, polyphagia, or heat/cold intolerance.    Objective   /82   Pulse 82   Temp 36.6 °C (97.9 °F)   Ht 1.6 m (5' 3\")   Wt 84 kg (185 lb 1.6 oz)   SpO2 96%   BMI 32.79 kg/m²     Physical Exam  Gen. appearance: Alert and cooperative, no acute distress, well-developed, well-nourished obese female.  Neck: Supple and without adenopathy or rigidity.  There is no JVD at 90° and no carotid bruits are noted.  Cardiovascular: Heart has a regular rate and rhythm without murmur or ectopy.  Respiratory: Lungs are clear to auscultation bilaterally with good air exchange.      Assessment/Plan   Diagnoses and all orders for this visit:  Class 1 obesity due to excess calories without serious comorbidity with " body mass index (BMI) of 32.0 to 32.9 in adult-   Patient would like to restart Adipex after being off of it for 8 months.  She states that she has gained weight because she is unable to exercise due to her broken foot.  She does try to watch what she eats and still gets at least 18,000 steps per day with her walking boot on.   We discussed the risks/benefits/side effects of Adipex at length again. She/He understands similar medications have had adverse effects on heart valves in the past and there is no guarantee that Adipex cannot have similar side effects. We discussed that the medication is potentially abusable and potentially addictive. We again discussed that she/he can only continue on the medication for 3 months before needing a 6 month drug-free interval. We discussed that we will need to discontinue the medication should she/he fail to lose weight.  I see no signs of substance abuse today    High risk medication use  -     Drug Screen, Urine With Reflex to Confirmation  UDS was done today, CSC was also done.    Patient is to return to the clinic in 4 weeks for reevaluation of weight.

## 2024-05-09 ENCOUNTER — OFFICE VISIT (OUTPATIENT)
Dept: ORTHOPEDIC SURGERY | Facility: CLINIC | Age: 54
End: 2024-05-09
Payer: COMMERCIAL

## 2024-05-09 ENCOUNTER — HOSPITAL ENCOUNTER (OUTPATIENT)
Dept: RADIOLOGY | Facility: CLINIC | Age: 54
Discharge: HOME | End: 2024-05-09
Payer: COMMERCIAL

## 2024-05-09 DIAGNOSIS — S82.62XD CLOSED FRACTURE OF DISTAL LATERAL MALLEOLUS OF ANKLE WITH ROUTINE HEALING, LEFT: Primary | ICD-10-CM

## 2024-05-09 DIAGNOSIS — S82.62XD CLOSED FRACTURE OF DISTAL LATERAL MALLEOLUS OF ANKLE WITH ROUTINE HEALING, LEFT: ICD-10-CM

## 2024-05-09 LAB
AMPHETAMINES UR QL SCN: ABNORMAL
BARBITURATES UR QL SCN: ABNORMAL
BENZODIAZ UR QL SCN: ABNORMAL
BZE UR QL SCN: ABNORMAL
CANNABINOIDS UR QL SCN: ABNORMAL
FENTANYL+NORFENTANYL UR QL SCN: ABNORMAL
METHADONE UR QL SCN: ABNORMAL
OPIATES UR QL SCN: ABNORMAL
OXYCODONE+OXYMORPHONE UR QL SCN: ABNORMAL
PCP UR QL SCN: ABNORMAL

## 2024-05-09 PROCEDURE — L1902 AFO ANKLE GAUNTLET PRE OTS: HCPCS | Performed by: INTERNAL MEDICINE

## 2024-05-09 PROCEDURE — 99024 POSTOP FOLLOW-UP VISIT: CPT | Performed by: INTERNAL MEDICINE

## 2024-05-09 PROCEDURE — 73610 X-RAY EXAM OF ANKLE: CPT | Mod: LT

## 2024-05-09 PROCEDURE — 73610 X-RAY EXAM OF ANKLE: CPT | Mod: LEFT SIDE | Performed by: INTERNAL MEDICINE

## 2024-05-09 NOTE — PROGRESS NOTES
CC:   Chief Complaint   Patient presents with    Left Ankle - Follow-up, Worker's Compensation     Closed fracture of distal lateral malleolus   Repeat xrays today       HPI: Zainab is a 54 y.o. female  presents today for reevaluation for nondisplaced lateral malleolus fracture of the left ankle. She states that she is doing well, no pain currently. Repeat x-rays today.          Review of Systems   GENERAL: Negative for malaise, significant weight loss, fever  MUSCULOSKELETAL: See HPI  NEURO:  Negative for numbness / tingling     Past Medical History  Past Medical History:   Diagnosis Date    Anxiety disorder, unspecified 12/29/2022    Anxiety    Contact with and (suspected) exposure to covid-19 11/29/2022    Suspected COVID-19 virus infection    Fatty (change of) liver, not elsewhere classified 07/23/2021    NAFLD (nonalcoholic fatty liver disease)    Hypoesthesia of skin     Hypesthesia    Personal history of other diseases of the musculoskeletal system and connective tissue     History of arthritis    Personal history of other diseases of the respiratory system 11/17/2020    History of acute bronchitis    Pneumonia, unspecified organism 01/07/2020    Pneumonia       Medication review  Medication Documentation Review Audit       Reviewed by Radha Quiroz PA-C (Physician Assistant) on 05/08/24 at 1159      Medication Order Taking? Sig Documenting Provider Last Dose Status   albuterol 90 mcg/actuation inhaler [id number] No Inhale 1 puff 3 times a day. Radha Quiroz PA-C Taking Active   ALPRAZolam (Xanax) 0.25 mg tablet [id number] No Take 1 tablet (0.25 mg) by mouth as needed at bedtime for anxiety. Historical Provider, MD Taking Active   ascorbic acid (Vitamin C) 500 mg tablet [id number] No Take 1 tablet (500 mg) by mouth once daily. Historical Provider, MD Taking Active   budesonide-formoteroL (Symbicort) 160-4.5 mcg/actuation inhaler [id number] No Inhale 2 puffs every 12 hours. Radha Quiroz PA-C  Taking Active   cetirizine (ZyrTEC) 10 mg tablet [id number] No Take 1 tablet (10 mg) by mouth once daily. Historical Provider, MD Taking Active   cholecalciferol (Vitamin D-3) 5,000 Units tablet [id number] No Take by mouth once daily. Historical Provider, MD Not Taking Active   cyanocobalamin (Vitamin B-12) 1,000 mcg tablet [id number] No Take 1 tablet (1,000 mcg) by mouth once daily. Historical Provider, MD Taking Active   cyclobenzaprine (Flexeril) 10 mg tablet [id number] No Take 1 tablet (10 mg) by mouth as needed at bedtime for muscle spasms. Radha Quiroz PA-C Taking Active   fish oil concentrate (Omega-3) 120-180 mg capsule [id number] No Take 1 capsule (1 g) by mouth once daily. Historical Provider, MD Taking Active   loratadine 10 mg capsule [id number] No Take by mouth. Historical Provider, MD Not Taking Active   magnesium oxide (Mag-Ox) 400 mg (241.3 mg magnesium) tablet [id number] No Take 1 tablet (400 mg) by mouth 2 times a day. Historical Provider, MD Taking Active   multivitamin tablet [id number] No Take 1 tablet by mouth once daily. Historical Provider, MD Taking Active   naproxen (Naprosyn) 500 mg tablet [id number]  Take 1 tablet (500 mg) by mouth every 12 hours. Radha Quiroz PA-C  Active   phentermine (Adipex-P) 37.5 mg tablet [id number]  Take 1 tablet (37.5 mg) by mouth once daily in the morning. Take before meals. Radha Quiroz PA-C  Active   zinc gluconate 50 mg tablet [id number] No Take 1 tablet (50 mg) by mouth once daily. Historical Provider, MD Not Taking Active                    Allergies  Allergies   Allergen Reactions    Other Unknown       Social History  Social History     Socioeconomic History    Marital status:      Spouse name: Not on file    Number of children: Not on file    Years of education: Not on file    Highest education level: Not on file   Occupational History    Not on file   Tobacco Use    Smoking status: Some Days     Current packs/day: 1.00     Average packs/day: 1  pack/day for 25.0 years (25.0 ttl pk-yrs)     Types: Cigarettes    Smokeless tobacco: Never   Substance and Sexual Activity    Alcohol use: Yes     Comment: occ    Drug use: Never    Sexual activity: Not on file   Other Topics Concern    Not on file   Social History Narrative    Not on file     Social Determinants of Health     Financial Resource Strain: Not on file   Food Insecurity: Not on file   Transportation Needs: Not on file   Physical Activity: Not on file   Stress: Not on file   Social Connections: Not on file   Intimate Partner Violence: Not on file   Housing Stability: Not on file       Surgical History  Past Surgical History:   Procedure Laterality Date    HYSTEROSCOPY  02/15/2016    Hysteroscopy With Endometrial Ablation    OTHER SURGICAL HISTORY  02/18/2020    Endometrial ablation    TUBAL LIGATION  02/15/2016    Tubal Ligation       Physical Exam:  GENERAL:  Patient is awake, alert, and oriented to person place and time.  Patient appears well nourished and well kept.  Affect Calm, Not Acutely Distressed.  HEENT:  Normocephalic, Atraumatic, EOMI  CARDIOVASCULAR:  Hemodynamically stable.  RESPIRATORY:  Normal respirations with unlabored breathing.  Extremity: Left ankle shows skin is intact.  There is no erythema or warmth.  Resolved swelling localized on the lateral aspect.  There is no clinical signs of infection.  There is no pain over the lateral malleolus.  There is no pain of the medial malleolus.  There is no pain over the ATF, CF or PTF ligament.  There is no pain over the deltoid ligament.  No pain over the Achilles tendon.  Negative Nunez's test.  Negative squeeze test.  Negative anterior drawer test.  Negative talar tilt test.  No pain over the anterior process of the talus.  There is no pain over the talar dome.  There is no pain at the base of the fifth metatarsal bone.  No pain of the calcaneus.  No pain over the plantar aponeurosis.  No pain of the midfoot.  Neurovascularly intact.        Diagnostics: X-rays reviewed        Procedure: None    Assessment:  Nondisplaced lateral malleolus fracture of the left ankle     Plan: Zainab presents today for reevaluation for a nondisplaced lateral malleolus fracture. She is clinically doing well, no pain. X-rays showed satisfactory healing fracture. We placed her into a lace up ankle brace. She will follow-up in three weeks, repeat x-rays of the left ankle, 3 views, AP, lateral, oblique views. If doing well, she will start physical therapy as scheduled.    Orders Placed This Encounter    XR ankle left 3+ views      At the conclusion of the visit there were no further questions by the patient/family regarding their plan of care.  Patient was instructed to call or return with any issues, questions, or concerns regarding their injury and/or treatment plan described above.     05/09/24 at 9:54 AM - Tressa Guzman MD  Scribe Attestation  By signing my name below, I, David Fordmo, Scribe   attest that this documentation has been prepared under the direction and in the presence of Tressa Guzman MD.    Office: [phone]    This note was prepared using voice recognition software.  The details of this note are correct and have been reviewed, and corrected to the best of my ability.  Some grammatical errors may persist related to the Dragon software.

## 2024-05-13 ENCOUNTER — APPOINTMENT (OUTPATIENT)
Dept: PRIMARY CARE | Facility: CLINIC | Age: 54
End: 2024-05-13
Payer: COMMERCIAL

## 2024-05-13 LAB
AMPHET UR-MCNC: <50 NG/ML
MDA UR-MCNC: <200 NG/ML
MDEA UR-MCNC: <200 NG/ML
MDMA UR-MCNC: <200 NG/ML
METHAMPHET UR-MCNC: <200 NG/ML
PHENTERMINE UR CFM-MCNC: >5000 NG/ML

## 2024-05-31 ENCOUNTER — OFFICE VISIT (OUTPATIENT)
Dept: ORTHOPEDIC SURGERY | Facility: CLINIC | Age: 54
End: 2024-05-31
Payer: COMMERCIAL

## 2024-05-31 ENCOUNTER — APPOINTMENT (OUTPATIENT)
Dept: ORTHOPEDIC SURGERY | Facility: CLINIC | Age: 54
End: 2024-05-31
Payer: COMMERCIAL

## 2024-05-31 ENCOUNTER — HOSPITAL ENCOUNTER (OUTPATIENT)
Dept: RADIOLOGY | Facility: CLINIC | Age: 54
Discharge: HOME | End: 2024-05-31
Payer: COMMERCIAL

## 2024-05-31 DIAGNOSIS — S92.354A NONDISPLACED FRACTURE OF FIFTH METATARSAL BONE, RIGHT FOOT, INITIAL ENCOUNTER FOR CLOSED FRACTURE: ICD-10-CM

## 2024-05-31 PROCEDURE — 3008F BODY MASS INDEX DOCD: CPT | Performed by: INTERNAL MEDICINE

## 2024-05-31 PROCEDURE — 73610 X-RAY EXAM OF ANKLE: CPT | Mod: LT

## 2024-05-31 PROCEDURE — 73610 X-RAY EXAM OF ANKLE: CPT | Mod: LEFT SIDE | Performed by: INTERNAL MEDICINE

## 2024-05-31 PROCEDURE — 99024 POSTOP FOLLOW-UP VISIT: CPT | Performed by: INTERNAL MEDICINE

## 2024-05-31 NOTE — PROGRESS NOTES
CC:   No chief complaint on file.      HPI: Zainab is a 54 y.o. female  presents today for reevaluation for nondisplaced lateral malleolus fracture of the left ankle. She states that she is doing well, no pain currently. She states that she will start physical therapy next week. Repeat x-rays today.         Review of Systems   GENERAL: Negative for malaise, significant weight loss, fever  MUSCULOSKELETAL: See HPI  NEURO:  Negative for numbness / tingling     Past Medical History  Past Medical History:   Diagnosis Date    Anxiety disorder, unspecified 12/29/2022    Anxiety    Contact with and (suspected) exposure to covid-19 11/29/2022    Suspected COVID-19 virus infection    Fatty (change of) liver, not elsewhere classified 07/23/2021    NAFLD (nonalcoholic fatty liver disease)    Hypoesthesia of skin     Hypesthesia    Personal history of other diseases of the musculoskeletal system and connective tissue     History of arthritis    Personal history of other diseases of the respiratory system 11/17/2020    History of acute bronchitis    Pneumonia, unspecified organism 01/07/2020    Pneumonia       Medication review  Medication Documentation Review Audit       Reviewed by Radha Quiroz PA-C (Physician Assistant) on 05/08/24 at 1159      Medication Order Taking? Sig Documenting Provider Last Dose Status   albuterol 90 mcg/actuation inhaler [id number] No Inhale 1 puff 3 times a day. Radha Quiroz PA-C Taking Active   ALPRAZolam (Xanax) 0.25 mg tablet [id number] No Take 1 tablet (0.25 mg) by mouth as needed at bedtime for anxiety. Historical Provider, MD Taking Active   ascorbic acid (Vitamin C) 500 mg tablet [id number] No Take 1 tablet (500 mg) by mouth once daily. Historical Provider, MD Taking Active   budesonide-formoteroL (Symbicort) 160-4.5 mcg/actuation inhaler [id number] No Inhale 2 puffs every 12 hours. Radha Quiroz PA-C Taking Active   cetirizine (ZyrTEC) 10 mg tablet [id number] No Take 1  tablet (10 mg) by mouth once daily. Historical Provider, MD Taking Active   cholecalciferol (Vitamin D-3) 5,000 Units tablet [id number] No Take by mouth once daily. Historical MD Andrews Not Taking Active   cyanocobalamin (Vitamin B-12) 1,000 mcg tablet [id number] No Take 1 tablet (1,000 mcg) by mouth once daily. Fitz Sparrow MD Taking Active   cyclobenzaprine (Flexeril) 10 mg tablet [id number] No Take 1 tablet (10 mg) by mouth as needed at bedtime for muscle spasms. Radha Quiroz PA-C Taking Active   fish oil concentrate (Omega-3) 120-180 mg capsule [id number] No Take 1 capsule (1 g) by mouth once daily. Historical Provider, MD Taking Active   loratadine 10 mg capsule [id number] No Take by mouth. Fitz Sparrow MD Not Taking Active   magnesium oxide (Mag-Ox) 400 mg (241.3 mg magnesium) tablet [id number] No Take 1 tablet (400 mg) by mouth 2 times a day. Historical Provider, MD Taking Active   multivitamin tablet [id number] No Take 1 tablet by mouth once daily. Historical Provider, MD Taking Active   naproxen (Naprosyn) 500 mg tablet [id number]  Take 1 tablet (500 mg) by mouth every 12 hours. Radha Quiroz PA-C  Active   phentermine (Adipex-P) 37.5 mg tablet [id number]  Take 1 tablet (37.5 mg) by mouth once daily in the morning. Take before meals. Radha Quiroz PA-C  Active   zinc gluconate 50 mg tablet [id number] No Take 1 tablet (50 mg) by mouth once daily. Historical Provider, MD Not Taking Active                    Allergies  Allergies   Allergen Reactions    Other Unknown       Social History  Social History     Socioeconomic History    Marital status:      Spouse name: Not on file    Number of children: Not on file    Years of education: Not on file    Highest education level: Not on file   Occupational History    Not on file   Tobacco Use    Smoking status: Some Days     Current packs/day: 1.00     Average packs/day: 1 pack/day for 25.0 years (25.0 ttl pk-yrs)     Types: Cigarettes     Smokeless tobacco: Never   Substance and Sexual Activity    Alcohol use: Yes     Comment: occ    Drug use: Never    Sexual activity: Not on file   Other Topics Concern    Not on file   Social History Narrative    Not on file     Social Determinants of Health     Financial Resource Strain: Not on file   Food Insecurity: Not on file   Transportation Needs: Not on file   Physical Activity: Not on file   Stress: Not on file   Social Connections: Not on file   Intimate Partner Violence: Not on file   Housing Stability: Not on file       Surgical History  Past Surgical History:   Procedure Laterality Date    HYSTEROSCOPY  02/15/2016    Hysteroscopy With Endometrial Ablation    OTHER SURGICAL HISTORY  02/18/2020    Endometrial ablation    TUBAL LIGATION  02/15/2016    Tubal Ligation       Physical Exam:  GENERAL:  Patient is awake, alert, and oriented to person place and time.  Patient appears well nourished and well kept.  Affect Calm, Not Acutely Distressed.  HEENT:  Normocephalic, Atraumatic, EOMI  CARDIOVASCULAR:  Hemodynamically stable.  RESPIRATORY:  Normal respirations with unlabored breathing.  Extremity:  Left ankle shows skin is intact.  There is no erythema or warmth.  Resolved swelling localized on the lateral aspect.  There is no clinical signs of infection.  There is no pain over the lateral malleolus.  There is no pain of the medial malleolus.  There is no pain over the ATF, CF or PTF ligament.  There is no pain over the deltoid ligament.  No pain over the Achilles tendon.  Negative Nunez's test.  Negative squeeze test.  Negative anterior drawer test.  Negative talar tilt test.  No pain over the anterior process of the talus.  There is no pain over the talar dome.  There is no pain at the base of the fifth metatarsal bone.  No pain of the calcaneus.  No pain over the plantar aponeurosis.  No pain of the midfoot.  Neurovascularly intact.       Diagnostics: X-rays reviewed   XR ankle left 3+  views  Interpreted By:  Tressa Esparza,   STUDY:  XR ANKLE LEFT 3+ VIEWS, 5/9/2024 9:54 am      INDICATION:  Signs/Symptoms:PAIN      ACCESSION NUMBER(S):  [id number]      ORDERING CLINICIAN:  TRESSA ESPARZA      FINDINGS:  Left ankle x-rays three views AP, lateral and oblique view: Stable  appearing and satisfactory healing nondisplaced lateral malleolus  fracture, showing signs of interval healing with increased callus  formation. Mortise intact.          Signed by: Tressa Esparza 5/9/2024 12:59 PM  Dictation workstation:   [id number]        Procedure: None    Assessment: Nondisplaced lateral malleolus fracture of the left ankle     Plan: Zainba presents today for reevaluation for a nondisplaced lateral malleolus fracture. She is clinically doing well, no pain. X-rays showed satisfactory healing fracture. She will wear the lace up ankle brace as needed for the next three weeks. She will start physical therapy next week. She will follow-up as needed.  If there is any increased pain she will return to the office for further imaging.    No orders of the defined types were placed in this encounter.     At the conclusion of the visit there were no further questions by the patient/family regarding their plan of care.  Patient was instructed to call or return with any issues, questions, or concerns regarding their injury and/or treatment plan described above.     05/31/24 at 8:18 AM - Tressa Esparza MD  Scribe Attestation  By signing my name below, I, David Silke, Scribsoledad   attest that this documentation has been prepared under the direction and in the presence of Tressa Esparza MD.    Office: [phone]    This note was prepared using voice recognition software.  The details of this note are correct and have been reviewed, and corrected to the best of my ability.  Some grammatical errors may persist related to the Dragon software.

## 2024-06-05 ENCOUNTER — APPOINTMENT (OUTPATIENT)
Dept: PRIMARY CARE | Facility: CLINIC | Age: 54
End: 2024-06-05
Payer: COMMERCIAL

## 2024-06-05 ENCOUNTER — OFFICE VISIT (OUTPATIENT)
Dept: PRIMARY CARE | Facility: CLINIC | Age: 54
End: 2024-06-05
Payer: COMMERCIAL

## 2024-06-05 ENCOUNTER — NURSE TRIAGE (OUTPATIENT)
Dept: PRIMARY CARE | Facility: CLINIC | Age: 54
End: 2024-06-05

## 2024-06-05 VITALS
OXYGEN SATURATION: 95 % | SYSTOLIC BLOOD PRESSURE: 125 MMHG | TEMPERATURE: 97.9 F | BODY MASS INDEX: 31.54 KG/M2 | DIASTOLIC BLOOD PRESSURE: 82 MMHG | HEIGHT: 63 IN | HEART RATE: 84 BPM | WEIGHT: 178 LBS

## 2024-06-05 DIAGNOSIS — J30.1 SEASONAL ALLERGIC RHINITIS DUE TO POLLEN: ICD-10-CM

## 2024-06-05 DIAGNOSIS — J30.1 SEASONAL ALLERGIC RHINITIS DUE TO POLLEN: Primary | ICD-10-CM

## 2024-06-05 DIAGNOSIS — J45.20 MILD INTERMITTENT ASTHMA WITHOUT COMPLICATION (HHS-HCC): ICD-10-CM

## 2024-06-05 DIAGNOSIS — E66.09 CLASS 1 OBESITY DUE TO EXCESS CALORIES WITHOUT SERIOUS COMORBIDITY WITH BODY MASS INDEX (BMI) OF 32.0 TO 32.9 IN ADULT: ICD-10-CM

## 2024-06-05 PROCEDURE — 3008F BODY MASS INDEX DOCD: CPT | Performed by: PHYSICIAN ASSISTANT

## 2024-06-05 PROCEDURE — 99213 OFFICE O/P EST LOW 20 MIN: CPT | Performed by: PHYSICIAN ASSISTANT

## 2024-06-05 RX ORDER — CETIRIZINE HYDROCHLORIDE 10 MG/1
10 TABLET ORAL DAILY
Qty: 90 TABLET | Refills: 0 | OUTPATIENT
Start: 2024-06-05

## 2024-06-05 RX ORDER — PHENTERMINE HYDROCHLORIDE 37.5 MG/1
37.5 TABLET ORAL
Qty: 30 TABLET | Refills: 0 | Status: SHIPPED | OUTPATIENT
Start: 2024-06-05 | End: 2024-07-05

## 2024-06-05 NOTE — PROGRESS NOTES
Subjective   Patient ID: Zainab Lagunas is a 54 y.o. female who presents for Follow-up.    HPI     Here for adipex # 2 mo   Lost  #7lbs with diet changes  No recent BW  DEXA: 2024  Mammo 2/24  Colonoscopy: 2022 5/8/24 last fill of Adipex  Goal weight is 160#  Energy level is good, no insomnia  + dry mouth, no constipation    OARRS:  No data recorded  I have personally reviewed the OARRS report for Zainab Lagunas. I have considered the risks of abuse, dependence, addiction and diversion and I believe that it is clinically appropriate for Zainab Lagunas to be prescribed this medication    Is the patient prescribed a combination of a benzodiazepine and opioid?  Yes, I feel it is clincially indicated to continue the medication and have discussed with the patient risks/benefits/alternatives.    Last Urine Drug Screen / ordered today: Yes  Recent Results (from the past 8760 hour(s))   Amphetamine Confirm, Urine    Collection Time: 05/08/24  9:44 AM   Result Value Ref Range    Methamphetamine Quant, Ur <200 ng/mL    MDA, Urine <200 ng/mL    MDEA, Urine <200 ng/mL    Phentermine,Urine >5000 ng/mL    Amphetamines,Urine <50 ng/mL    MDMA, Urine <200 ng/mL   Drug Screen, Urine With Reflex to Confirmation    Collection Time: 05/08/24  9:44 AM   Result Value Ref Range    Amphetamine Screen, Urine Presumptive Positive (A) Presumptive Negative    Barbiturate Screen, Urine Presumptive Negative Presumptive Negative    Benzodiazepines Screen, Urine Presumptive Negative Presumptive Negative    Cannabinoid Screen, Urine Presumptive Negative Presumptive Negative    Cocaine Metabolite Screen, Urine Presumptive Negative Presumptive Negative    Fentanyl Screen, Urine Presumptive Negative Presumptive Negative    Opiate Screen, Urine Presumptive Negative Presumptive Negative    Oxycodone Screen, Urine Presumptive Negative Presumptive Negative    PCP Screen, Urine Presumptive Negative Presumptive Negative    Methadone Screen,  Urine Presumptive Negative Presumptive Negative     Results are as expected.         Controlled Substance Agreement:  Date of the Last Agreement: 5/8/24  Reviewed Controlled Substance Agreement including but not limited to the benefits, risks, and alternatives to treatment with a Controlled Substance medication(s).    Anorexiants:   What is the patient's goal of therapy? weight  Is this being achieved with current treatment? Yes, 7# loss in 4 wk    I have assessed the patient's continuing efforts to lose weight., I have assessed the patient's dedication to the treatment program and the response to treatment., and I have assessed the presence or absence of contraindications, adverse effects, and indicators of possible substance abuse that would necessitate cessation of treatment utilizing controlled substance.    Patient has demonstrated continued efforts to lose weight, is dedicated to the treatment program and the response to treatment. and I have assessed for the presence or absence of contraindications, adverse effects, and indicators of possible substance abuse that would necessitate cessation of treatment utilizing controlled substance.    Activities of Daily Living:   Is your overall impression that this patient is benefiting (symptom reduction outweighs side effects) from anorexiants therapy? Yes     1. Physical Functioning: Better  2. Family Relationship: Better  3. Social Relationship: Better  4. Mood: Better  5. Sleep Patterns: Better  6. Overall Function: Better      Review of Systems  Constitutional: Patient denies any fever, chills, loss of appetite, or unexplained weight loss.  Cardiovascular: Patient denies any chest pain, shortness of breath with exertion, tachycardia, palpitations, orthopnea, or paroxysmal nocturnal dyspnea.  Respiratory: Patient denies any cough, shortness breath, or wheezing.  Gastrointestinal patient denies any nausea, vomiting, diarrhea, constipation, melena, hematochezia, or  "reflux symptoms  Skin: Denies any rashes or skin lesions   Neurology: Patient denies any new motor or sensory losses.  Denies any numbness, tingling, weakness, and incoordination of the extremities.  Patient also denies any tremor, seizures, or gait instability.  Endocrinology: Denies any polyuria, polydipsia, polyphagia, or heat/cold intolerance.    Objective   /82   Pulse 84   Temp 36.6 °C (97.9 °F)   Ht 1.6 m (5' 3\")   Wt 80.7 kg (178 lb)   SpO2 95%   BMI 31.53 kg/m²     Physical Exam  Gen. appearance: Alert and cooperative, no acute distress, well-developed, well-nourished obese female.  Neck: Supple and without adenopathy or rigidity.  There is no JVD at 90° and no carotid bruits are noted.  Cardiovascular: Heart has a regular rate and rhythm without murmur or ectopy.  Respiratory: Lungs are clear to auscultation bilaterally with good air exchange.    Assessment/Plan   Diagnoses and all orders for this visit:  Class 1 obesity due to excess calories without serious comorbidity with body mass index (BMI) of 32.0 to 32.9 in adult  -     Follow Up In Primary Care - Established  -     phentermine (Adipex-P) 37.5 mg tablet; Take 1 tablet (37.5 mg) by mouth once daily in the morning. Take before meals.  -     Follow Up In Advanced [facility] Care - PCP - Established; Future  Patient just finished her first month of Adipex and is now about to start her second month.  Prescription was sent to the pharmacy.  She is to follow-up in 4 weeks.    We discussed the risks/benefits/side effects of Adipex at length again. She/He understands similar medications have had adverse effects on heart valves in the past and there is no guarantee that Adipex cannot have similar side effects. We discussed that the medication is potentially abusable and potentially addictive. We again discussed that she/he can only continue on the medication for 3 months before needing a 6 month drug-free interval. We discussed that we will need to " discontinue the medication should she/he fail to lose weight.  I see no signs of substance abuse today    Patient is to return to clinic in 4 weeks.    Patient understands that should they have testing outside   facilities that we may not receive the results and was told to call us if they have not heard from our office within a week after testing.    [facility] uses voice recognition technology for dictations. Sometimes the software misinterprets words. Please take this into account when reading this.

## 2024-06-05 NOTE — TELEPHONE ENCOUNTER
----- Message from Zainab Lagunas sent at 6/5/2024  5:01 PM EDT -----  Regarding: Photos of the medication your requested  Contact: [phone]  Here are the meds I am taking and need refills on.     Thank you and have a great day Radha.

## 2024-06-06 NOTE — TELEPHONE ENCOUNTER
SPOKE TO PT CHELSEA WAS ADVISED BY CHELSEA TO SEND PICTURE OF WHAT SHE WAS TAKING FOR SINUS CONGESTION SO SHE CAN SEND REFILLS.

## 2024-06-10 RX ORDER — CETIRIZINE HYDROCHLORIDE 10 MG/1
10 TABLET ORAL DAILY
Qty: 30 TABLET | Refills: 5 | Status: SHIPPED | OUTPATIENT
Start: 2024-06-10 | End: 2024-12-07

## 2024-06-11 PROBLEM — J30.1 SEASONAL ALLERGIC RHINITIS DUE TO POLLEN: Status: ACTIVE | Noted: 2023-05-01

## 2024-06-11 RX ORDER — FLUTICASONE PROPIONATE 50 MCG
1 SPRAY, SUSPENSION (ML) NASAL DAILY
Qty: 16 G | Refills: 2 | Status: SHIPPED | OUTPATIENT
Start: 2024-06-11

## 2024-06-11 RX ORDER — BROMPHENIRAMINE MALEATE, PSEUDOEPHEDRINE HYDROCHLORIDE, AND DEXTROMETHORPHAN HYDROBROMIDE 2; 30; 10 MG/5ML; MG/5ML; MG/5ML
10 SYRUP ORAL EVERY 6 HOURS PRN
Qty: 200 ML | Refills: 0 | Status: SHIPPED | OUTPATIENT
Start: 2024-06-11 | End: 2024-06-21

## 2024-06-20 ENCOUNTER — APPOINTMENT (OUTPATIENT)
Dept: PHYSICAL THERAPY | Facility: CLINIC | Age: 54
End: 2024-06-20
Payer: COMMERCIAL

## 2024-06-24 ENCOUNTER — OFFICE VISIT (OUTPATIENT)
Dept: PRIMARY CARE | Facility: CLINIC | Age: 54
End: 2024-06-24
Payer: COMMERCIAL

## 2024-06-24 ENCOUNTER — EVALUATION (OUTPATIENT)
Dept: PHYSICAL THERAPY | Facility: CLINIC | Age: 54
End: 2024-06-24
Payer: COMMERCIAL

## 2024-06-24 VITALS
HEART RATE: 97 BPM | SYSTOLIC BLOOD PRESSURE: 104 MMHG | BODY MASS INDEX: 32.37 KG/M2 | WEIGHT: 182.7 LBS | DIASTOLIC BLOOD PRESSURE: 72 MMHG | HEIGHT: 63 IN | TEMPERATURE: 97.7 F | OXYGEN SATURATION: 95 %

## 2024-06-24 DIAGNOSIS — L23.7 POISON IVY DERMATITIS: Primary | ICD-10-CM

## 2024-06-24 DIAGNOSIS — S82.62XD CLOSED FRACTURE OF DISTAL LATERAL MALLEOLUS OF ANKLE WITH ROUTINE HEALING, LEFT: ICD-10-CM

## 2024-06-24 DIAGNOSIS — S82.65XD CLOSED NONDISPLACED FRACTURE OF LATERAL MALLEOLUS OF LEFT FIBULA WITH ROUTINE HEALING: Primary | ICD-10-CM

## 2024-06-24 PROCEDURE — 96372 THER/PROPH/DIAG INJ SC/IM: CPT | Performed by: PHYSICIAN ASSISTANT

## 2024-06-24 PROCEDURE — 97162 PT EVAL MOD COMPLEX 30 MIN: CPT | Mod: GP | Performed by: GENERAL ACUTE CARE HOSPITAL

## 2024-06-24 PROCEDURE — 97530 THERAPEUTIC ACTIVITIES: CPT | Mod: GP | Performed by: GENERAL ACUTE CARE HOSPITAL

## 2024-06-24 PROCEDURE — 3008F BODY MASS INDEX DOCD: CPT | Performed by: PHYSICIAN ASSISTANT

## 2024-06-24 PROCEDURE — 99213 OFFICE O/P EST LOW 20 MIN: CPT | Performed by: PHYSICIAN ASSISTANT

## 2024-06-24 RX ORDER — METHYLPREDNISOLONE ACETATE 40 MG/ML
40 INJECTION, SUSPENSION INTRA-ARTICULAR; INTRALESIONAL; INTRAMUSCULAR; SOFT TISSUE ONCE
Status: COMPLETED | OUTPATIENT
Start: 2024-06-24 | End: 2024-06-24

## 2024-06-24 RX ORDER — METHYLPREDNISOLONE 4 MG/1
TABLET ORAL
Qty: 21 TABLET | Refills: 0 | Status: SHIPPED | OUTPATIENT
Start: 2024-06-24 | End: 2024-07-01

## 2024-06-24 RX ORDER — TRIAMCINOLONE ACETONIDE 1 MG/G
CREAM TOPICAL 2 TIMES DAILY
Qty: 30 G | Refills: 1 | Status: SHIPPED | OUTPATIENT
Start: 2024-06-24

## 2024-06-24 ASSESSMENT — PATIENT HEALTH QUESTIONNAIRE - PHQ9
2. FEELING DOWN, DEPRESSED OR HOPELESS: NOT AT ALL
SUM OF ALL RESPONSES TO PHQ9 QUESTIONS 1 AND 2: 0
1. LITTLE INTEREST OR PLEASURE IN DOING THINGS: NOT AT ALL

## 2024-06-24 NOTE — LETTER
June 24, 2024    Miguel Boswell PT  [address] [facility]   Rehab Services, [address] [address]    Patient: Zainab Lagunas   YOB: 1970   Date of Visit: 6/24/2024       Dear Tressa Guzman MD  [address]   [facility], [address] [place] [address],  [place] [address]    The attached plan of care is being sent to you because your patient’s medical reimbursement requires that you certify the plan of care. Your signature is required to allow uninterrupted insurance coverage.      You may indicate your approval by signing below and faxing this form back to us at Dept Fax: [phone].    Please call Dept: [phone] with any questions or concerns.    Thank you for this referral,        Miguel Boswell PT  [address] [place] [address]    Payer: Payor: [employer] / Plan: [employer] / Product Type: *No Product type* /                                                                         Date:     Dear Miguel Boswell, PT,     Re: Ms. Zainab Lagunas, MRN:[id number]    I certify that I have reviewed the attached plan of care and it is medically necessary for Ms. Zainab Lagunas (1970) who is under my care.          ______________________________________                    _________________  Provider name and credentials                                           Date and time                                                                                           Plan of Care 6/24/24   Effective from: 6/24/2024  Effective to: 9/22/2024    Plan ID: [id number]            Participants as of Finalize on 6/24/2024    Name Type Comments Contact Info    Tressa Guzman MD Referring Provider  [phone]    Miguel Boswell PT Physical Therapist  [phone]       Last Plan Note     Author: Miguel Boswell PT Status: Incomplete Last edited: 6/24/2024  5:00 PM       Patient Name:  Zainab Lagunas  MRN: [id number]  Today's Date: 6/24/2024     Current Problem:  1. Closed nondisplaced fracture of lateral malleolus of left fibula with routine healing            Visit 1/12   [facility] approval (4/19-7/19/24)    Primary Complaint/ Functional Limitation: running ride a bike and exercise unable to do because of fear of breaking. Fractured right ankle x2 left ankle x1   Prior level of function: independent   Date of onset: 4/16/24  Cause: helping cleaning a desk and twisted on the right foot stepping down on the left   Pain Location: left ankle   Current Pain: 0/10  Worst Pain: 0/10  Description of pain: pulsating when driving   Aggravating Activities:  driving   Relieving Activities: rest   Work Requirements/ Hobbies:  Prior Treatment and results of Prior treatment:  Constitutional Symptoms: Patient Denies   Fever Chills Nausea Vomiting Loss of appetite Abdominal pain Change in bowel function Weight loss or gain Headache Unexplained fatigue Malaise Lethargy Weakness Arthralgia  Myalgia  Difficulty in sleeping Breathing trouble  Barriers to treatment/ learning: none     Gait: slight antalgic left   Observation: edema left lateral ankle  Palpation: no tenderness   Myotomes: WNL  Dermatomes: WNL  Deep Tendon Reflexes       Patella 2+/2+       Achilles 2+/2+    AROM (degrees)  Ankle Plantarflexion R/L: 58/ 56  Ankle Dorsiflexion R/L: 24/ 12  Ankle Inversion R/L: 38/ 42  Ankle Eversion R/L: 24/ 14    Strength:  Ankle Plantarflexion R/L: 5/5,  4/5  Ankle Dorsiflexion R/L: 4/5 4/5  Ankle Inversion R/L: 5/ 5, 4/5  Ankle Eversion R/L: 5/5,  4 /5  Knee Extension R/L:  5/ 5 /5  Knee Flexion R/L: 5/5  5/5    Hip Flexion R/L: 5 /5,  5/5  Hip Extension R/L: 5 /5,  5/5  Hip Abduction R/L: 5 /5,  5/5  Hip Adduction R/L:  5/5,  5/5    Flexibility (R/L):     Hamstring-/ -     GABBIE-/ -     FADIR-/ -     Gastroc-/ +     Soleus-/ +     OBERS-/ -     Mod Melquiades-/ -    Joint Mobility:   Talocrural R/L: nt/ hypo   Subtalar  R/L: nt/ hypo  Special Tests   Ant drawer R/L: -/- Talar tilt R/L: -/-      LEFS: 64/80    Evaluation, Tests and measures, Education including HEP instruction and feedback. Patient appears to be compliant and motivated to perform HEP as instructed and participate in active physical therapy as indicated. The patient was educated on: the importance of positioning, proper posture, and body mechanics, joint mechanics and pathology, general tissue healing time, the appropriate use of heat and cold to control pain and inflammation, the importance of general therapeutic exercise, especially to stay within pain-free ROM, specific anatomy, function, & regional interdependence of involved areas, & likely cause of impairments & POC.  Patient's questions were answered to their satisfaction, & patient verbalized understanding & agreement with POC.  The patient presents to Physical Therapy with signs and symptoms consistent with the medical diagnosis of lateral malleolus fracture . Key impairments include:  decreased strength stability ROM and difficulty with functional activities such as walking biking. Skilled PT is required to address these key impairments and to provide and progress with an appropriate home exercise program. This evaluation is of  moderate complexity due to the nature of the patient's presentation as well as the comorbidities and medical factors included in this evaluation.  Treatment may include: Therapeutic Exercise (PROM, AA/AROM, Flexibility, Strengthening, Stabilization, HEP instruction). Therapeutic Activities (Transfers, Body Mechanics, Work Related Activities, Closed Chain, Agility and Power).   Manual Techniques (Soft tissue Mobilization, Joint mobilization/Distraction, Muscle Energy Techniques, Lymphatic Drainage, Dry Needling).  Neuromuscular Reeducation (Postural Training, Balance/Proprioception, Relaxation Techniques). Biofeedback. Aquatic Exercise. Modalities: Ultrasound, Moist Heat,  Cryotherapy, Vasopneumatic with or without Cryotherapy. Electrical Stimulation (TENS/IFC/ Pre modulated for pain relief, NMES for Muscle reeducation). Gait Training. Orthotic Fit and Training. Strapping, Kinesio taping.   Mod eval : 20 minutes  Therex 15 min   Patient will report resting pain on Visual Analog Scale < or = 0 .   Pain at worst will be < or = 0 .   Pain with (patients primary complaint) will be < or = 0 .    Lower Extremity Functional Scale (LEFS) score will improve >/= 9 points to demonstrate overall improved functional abilities.    Ankle AROM will be >/=   20 deg Dorsiflexion,  40 deg Plantarflexion,   30 deg Inversion,  20 deg Eversion to demonstrate improved joint mechanics with functional activities.    Ankle Strength will be >/=   5/5 Dorsiflexion,  5/5  Plantarflexion,   5/5  Inversion,   5/5 Eversion to demonstrate improved joint stability with functional activities.    Patient will demonstrate minimal or no gait deviation with ambulating >/= 30 minutes, all surfaces, up/down stairs reciprocally.    Patient will perform single leg stance (SLS) >/= 10 seconds to demonstrate improve balance and control.    Patient will correctly perform home exercise program independently, demonstrated through patient teach back upon discharge.    Access Code: [email or web address]  URL: [email or web address]  Date: 06/24/2024  Prepared by: Miguel Boswell    Exercises  - Long Sitting Ankle Eversion with Resistance  - 1-2 x daily - 7 x weekly - 20 reps  - Long Sitting Ankle Plantar Flexion with Resistance  - 1-2 x daily - 7 x weekly - 20 reps  - Long Sitting Ankle Inversion with Resistance  - 1-2 x daily - 7 x weekly - 20 reps  - Long Sitting Ankle Dorsiflexion with Anchored Resistance  - 1-2 x daily - 7 x weekly - 20 reps  - Seated Gastroc Stretch with Strap  - 1-2 x daily - 7 x weekly - 2 reps - 30 hold         Current Participants as of 6/24/2024    Name Type Comments Contact Info    Tressa WHITFIELD  MD Megan Referring Provider  [phone]    Signature pending    Miguel Boswell, CHRISTEL Physical Therapist  [phone]    Signature pending

## 2024-06-24 NOTE — PROGRESS NOTES
"Subjective   Patient ID: Zainab Lagunas is a 54 y.o. female who presents for Rash.    HPI     Patient states arm rash that spread throughout the body since last Weds at [place]. She was exposed to poison ivy at [place].      Rash  Patient presents for evaluation of a rash involving the upper extremity. Rash started 6 days ago. Lesions are red, and raised  and blistered in texture. Rash has not changed over time. Rash is painful and is pruritic. Associated symptoms: none. Patient denies: abdominal pain, arthralgia, congestion, cough, fever, and myalgia. Patient has not had contacts with similar rash. Patient has had new exposures (soaps, lotions, laundry detergents, foods, medications, plants, insects or animals).  Using Cortizone 10 gel and it helps with the itching        Review of Systems  Constitutional: Patient denies any fever, chills, loss of appetite, or unexplained weight loss.  Cardiovascular: Patient denies any chest pain, shortness of breath with exertion, tachycardia, palpitations, orthopnea, or paroxysmal nocturnal dyspnea.  Respiratory: Patient denies any cough, shortness breath, or wheezing.  Gastrointestinal patient denies any nausea, vomiting, diarrhea, constipation, melena, hematochezia, or reflux symptoms  Skin: Denies any rashes or skin lesions   Neurology: Patient denies any new motor or sensory losses.  Denies any numbness, tingling, weakness, and incoordination of the extremities.  Patient also denies any tremor, seizures, or gait instability.  Endocrinology: Denies any polyuria, polydipsia, polyphagia, or heat/cold intolerance.    Objective   /72   Pulse 97   Temp 36.5 °C (97.7 °F)   Ht 1.6 m (5' 3\")   Wt 82.9 kg (182 lb 11.2 oz)   SpO2 95%   BMI 32.36 kg/m²     Physical Exam  Skin:            Comments: Erythematous, raised, convalescing blisters       Gen. appearance: Alert and cooperative, no acute distress, well-developed, well-nourished female.  Neck: Supple and without " adenopathy or rigidity.  There is no JVD at 90° and no carotid bruits are noted.  Cardiovascular: Heart has a regular rate and rhythm without murmur or ectopy.  Respiratory: Lungs are clear to auscultation bilaterally with good air exchange.      Assessment/Plan   Diagnoses and all orders for this visit:  Poison ivy dermatitis  -     triamcinolone (Kenalog) 0.1 % cream; Apply topically 2 times a day. Apply to affected area 1-2 times daily as needed. Avoid face and groin.  -     methylPREDNISolone (Medrol Dospak) 4 mg tablets; Take as directed on package.  She is to have 40 mg of Depo-Medrol now.  In 48 hours if symptoms do not improve she will start taking a Medrol Dosepak.  She can apply Kenalog daily to help with itching and return to the clinic if symptoms or not improved within 10 days.    Patient understands that should they have testing outside   facilities that we may not receive the results and was told to call us if they have not heard from our office within a week after testing.    [facility] uses voice recognition technology for dictations. Sometimes the software misinterprets words. Please take this into account when reading this.

## 2024-06-24 NOTE — PROGRESS NOTES
Patient Name: Zainab Lagunas  MRN: [id number]  Today's Date: 6/24/2024     Current Problem:  1. Closed nondisplaced fracture of lateral malleolus of left fibula with routine healing            Visit 1/12   [facility] approval (4/19-7/19/24)    Primary Complaint/ Functional Limitation: running ride a bike and exercise unable to do because of fear of breaking. Fractured right ankle x2 left ankle x1   Prior level of function: independent   Date of onset: 4/16/24  Cause: helping cleaning a desk and twisted on the right foot stepping down on the left   Pain Location: left ankle   Current Pain: 0/10  Worst Pain: 0/10  Description of pain: pulsating when driving   Aggravating Activities:  driving   Relieving Activities: rest   Work Requirements/ Hobbies:  Prior Treatment and results of Prior treatment:  Constitutional Symptoms: Patient Denies   Fever Chills Nausea Vomiting Loss of appetite Abdominal pain Change in bowel function Weight loss or gain Headache Unexplained fatigue Malaise Lethargy Weakness Arthralgia  Myalgia  Difficulty in sleeping Breathing trouble  Barriers to treatment/ learning: none     Gait: slight antalgic left   Observation: edema left lateral ankle  Palpation: no tenderness   Myotomes: WNL  Dermatomes: WNL  Deep Tendon Reflexes       Patella 2+/2+       Achilles 2+/2+    AROM (degrees)  Ankle Plantarflexion R/L: 58/ 56  Ankle Dorsiflexion R/L: 24/ 12  Ankle Inversion R/L: 38/ 42  Ankle Eversion R/L: 24/ 14    Strength:  Ankle Plantarflexion R/L: 5/5,  4/5  Ankle Dorsiflexion R/L: 4/5 4/5  Ankle Inversion R/L: 5/ 5, 4/5  Ankle Eversion R/L: 5/5,  4 /5  Knee Extension R/L:  5/ 5 /5  Knee Flexion R/L: 5/5  5/5    Hip Flexion R/L: 5 /5,  5/5  Hip Extension R/L: 5 /5,  5/5  Hip Abduction R/L: 5 /5,  5/5  Hip Adduction R/L:  5/5,  5/5    Flexibility (R/L):     Hamstring-/ -     GABBIE-/ -     FADIR-/ -     Gastroc-/ +     Soleus-/ +     OBERS-/ -     Mod Melquiades-/ -    Joint Mobility:   Talocrural R/L: nt/  hypo   Subtalar R/L: nt/ hypo  Special Tests   Ant drawer R/L: -/- Talar tilt R/L: -/-      LEFS: 64/80    Evaluation, Tests and measures, Education including HEP instruction and feedback. Patient appears to be compliant and motivated to perform HEP as instructed and participate in active physical therapy as indicated. The patient was educated on: the importance of positioning, proper posture, and body mechanics, joint mechanics and pathology, general tissue healing time, the appropriate use of heat and cold to control pain and inflammation, the importance of general therapeutic exercise, especially to stay within pain-free ROM, specific anatomy, function, & regional interdependence of involved areas, & likely cause of impairments & POC.  Patient's questions were answered to their satisfaction, & patient verbalized understanding & agreement with POC.  The patient presents to Physical Therapy with signs and symptoms consistent with the medical diagnosis of lateral malleolus fracture . Key impairments include:  decreased strength stability ROM and difficulty with functional activities such as walking biking. Skilled PT is required to address these key impairments and to provide and progress with an appropriate home exercise program. This evaluation is of  moderate complexity due to the nature of the patient's presentation as well as the comorbidities and medical factors included in this evaluation.  Treatment may include: Therapeutic Exercise (PROM, AA/AROM, Flexibility, Strengthening, Stabilization, HEP instruction). Therapeutic Activities (Transfers, Body Mechanics, Work Related Activities, Closed Chain, Agility and Power).   Manual Techniques (Soft tissue Mobilization, Joint mobilization/Distraction, Muscle Energy Techniques, Lymphatic Drainage, Dry Needling).  Neuromuscular Reeducation (Postural Training, Balance/Proprioception, Relaxation Techniques). Biofeedback. Aquatic Exercise. Modalities: Ultrasound, Moist  Heat, Cryotherapy, Vasopneumatic with or without Cryotherapy. Electrical Stimulation (TENS/IFC/ Pre modulated for pain relief, NMES for Muscle reeducation). Gait Training. Orthotic Fit and Training. Strapping, Kinesio taping.   Mod eval : 20 minutes  Therex 15 min   Patient will report resting pain on Visual Analog Scale < or = 0 .   Pain at worst will be < or = 0 .   Pain with (patients primary complaint) will be < or = 0 .    Lower Extremity Functional Scale (LEFS) score will improve >/= 9 points to demonstrate overall improved functional abilities.    Ankle AROM will be >/=   20 deg Dorsiflexion,  40 deg Plantarflexion,   30 deg Inversion,  20 deg Eversion to demonstrate improved joint mechanics with functional activities.    Ankle Strength will be >/=   5/5 Dorsiflexion,  5/5  Plantarflexion,   5/5  Inversion,   5/5 Eversion to demonstrate improved joint stability with functional activities.    Patient will demonstrate minimal or no gait deviation with ambulating >/= 30 minutes, all surfaces, up/down stairs reciprocally.    Patient will perform single leg stance (SLS) >/= 10 seconds to demonstrate improve balance and control.    Patient will correctly perform home exercise program independently, demonstrated through patient teach back upon discharge.    Access Code: [email or web address]  URL: [email or web address]  Date: 06/24/2024  Prepared by: Miguel Boswell    Exercises  - Long Sitting Ankle Eversion with Resistance  - 1-2 x daily - 7 x weekly - 20 reps  - Long Sitting Ankle Plantar Flexion with Resistance  - 1-2 x daily - 7 x weekly - 20 reps  - Long Sitting Ankle Inversion with Resistance  - 1-2 x daily - 7 x weekly - 20 reps  - Long Sitting Ankle Dorsiflexion with Anchored Resistance  - 1-2 x daily - 7 x weekly - 20 reps  - Seated Gastroc Stretch with Strap  - 1-2 x daily - 7 x weekly - 2 reps - 30 hold

## 2024-06-25 ENCOUNTER — APPOINTMENT (OUTPATIENT)
Dept: PHYSICAL THERAPY | Facility: CLINIC | Age: 54
End: 2024-06-25
Payer: COMMERCIAL

## 2024-06-27 ENCOUNTER — TREATMENT (OUTPATIENT)
Dept: PHYSICAL THERAPY | Facility: CLINIC | Age: 54
End: 2024-06-27
Payer: COMMERCIAL

## 2024-06-27 DIAGNOSIS — S82.62XD CLOSED FRACTURE OF DISTAL LATERAL MALLEOLUS OF ANKLE WITH ROUTINE HEALING, LEFT: ICD-10-CM

## 2024-06-27 DIAGNOSIS — S82.65XA NONDISPLACED FRACTURE OF LATERAL MALLEOLUS OF LEFT FIBULA: Primary | ICD-10-CM

## 2024-06-27 DIAGNOSIS — S82.65XD CLOSED NONDISPLACED FRACTURE OF LATERAL MALLEOLUS OF LEFT FIBULA WITH ROUTINE HEALING: ICD-10-CM

## 2024-06-27 PROCEDURE — 97110 THERAPEUTIC EXERCISES: CPT | Mod: GP

## 2024-06-27 NOTE — PROGRESS NOTES
Physical Therapy    Physical Therapy Treatment    Patient Name: Zainab Lagunas  MRN: [id number]    Today's Date: 6/27/2024  Time Calculation  Start Time: 1508  Stop Time: 1548  Time Calculation (min): 40 min     PT Therapeutic Procedures Time Entry  Therapeutic Exercise Time Entry: 40                 Visit #2  2024 // [facility] Claim# [id number],   AUTH 12 VISITS, 4/19-7/19/24 //   S82.65XA // Reference# [id number]     Assessment:   Pt tolerated all exercises without onset of pain. Focused on controlled quality reps with today's session which patient showed good follow-through with. Instructed to bring lace-up brace to next visit.         Current Problem  1. Nondisplaced fracture of lateral malleolus of left fibula            General          Subjective    Pt reports HEP is going well. No pain reported today.     Pain   0/10    Objective       Treatments:  Nu-step 8 min Lv2  Ambulation on track 2/10 mile   Ankle DF vs. RTB 2x15  Ankle PF vs. RTB 2x15  Ankle Inv vs. RTB 2x15  Ankle Ever vs. RTB 2x15  Heel raises on airex 2x20  Fwd step-up onto 2 in airex surface x20  Lateral step-up onto 2 in airex surface x20  Slow marches on airex surface x30 ea  Tandem stepping on flat ground 10 laps, 8 tandem steps per lap      OP EDUCATION:   HEP, POC    Goals:  Patient will report resting pain on Visual Analog Scale < or = 0 .   Pain at worst will be < or = 0 .   Pain with (patients primary complaint) will be < or = 0 .     Lower Extremity Functional Scale (LEFS) score will improve >/= 9 points to demonstrate overall improved functional abilities.     Ankle AROM will be >/=   20 deg Dorsiflexion,  40 deg Plantarflexion,   30 deg Inversion,  20 deg Eversion to demonstrate improved joint mechanics with functional activities.     Ankle Strength will be >/=   5/5 Dorsiflexion,  5/5  Plantarflexion,   5/5  Inversion,   5/5 Eversion to demonstrate improved joint stability with functional activities.     Patient will demonstrate minimal or no  gait deviation with ambulating >/= 30 minutes, all surfaces, up/down stairs reciprocally.     Patient will perform single leg stance (SLS) >/= 10 seconds to demonstrate improve balance and control.     Patient will correctly perform home exercise program independently, demonstrated through patient teach back upon discharge.

## 2024-07-02 ENCOUNTER — TREATMENT (OUTPATIENT)
Dept: PHYSICAL THERAPY | Facility: CLINIC | Age: 54
End: 2024-07-02
Payer: COMMERCIAL

## 2024-07-02 DIAGNOSIS — S82.62XD CLOSED FRACTURE OF DISTAL LATERAL MALLEOLUS OF ANKLE WITH ROUTINE HEALING, LEFT: ICD-10-CM

## 2024-07-02 DIAGNOSIS — S82.65XD CLOSED NONDISPLACED FRACTURE OF LATERAL MALLEOLUS OF LEFT FIBULA WITH ROUTINE HEALING: ICD-10-CM

## 2024-07-02 PROCEDURE — 97140 MANUAL THERAPY 1/> REGIONS: CPT | Mod: GP,CQ

## 2024-07-02 PROCEDURE — 97110 THERAPEUTIC EXERCISES: CPT | Mod: GP,CQ

## 2024-07-02 NOTE — PROGRESS NOTES
"Patient Name: Zainab Lagunas  MRN: [id number]  Today's Date: 7/2/2024  Time Calculation  Start Time: 1450  Stop Time: 1530  Time Calculation (min): 40 min  Visit #3  2024 // [facility] Claim# [id number],   AUTH 12 VISITS, 4/19-7/19/24 //   S82.65XA // Reference# [id number]     Subjective:  Reports that she has been having \"Cramping\" dorsal aspect of L foot . Denies pain feels that discomfort is more like achiness     Objective:  AROM L ankle DF 15 degrees     Assessment:   Improved end range noted. Cues for proper ROM and avoidance of toe involvement to complete and initiate range .    Plan:   Progress loading exercises     Treatment :   THERAPEUTIC EXERCISE 27525 15 minutes 1 unit  Nustep 5 minutes level 3  Isometrics all planes 5 seconds x10   Baps board level 2 standing x10 each   MANUAL TECHNIQUES  06156 25 minutes 2 units   Talocrural post glides   Calcaneal distraction   Toe distraction /Grt toe distraction  Passive stretching toes/ankle   STM /TP relase gastroc            Current Problem:  1. Closed nondisplaced fracture of lateral malleolus of left fibula with routine healing  Follow Up In Physical Therapy      2. Closed fracture of distal lateral malleolus of ankle with routine healing, left  Follow Up In Physical Therapy                   "

## 2024-07-05 ENCOUNTER — APPOINTMENT (OUTPATIENT)
Dept: PHYSICAL THERAPY | Facility: CLINIC | Age: 54
End: 2024-07-05
Payer: COMMERCIAL

## 2024-07-08 ENCOUNTER — APPOINTMENT (OUTPATIENT)
Dept: PRIMARY CARE | Facility: CLINIC | Age: 54
End: 2024-07-08
Payer: COMMERCIAL

## 2024-07-08 VITALS
WEIGHT: 178.5 LBS | SYSTOLIC BLOOD PRESSURE: 115 MMHG | DIASTOLIC BLOOD PRESSURE: 77 MMHG | HEART RATE: 92 BPM | TEMPERATURE: 97.9 F | HEIGHT: 63 IN | BODY MASS INDEX: 31.63 KG/M2 | OXYGEN SATURATION: 95 %

## 2024-07-08 DIAGNOSIS — E66.09 CLASS 1 OBESITY DUE TO EXCESS CALORIES WITHOUT SERIOUS COMORBIDITY WITH BODY MASS INDEX (BMI) OF 32.0 TO 32.9 IN ADULT: ICD-10-CM

## 2024-07-08 PROCEDURE — 99213 OFFICE O/P EST LOW 20 MIN: CPT | Performed by: PHYSICIAN ASSISTANT

## 2024-07-08 PROCEDURE — 3008F BODY MASS INDEX DOCD: CPT | Performed by: PHYSICIAN ASSISTANT

## 2024-07-08 RX ORDER — PHENTERMINE HYDROCHLORIDE 37.5 MG/1
37.5 TABLET ORAL
Qty: 30 TABLET | Refills: 0 | Status: SHIPPED | OUTPATIENT
Start: 2024-07-08 | End: 2024-08-07

## 2024-07-08 RX ORDER — PHENTERMINE HYDROCHLORIDE 37.5 MG/1
37.5 TABLET ORAL
Qty: 30 TABLET | Refills: 0 | Status: SHIPPED | OUTPATIENT
Start: 2024-07-08 | End: 2024-07-08 | Stop reason: SDUPTHER

## 2024-07-08 RX ORDER — PHENTERMINE HYDROCHLORIDE 37.5 MG/1
37.5 TABLET ORAL
Qty: 30 TABLET | Refills: 0 | Status: CANCELLED | OUTPATIENT
Start: 2024-07-08 | End: 2024-08-07

## 2024-07-08 ASSESSMENT — PATIENT HEALTH QUESTIONNAIRE - PHQ9
SUM OF ALL RESPONSES TO PHQ9 QUESTIONS 1 AND 2: 0
2. FEELING DOWN, DEPRESSED OR HOPELESS: NOT AT ALL
1. LITTLE INTEREST OR PLEASURE IN DOING THINGS: NOT AT ALL

## 2024-07-08 NOTE — PROGRESS NOTES
Subjective   Patient ID: Zainab Lagunas is a 54 y.o. female who presents for Follow-up.    HPI  Here for adipex # 3 mo   Lost  #7lbs with diet changes and Adipex use.   6/5/24 last fill of Adipex  Goal weight is 160#  Energy level is good, no insomnia  + dry mouth, no constipation      OARRS:  Radha Quiroz PA-C on 7/8/2024  3:11 PM  I have personally reviewed the OARRS report for Zainab Lagunas. I have considered the risks of abuse, dependence, addiction and diversion and I believe that it is clinically appropriate for Zainab Lagunas to be prescribed this medication    Is the patient prescribed a combination of a benzodiazepine and opioid?  No    Last Urine Drug Screen / ordered today: Yes  Recent Results (from the past 8760 hour(s))   Amphetamine Confirm, Urine    Collection Time: 05/08/24  9:44 AM   Result Value Ref Range    Methamphetamine Quant, Ur <200 ng/mL    MDA, Urine <200 ng/mL    MDEA, Urine <200 ng/mL    Phentermine,Urine >5000 ng/mL    Amphetamines,Urine <50 ng/mL    MDMA, Urine <200 ng/mL   Drug Screen, Urine With Reflex to Confirmation    Collection Time: 05/08/24  9:44 AM   Result Value Ref Range    Amphetamine Screen, Urine Presumptive Positive (A) Presumptive Negative    Barbiturate Screen, Urine Presumptive Negative Presumptive Negative    Benzodiazepines Screen, Urine Presumptive Negative Presumptive Negative    Cannabinoid Screen, Urine Presumptive Negative Presumptive Negative    Cocaine Metabolite Screen, Urine Presumptive Negative Presumptive Negative    Fentanyl Screen, Urine Presumptive Negative Presumptive Negative    Opiate Screen, Urine Presumptive Negative Presumptive Negative    Oxycodone Screen, Urine Presumptive Negative Presumptive Negative    PCP Screen, Urine Presumptive Negative Presumptive Negative    Methadone Screen, Urine Presumptive Negative Presumptive Negative     Results are as expected.         Controlled Substance Agreement:  Date of the Last  "Agreement: 5/8/24  Reviewed Controlled Substance Agreement including but not limited to the benefits, risks, and alternatives to treatment with a Controlled Substance medication(s).    Stimulants:   What is the patient's goal of therapy? Weight loss  Is this being achieved with current treatment? yes    Activities of Daily Living:   Is your overall impression that this patient is benefiting (symptom reduction outweighs side effects) from stimulant therapy? Yes     1. Physical Functioning: Better  2. Family Relationship: Better  3. Social Relationship: Better  4. Mood: Better  5. Sleep Patterns: Better  6. Overall Function: Better          Review of Systems  Constitutional: Patient denies any fever, chills, loss of appetite, or unexplained weight loss.  Cardiovascular: Denies any chest pain, shortness of breath with exertion, tachycardia, palpitations.  Respiratory: Patient denies any cough, shortness of breath, or wheezing.  Skin:  Denies any rashes or skin lesions.    Objective   /77   Pulse 92   Temp 36.6 °C (97.9 °F) (Temporal)   Ht 1.6 m (5' 3\")   Wt 81 kg (178 lb 8 oz)   SpO2 95%   BMI 31.62 kg/m²     Physical Exam  Gen. appearance: Alert and cooperative, no acute distress, well-developed, well-nourished obese female.  Neck: Supple and without adenopathy or rigidity.  There is no JVD at 90° and no carotid bruits are noted.  Cardiovascular: Heart has a regular rate and rhythm without murmur or ectopy.  Respiratory: Lungs are clear to auscultation bilaterally with good air exchange.      Assessment/Plan   Diagnoses and all orders for this visit:  Class 1 obesity due to excess calories without serious comorbidity with body mass index (BMI) of 32.0 to 32.9 in adult  -     Follow Up In [facility] - PCP - Established  -     phentermine (Adipex-P) 37.5 mg tablet; Take 1 tablet (37.5 mg) by mouth once daily in the morning. Take before meals.  -     Follow Up In [facility] - PCP - " Established; Future  Patient is to continue current dose of Adipex and follow-up in 1 month.    We discussed the risks/benefits/side effects of Adipex at length again. She/He understands similar medications have had adverse effects on heart valves in the past and there is no guarantee that Adipex cannot have similar side effects. We discussed that the medication is potentially abusable and potentially addictive. We again discussed that she/he can only continue on the medication for 3 months before needing a 6 month drug-free interval. We discussed that we will need to discontinue the medication should she/he fail to lose weight.  I see no signs of substance abuse today    Patient understands that should they have testing outside   facilities that we may not receive the results and was told to call us if they have not heard from our office within a week after testing.    [facility] uses voice recognition technology for dictations. Sometimes the software misinterprets words. Please take this into account when reading this.

## 2024-07-09 ENCOUNTER — TREATMENT (OUTPATIENT)
Dept: PHYSICAL THERAPY | Facility: CLINIC | Age: 54
End: 2024-07-09
Payer: COMMERCIAL

## 2024-07-09 DIAGNOSIS — S82.62XD CLOSED FRACTURE OF DISTAL LATERAL MALLEOLUS OF ANKLE WITH ROUTINE HEALING, LEFT: ICD-10-CM

## 2024-07-09 DIAGNOSIS — S82.65XD CLOSED NONDISPLACED FRACTURE OF LATERAL MALLEOLUS OF LEFT FIBULA WITH ROUTINE HEALING: ICD-10-CM

## 2024-07-09 PROCEDURE — 97140 MANUAL THERAPY 1/> REGIONS: CPT | Mod: GP,CQ

## 2024-07-09 PROCEDURE — 97110 THERAPEUTIC EXERCISES: CPT | Mod: GP,CQ

## 2024-07-09 NOTE — PROGRESS NOTES
Patient Name: Zainab Lagunas  MRN: [id number]  Today's Date: 7/9/2024  Time Calculation  Start Time: 1445  Stop Time: 1530  Time Calculation (min): 45 min  Visit #4  2024 // [facility] Claim# [id number],   AUTH 12 VISITS, 4/19-7/19/24 //   S82.65XA // Reference# [id number]     Subjective:  Reports that she continues to have L dorsal foot . Describes as being  achy .    Objective:  SL LE balance 8 seconds with increased ankle strategy .    Assessment:   Decreased L Single LE stabilization     Plan:   Progress L ankle stabilization     Treatment :   THERAPEUTIC EXERCISE 34512 35 minutes 2 units   Nu-step 5 min Lv2  GS/soleus stretch 2x30 each   Foam heel /toe raises x20  YTB steamboats x10 each   Slow marches on airex surface x30 ea  L CKC on foam 12 inch tap x10   Tandem stepping on foam x8 laps   Foam side stepping 2 minutes   1/2 lunge on foam x10   Side stepping on foam 2 minutes   Leg Press/Gastroc  30# x10 /50# x10   MANUAL TECHNIQUES  99163 10 minutes 1 units   Talocrural post glides   Calcaneal distraction   Toe distraction /Grt toe distraction  Passive stretching toes/ankle      Current Problem:  1. Closed nondisplaced fracture of lateral malleolus of left fibula with routine healing  Follow Up In Physical Therapy      2. Closed fracture of distal lateral malleolus of ankle with routine healing, left  Follow Up In Physical Therapy

## 2024-07-11 ENCOUNTER — TREATMENT (OUTPATIENT)
Dept: PHYSICAL THERAPY | Facility: CLINIC | Age: 54
End: 2024-07-11
Payer: COMMERCIAL

## 2024-07-11 DIAGNOSIS — S82.65XD CLOSED NONDISPLACED FRACTURE OF LATERAL MALLEOLUS OF LEFT FIBULA WITH ROUTINE HEALING: ICD-10-CM

## 2024-07-11 DIAGNOSIS — S82.62XD CLOSED FRACTURE OF DISTAL LATERAL MALLEOLUS OF ANKLE WITH ROUTINE HEALING, LEFT: ICD-10-CM

## 2024-07-11 PROCEDURE — 97110 THERAPEUTIC EXERCISES: CPT | Mod: GP

## 2024-07-11 NOTE — PROGRESS NOTES
Patient Name: Zainab Lagunas  MRN: [id number]  Today's Date: 7/11/2024  Time Calculation  Start Time: 1440  Stop Time: 1518  Time Calculation (min): 38 min      Visit #5  2024 // [facility] Claim# [id number],   AUTH 12 VISITS, 4/19-7/19/24 //   S82.65XA // Reference# [id number]     Subjective:  Reports that she has intermittent foot soreness with ambulation at work.    Objective:  SL LE balance 10 seconds with increased ankle strategy .    Assessment:   Progressing well with ankle strength. Still showing some fatigue with SL stability but improving each session. Added heel raises with tennis ball to HEP.    Plan:   Progress L ankle stabilization     Treatment :   THERAPEUTIC EXERCISE 04447 38 minutes 3 units   Nu-step 5 min Lv2  GS/soleus stretch 2x30 each   Foam heel /toe raises x20  YTB steamboats x10 each   Slow marches on airex surface x30 ea  L CKC on foam 12 inch tap x10   Tandem stepping on foam x8 laps   Foam side stepping 2 minutes   1/2 lunge on foam x10   Side stepping on foam 2 minutes   Leg Press/Gastroc  30# x10 /50#-NV  Heel Raises with tennis ball between heels 2x15  3-way ankle with BTB 2x10     Current Problem:  1. Closed nondisplaced fracture of lateral malleolus of left fibula with routine healing  Follow Up In Physical Therapy      2. Closed fracture of distal lateral malleolus of ankle with routine healing, left  Follow Up In Physical Therapy

## 2024-07-16 ENCOUNTER — TREATMENT (OUTPATIENT)
Dept: PHYSICAL THERAPY | Facility: CLINIC | Age: 54
End: 2024-07-16
Payer: COMMERCIAL

## 2024-07-16 DIAGNOSIS — S82.62XD CLOSED FRACTURE OF DISTAL LATERAL MALLEOLUS OF ANKLE WITH ROUTINE HEALING, LEFT: ICD-10-CM

## 2024-07-16 DIAGNOSIS — S82.65XD CLOSED NONDISPLACED FRACTURE OF LATERAL MALLEOLUS OF LEFT FIBULA WITH ROUTINE HEALING: ICD-10-CM

## 2024-07-16 PROCEDURE — 97110 THERAPEUTIC EXERCISES: CPT | Mod: GP,CQ

## 2024-07-16 NOTE — PROGRESS NOTES
Patient Name: Zaniab Lagunas  MRN: [id number]  Today's Date: 7/16/2024  Time Calculation  Start Time: 1450  Stop Time: 1530  Time Calculation (min): 40 min  Visit #6  2024 // [facility] Claim# [id number],   AUTH 12 VISITS, 4/19-7/19/24 //   S82.65XA // Reference# [id number]     Subjective:  Reports that L ankle is feeling better . Denies any dorsal foot pain.     Objective:  Gait no deviations noted .    Assessment:   Decreased hip and ankle stability still noted however is improving . Improved gait quality with today's session. Pt is making gains towards goals.     Plan:   Cont with increasing hip /ankle stability.    Treatment :   THERAPEUTIC EXERCISE 70142 40 minutes 3 units   Recumbent  Lv2 5 minutes   GS/soleus stretch 2x30 each   Prop board ant/post /balance x15 /1 minute   Foam heel /toe raises x20  YTB steamboats x10 each   Slow marches on airex surface x30 ea   SL R tap on step  12 inch on foam  x20   L CKC on foam 12 inch tap x10   Tandem stepping on foam x8 laps   Foam side stepping 2 minutes   Side stepping on foam 2 minutes   Leg Press/Gastroc 60 #   Heel Raises with tennis ball between heels 2x15 HEP  3-way ankle with BTB 2x10  Step down 4 inch fwd x10 each    Side stepping with RTB 2 minutes        Current Problem:  1. Closed nondisplaced fracture of lateral malleolus of left fibula with routine healing  Follow Up In Physical Therapy      2. Closed fracture of distal lateral malleolus of ankle with routine healing, left  Follow Up In Physical Therapy              Pain:  No pain currently .

## 2024-07-18 ENCOUNTER — APPOINTMENT (OUTPATIENT)
Dept: PHYSICAL THERAPY | Facility: CLINIC | Age: 54
End: 2024-07-18
Payer: COMMERCIAL

## 2024-07-23 ENCOUNTER — TREATMENT (OUTPATIENT)
Dept: PHYSICAL THERAPY | Facility: CLINIC | Age: 54
End: 2024-07-23
Payer: COMMERCIAL

## 2024-07-23 DIAGNOSIS — S82.65XD CLOSED NONDISPLACED FRACTURE OF LATERAL MALLEOLUS OF LEFT FIBULA WITH ROUTINE HEALING: ICD-10-CM

## 2024-07-23 DIAGNOSIS — S82.62XD CLOSED FRACTURE OF DISTAL LATERAL MALLEOLUS OF ANKLE WITH ROUTINE HEALING, LEFT: ICD-10-CM

## 2024-07-23 PROCEDURE — 97110 THERAPEUTIC EXERCISES: CPT | Mod: GP

## 2024-07-23 NOTE — PROGRESS NOTES
Patient Name: Zainab Lagunas  MRN: [id number]  Today's Date: 7/23/2024  Time Calculation  Start Time: 1513  Stop Time: 1551  Time Calculation (min): 38 min      Visit #7 2024 // [facility] Claim# [id number],   AUTH 12 VISITS, 4/19-8/12/24   S82.65XA // Reference# [id number]     Subjective:  Reports that L ankle is feeling good.    Objective:  Gait no deviations noted .    Assessment:   Progressively improving with ankle and foot stability. Progressed resisted ankle strengthening to WB which patient perform with appropriate degree of fatigue. No pain noted throughout session.    Plan:   Cont with increasing hip /ankle stability.    Treatment :   THERAPEUTIC EXERCISE 13738 40 minutes 3 units   Recumbent  Lv3 7 minutes   GS/soleus stretch 2x30 each   Prop board ant/post /balance x15 /1 minute   Foam heel /toe raises x20  YTB steamboats x10 each   Slow marches on airex surface x30 ea   SL R tap on step  12 inch on foam  x20   L CKC on foam 12 inch tap x10   Tandem stepping on foam x8 laps   Foam side stepping 2 minutes   Side stepping on foam 2 minutes   Heel Raises with tennis ball between heels 2x15 HEP  2-way ankle with BTB 2x10 standing   Step down 4 inch lateral x10 each    Side stepping with RTB 2 minutes        Current Problem:  1. Closed nondisplaced fracture of lateral malleolus of left fibula with routine healing  Follow Up In Physical Therapy      2. Closed fracture of distal lateral malleolus of ankle with routine healing, left  Follow Up In Physical Therapy              Pain:  No pain currently .

## 2024-07-25 ENCOUNTER — TREATMENT (OUTPATIENT)
Dept: PHYSICAL THERAPY | Facility: CLINIC | Age: 54
End: 2024-07-25
Payer: COMMERCIAL

## 2024-07-25 DIAGNOSIS — S82.62XD CLOSED FRACTURE OF DISTAL LATERAL MALLEOLUS OF ANKLE WITH ROUTINE HEALING, LEFT: ICD-10-CM

## 2024-07-25 DIAGNOSIS — S82.65XD CLOSED NONDISPLACED FRACTURE OF LATERAL MALLEOLUS OF LEFT FIBULA WITH ROUTINE HEALING: ICD-10-CM

## 2024-07-25 PROCEDURE — 97110 THERAPEUTIC EXERCISES: CPT | Mod: GP

## 2024-07-25 NOTE — PROGRESS NOTES
Patient Name: Zainab Lagunas  MRN: [id number]  Today's Date: 7/25/2024  Time Calculation  Start Time: 1428  Stop Time: 1506  Time Calculation (min): 38 min      Visit #8  2024 // [facility] Claim# [id number],   AUTH 12 VISITS, 4/19-8/12/24   S82.65XA // Reference# [id number]     Subjective:  Denies any ankle pain. Patient curious if she can start riding her bike.    Objective:  Gait no deviations noted .    Assessment:   Appropriately challenged with addition of multi plane stepping on various step heights and airex surface. No pain reported during or after session.    Plan:   Cont with increasing hip /ankle stability.    Treatment :   THERAPEUTIC EXERCISE 20450 40 minutes 3 units   Recumbent  Lv3 7 minutes   GS/soleus stretch 2x30 each   Prop board ant/post /balance x15 /1 minute   Foam heel /toe raises x20   SL R tap on step  12 inch on foam  x20   L CKC on foam 12 inch tap x10   Tandem stepping on foam x8 laps   Foam side stepping 2 minutes   Side stepping on foam 2 minutes   Heel Raises with tennis ball between heels 2x15 HEP  Step down 4 inch lateral x10 each    Side stepping with RTB 2 minutes   Multi-plane stepping (2', 4' 6' steps, airex pad) 5 min       Current Problem:  1. Closed nondisplaced fracture of lateral malleolus of left fibula with routine healing  Follow Up In Physical Therapy      2. Closed fracture of distal lateral malleolus of ankle with routine healing, left  Follow Up In Physical Therapy              Pain:  No pain currently .

## 2024-07-30 ENCOUNTER — APPOINTMENT (OUTPATIENT)
Dept: PHYSICAL THERAPY | Facility: CLINIC | Age: 54
End: 2024-07-30
Payer: COMMERCIAL

## 2024-08-01 ENCOUNTER — APPOINTMENT (OUTPATIENT)
Dept: PHYSICAL THERAPY | Facility: CLINIC | Age: 54
End: 2024-08-01
Payer: COMMERCIAL

## 2024-08-06 ENCOUNTER — APPOINTMENT (OUTPATIENT)
Dept: PHYSICAL THERAPY | Facility: CLINIC | Age: 54
End: 2024-08-06
Payer: COMMERCIAL

## 2024-08-07 ENCOUNTER — APPOINTMENT (OUTPATIENT)
Dept: PRIMARY CARE | Facility: CLINIC | Age: 54
End: 2024-08-07
Payer: COMMERCIAL

## 2024-08-08 ENCOUNTER — APPOINTMENT (OUTPATIENT)
Dept: PHYSICAL THERAPY | Facility: CLINIC | Age: 54
End: 2024-08-08
Payer: COMMERCIAL

## 2024-09-17 ENCOUNTER — OFFICE VISIT (OUTPATIENT)
Dept: PRIMARY CARE | Facility: CLINIC | Age: 54
End: 2024-09-17
Payer: COMMERCIAL

## 2024-09-17 VITALS
OXYGEN SATURATION: 98 % | SYSTOLIC BLOOD PRESSURE: 138 MMHG | BODY MASS INDEX: 32.07 KG/M2 | HEART RATE: 73 BPM | TEMPERATURE: 97.2 F | WEIGHT: 181 LBS | DIASTOLIC BLOOD PRESSURE: 81 MMHG | RESPIRATION RATE: 16 BRPM | HEIGHT: 63 IN

## 2024-09-17 DIAGNOSIS — J20.9 ACUTE BRONCHITIS, UNSPECIFIED ORGANISM: ICD-10-CM

## 2024-09-17 DIAGNOSIS — R05.1 ACUTE COUGH: Primary | ICD-10-CM

## 2024-09-17 LAB — POC SARS-COV-2 AG BINAX: NORMAL

## 2024-09-17 PROCEDURE — 99213 OFFICE O/P EST LOW 20 MIN: CPT | Performed by: FAMILY MEDICINE

## 2024-09-17 PROCEDURE — 87811 SARS-COV-2 COVID19 W/OPTIC: CPT | Performed by: FAMILY MEDICINE

## 2024-09-17 PROCEDURE — 3008F BODY MASS INDEX DOCD: CPT | Performed by: FAMILY MEDICINE

## 2024-09-17 RX ORDER — BENZONATATE 200 MG/1
200 CAPSULE ORAL 3 TIMES DAILY PRN
Qty: 21 CAPSULE | Refills: 0 | Status: SHIPPED | OUTPATIENT
Start: 2024-09-17 | End: 2024-09-24

## 2024-09-17 RX ORDER — CEFUROXIME AXETIL 250 MG/1
250 TABLET ORAL 2 TIMES DAILY
Qty: 20 TABLET | Refills: 0 | Status: SHIPPED | OUTPATIENT
Start: 2024-09-17 | End: 2024-09-27

## 2024-09-17 ASSESSMENT — ENCOUNTER SYMPTOMS
APPETITE CHANGE: 1
FEVER: 0
WHEEZING: 1
COUGH: 1
DIARRHEA: 0
MYALGIAS: 1
RHINORRHEA: 1
NAUSEA: 1
SORE THROAT: 0
FATIGUE: 1
ABDOMINAL PAIN: 0
VOMITING: 0

## 2024-09-17 NOTE — PATIENT INSTRUCTIONS
Follow up in 4-5 days if not improving or sooner if worsening or new symptoms develop.    It was a pleasure to see you today. Thank you for choosing us for your health care needs.    If you have lab or other testing completed and have not been informed of results within one week, please call the office for your results.    If you haven't done so, consider signing up for [facility] [employer], the [facility] personal health record. Ask the staff how you can get started.

## 2024-09-17 NOTE — LETTER
September 17, 2024     Patient: Zainab Lagunas   YOB: 1970   Date of Visit: 9/17/2024       To Whom It May Concern:    Zainab Lagunas was seen in my clinic on 9/17/2024.   Please excuse Zainab for her absence from work.  She may return on 9/23/2024.    If you have any questions or concerns, please don't hesitate to call.         Sincerely,         Tank Salomon,         CC: No Recipients

## 2024-09-17 NOTE — PROGRESS NOTES
"Subjective   Patient ID: Zainab Lagunas is a 54 y.o. female who presents for Sick Visit.      HPI     Sick x 6 day.    Pt complaining of muscle pain, sneezing, headache, stuffy nose,  phlegm (greenish/yellow), poor appetite, thirsty, chills.     Patient has tried OTC cold and flu and sudafed, patient states didn't really help.     Patient did do in home COVID test came back negative but the tests were  .    Review of Systems   Constitutional:  Positive for appetite change and fatigue. Negative for fever.   HENT:  Positive for congestion, rhinorrhea and sneezing. Negative for ear pain and sore throat.    Respiratory:  Positive for cough and wheezing.    Gastrointestinal:  Positive for nausea. Negative for abdominal pain, diarrhea and vomiting.   Musculoskeletal:  Positive for myalgias.       Objective   /81 (BP Location: Right arm, Patient Position: Sitting, BP Cuff Size: Large adult)   Pulse 73   Temp 36.2 °C (97.2 °F)   Resp 16   Ht 1.6 m (5' 3\")   Wt 82.1 kg (181 lb)   SpO2 98%   BMI 32.06 kg/m²     Physical Exam  HEENT:  Head is normocephalic and atraumatic.  Mucous membranes are moist, external auditory canals and tympanic membranes within normal limits bilaterally, pharynx is without erythema or exudate.  There is a trace of clear/thin rhinorrhea.  NECK:  Supple and without adenopathy or rigidity.  HEART:  Regular rate and rhythm without murmur or ectopy  LUNGS:  Coarse breath sounds are noted bilaterally with a coarse frequent cough.  Good air exchange  SKIN:  Good turgor, moist, warm and without rashes or lesions    Assessment/Plan   Assessment & Plan  Acute cough    Orders:    POCT BinaxNOW Covid-19 Ag Card manually resulted    Acute bronchitis, unspecified organism  COVID testing was negative today.  Will start her on a 10-day course of Ceftin and Tessalon as needed for the cough.  Patient to follow-up in 4 to 5 days if not markedly improved or sooner should the symptoms worsen " or new symptoms develop.  She was provided with a work note with a return to work date of 9/23/2024  Orders:    cefuroxime (Ceftin) 250 mg tablet; Take 1 tablet (250 mg) by mouth 2 times a day for 10 days.    benzonatate (Tessalon) 200 mg capsule; Take 1 capsule (200 mg) by mouth 3 times a day as needed for cough for up to 7 days. Do not crush or chew.      Results for orders placed or performed in visit on 09/17/24   POCT BinaxNOW Covid-19 Ag Card manually resulted   Result Value Ref Range    POC EVER-COV-2 AG  Presumptive negative test for SARS-CoV-2 (no antigen detected)     Presumptive negative test for SARS-CoV-2 (no antigen detected)

## 2024-11-01 ENCOUNTER — HOSPITAL ENCOUNTER (OUTPATIENT)
Dept: RADIOLOGY | Facility: CLINIC | Age: 54
Discharge: HOME | End: 2024-11-01
Payer: COMMERCIAL

## 2024-11-01 DIAGNOSIS — Z12.39 BREAST CANCER SCREENING, HIGH RISK PATIENT: ICD-10-CM

## 2024-11-01 DIAGNOSIS — R92.30 DENSE BREAST TISSUE: ICD-10-CM

## 2024-11-01 PROCEDURE — 77049 MRI BREAST C-+ W/CAD BI: CPT

## 2024-11-01 PROCEDURE — A9575 INJ GADOTERATE MEGLUMI 0.1ML: HCPCS | Performed by: NURSE PRACTITIONER

## 2024-11-01 PROCEDURE — 2550000001 HC RX 255 CONTRASTS: Performed by: NURSE PRACTITIONER

## 2024-11-01 RX ORDER — GADOTERATE MEGLUMINE 376.9 MG/ML
17 INJECTION INTRAVENOUS
Status: COMPLETED | OUTPATIENT
Start: 2024-11-01 | End: 2024-11-01

## 2024-11-06 ENCOUNTER — OFFICE VISIT (OUTPATIENT)
Dept: PRIMARY CARE | Facility: CLINIC | Age: 54
End: 2024-11-06
Payer: COMMERCIAL

## 2024-11-06 VITALS
OXYGEN SATURATION: 95 % | TEMPERATURE: 98.1 F | HEIGHT: 63 IN | HEART RATE: 90 BPM | WEIGHT: 184 LBS | BODY MASS INDEX: 32.6 KG/M2 | SYSTOLIC BLOOD PRESSURE: 133 MMHG | RESPIRATION RATE: 16 BRPM | DIASTOLIC BLOOD PRESSURE: 92 MMHG

## 2024-11-06 DIAGNOSIS — E66.811 CLASS 1 OBESITY WITH SERIOUS COMORBIDITY AND BODY MASS INDEX (BMI) OF 32.0 TO 32.9 IN ADULT, UNSPECIFIED OBESITY TYPE: ICD-10-CM

## 2024-11-06 DIAGNOSIS — T14.8XXA MUSCLE STRAIN: Primary | ICD-10-CM

## 2024-11-06 DIAGNOSIS — M79.12 STERNOCLEIDOMASTOID MUSCLE TENDERNESS: ICD-10-CM

## 2024-11-06 DIAGNOSIS — M54.2 NECK PAIN ON RIGHT SIDE: ICD-10-CM

## 2024-11-06 PROCEDURE — 99214 OFFICE O/P EST MOD 30 MIN: CPT | Performed by: NURSE PRACTITIONER

## 2024-11-06 RX ORDER — METHOCARBAMOL 500 MG/1
500 TABLET, FILM COATED ORAL 4 TIMES DAILY PRN
Qty: 40 TABLET | Refills: 0 | Status: SHIPPED | OUTPATIENT
Start: 2024-11-06 | End: 2025-01-05

## 2024-11-06 RX ORDER — DICLOFENAC SODIUM 75 MG/1
75 TABLET, DELAYED RELEASE ORAL 2 TIMES DAILY PRN
Qty: 60 TABLET | Refills: 0 | Status: SHIPPED | OUTPATIENT
Start: 2024-11-06 | End: 2025-01-05

## 2024-11-06 ASSESSMENT — PAIN SCALES - GENERAL: PAINLEVEL_OUTOF10: 5

## 2024-11-06 ASSESSMENT — ENCOUNTER SYMPTOMS
OCCASIONAL FEELINGS OF UNSTEADINESS: 0
DEPRESSION: 0
LOSS OF SENSATION IN FEET: 0

## 2024-11-06 NOTE — PROGRESS NOTES
Subjective   Patient ID: Zainab Lagunas is a 54 y.o. female who is with complaint of pain and tenderness on the right side of the neck.    HPI  Patient is a 54 y.o. female who CONSULTED AT [facility] today. She is with complaint of pain and tenderness on the right side of the neck. Patient states condition started about 1 year ago, on and off, but became more often last month. Patient states the pain is on the right side of his anterior neck (area of right sternocleidomastoid muscle), achy in character, 3-6/10, intermittent, aggravated by movement, and non radiating. she denies fever, chills, paresthesia, paralysis, nor change in the color of the overlying skin. she states she tried OTC medications which afforded only slight relief of symptoms. She is also having chronic fatigue.     Review of Systems  General: no weight loss, generally healthy, (+) chronic fatigue  Head:  no headaches / sinus pain, no vertigo, no injury  Eyes: no diplopia, no tearing, no pain,   Ears: no change in hearing, no tinnitus, no bleeding, no vertigo  Mouth:  no dental difficulties, no gingival bleeding, no sore throat, no loss of sense of taste  Nose: no congestion, no  discharge, no bleeding, no obstruction, no loss of sense of smell  Neck: no stiffness, no pain, no tenderness, no masses, no bruit  Pulmonary: no dyspnea, no wheezing, no hemoptysis, no cough  Cardiovascular: no chest pain, no palpitations, no syncope, no orthopnea  Gastrointestinal: no change in appetite, no dysphagia, no abdominal pains, no diarrhea, no emesis, no melena  Genito Urinary: no dysuria, no urinary urgency, no nocturia, no incontinence, no change in nature of urine  Musculoskeletal: (+) pain and tenderness on the right side of the neck (area of the right sternocleidomastoid muscle), no joint pain, no limitation of range of motion, no paresthesia, no numbness  Constitutional: no fever, no chills, no night sweats    Objective    Physical Exam  General: ambulatory, in no acute distress  Head: normocephalic, no lesions  Ears: clear external auditory canals, no ear discharge, no bleeding from the ears, tympanic membrane intact  Throat: clear, no exudate, no lesions  Chest: symmetrical chest expansion, no lagging, no retractions, clear breath sounds, no rales, no wheezes  Musculoskeletal: (+) tenderness and slight swelling on the right sternocleidomastoid muscle area, no limitation of range of motion, no paralysis, no deformity  Extremities: full and equal peripheral pulses, no edema,    Assessment/Plan   Problem List Items Addressed This Visit    None  Visit Diagnoses         Codes    Muscle strain    -  Primary T14.8XXA    Relevant Medications    methocarbamol (Robaxin) 500 mg tablet    diclofenac (Voltaren) 75 mg EC tablet    Sternocleidomastoid muscle tenderness     M79.12    Relevant Medications    methocarbamol (Robaxin) 500 mg tablet    diclofenac (Voltaren) 75 mg EC tablet    Neck pain on right side     M54.2    Relevant Medications    methocarbamol (Robaxin) 500 mg tablet    diclofenac (Voltaren) 75 mg EC tablet    BMI 32.0-32.9,adult     Z68.32    Class 1 obesity with serious comorbidity and body mass index (BMI) of 32.0 to 32.9 in adult, unspecified obesity type     E66.811, Z68.32        DISCHARGE SUMMARY:   Patient was seen and examined. Diagnosis, treatment, treatment options, and possible complications of today's illness discussed and explained to patient. Patient to take medication/s associated with this visit.  Patient may use OTC menthol patches as needed for comfort. Advised stretching and warm up exercises as tolerated prior to more intense physical exertion / exercise.  Advised to avoid heavy lifting, pulling, or pushing for at least a week. Reinforce stretching and exercises that strengthen core muscles.     Patient to come back in 4 - 7 days if needed for worsening symptoms. Patient verbalized understanding of plan of  care.           Mike Smallwood, APRN-CNP 11/06/24 3:10 PM

## 2024-11-06 NOTE — PROGRESS NOTES
"Subjective   Patient ID: Zainab Lagunas is a 54 y.o. female who presents for Neck Pain.      Symptoms: fatigued,  can't get out of bed at times, sluggish,  right shoulder and neck pain with swelling in the right side of throat, double vision at times, anxiousness, can't sleep at night,   Length of symptoms:  1 yr ago but 2 months ago its been gradually getting worse.  OTC: none  Related information:    HPI     Review of Systems    Objective   BP (!) 133/92 Comment: auto  Pulse 90   Temp 36.7 °C (98.1 °F)   Resp 16   Ht 1.6 m (5' 3\")   Wt 83.5 kg (184 lb)   SpO2 95%   BMI 32.59 kg/m²     Physical Exam    Assessment/Plan          "

## 2024-11-18 ENCOUNTER — LAB (OUTPATIENT)
Dept: LAB | Facility: LAB | Age: 54
End: 2024-11-18
Payer: COMMERCIAL

## 2024-11-18 ENCOUNTER — HOSPITAL ENCOUNTER (OUTPATIENT)
Dept: RADIOLOGY | Facility: CLINIC | Age: 54
Discharge: HOME | End: 2024-11-18
Payer: COMMERCIAL

## 2024-11-18 ENCOUNTER — APPOINTMENT (OUTPATIENT)
Dept: PRIMARY CARE | Facility: CLINIC | Age: 54
End: 2024-11-18
Payer: COMMERCIAL

## 2024-11-18 VITALS
HEART RATE: 80 BPM | SYSTOLIC BLOOD PRESSURE: 123 MMHG | HEIGHT: 63 IN | OXYGEN SATURATION: 97 % | WEIGHT: 182 LBS | TEMPERATURE: 97.9 F | RESPIRATION RATE: 16 BRPM | BODY MASS INDEX: 32.25 KG/M2 | DIASTOLIC BLOOD PRESSURE: 81 MMHG

## 2024-11-18 DIAGNOSIS — E04.1 RIGHT THYROID NODULE: ICD-10-CM

## 2024-11-18 DIAGNOSIS — E04.1 RIGHT THYROID NODULE: Primary | ICD-10-CM

## 2024-11-18 DIAGNOSIS — R53.83 FATIGUE, UNSPECIFIED TYPE: ICD-10-CM

## 2024-11-18 LAB
ALBUMIN SERPL BCP-MCNC: 4.5 G/DL (ref 3.4–5)
ALP SERPL-CCNC: 60 U/L (ref 33–110)
ALT SERPL W P-5'-P-CCNC: 14 U/L (ref 7–45)
ANION GAP SERPL CALC-SCNC: 10 MMOL/L (ref 10–20)
AST SERPL W P-5'-P-CCNC: 13 U/L (ref 9–39)
BASOPHILS # BLD AUTO: 0.08 X10*3/UL (ref 0–0.1)
BASOPHILS NFR BLD AUTO: 1 %
BILIRUB SERPL-MCNC: 0.4 MG/DL (ref 0–1.2)
BUN SERPL-MCNC: 12 MG/DL (ref 6–23)
CALCIUM SERPL-MCNC: 9.7 MG/DL (ref 8.6–10.3)
CHLORIDE SERPL-SCNC: 104 MMOL/L (ref 98–107)
CO2 SERPL-SCNC: 30 MMOL/L (ref 21–32)
CREAT SERPL-MCNC: 0.66 MG/DL (ref 0.5–1.05)
EGFRCR SERPLBLD CKD-EPI 2021: >90 ML/MIN/1.73M*2
EOSINOPHIL # BLD AUTO: 0.18 X10*3/UL (ref 0–0.7)
EOSINOPHIL NFR BLD AUTO: 2.2 %
ERYTHROCYTE [DISTWIDTH] IN BLOOD BY AUTOMATED COUNT: 13.2 % (ref 11.5–14.5)
GLUCOSE SERPL-MCNC: 87 MG/DL (ref 74–99)
HCT VFR BLD AUTO: 40.9 % (ref 36–46)
HGB BLD-MCNC: 13.4 G/DL (ref 12–16)
IMM GRANULOCYTES # BLD AUTO: 0.01 X10*3/UL (ref 0–0.7)
IMM GRANULOCYTES NFR BLD AUTO: 0.1 % (ref 0–0.9)
LYMPHOCYTES # BLD AUTO: 2.54 X10*3/UL (ref 1.2–4.8)
LYMPHOCYTES NFR BLD AUTO: 30.9 %
MCH RBC QN AUTO: 31.2 PG (ref 26–34)
MCHC RBC AUTO-ENTMCNC: 32.8 G/DL (ref 32–36)
MCV RBC AUTO: 95 FL (ref 80–100)
MONOCYTES # BLD AUTO: 0.58 X10*3/UL (ref 0.1–1)
MONOCYTES NFR BLD AUTO: 7.1 %
NEUTROPHILS # BLD AUTO: 4.82 X10*3/UL (ref 1.2–7.7)
NEUTROPHILS NFR BLD AUTO: 58.7 %
NRBC BLD-RTO: 0 /100 WBCS (ref 0–0)
PLATELET # BLD AUTO: 256 X10*3/UL (ref 150–450)
POTASSIUM SERPL-SCNC: 4.6 MMOL/L (ref 3.5–5.3)
PROT SERPL-MCNC: 7 G/DL (ref 6.4–8.2)
RBC # BLD AUTO: 4.3 X10*6/UL (ref 4–5.2)
SODIUM SERPL-SCNC: 139 MMOL/L (ref 136–145)
TSH SERPL-ACNC: 2.6 MIU/L (ref 0.44–3.98)
WBC # BLD AUTO: 8.2 X10*3/UL (ref 4.4–11.3)

## 2024-11-18 PROCEDURE — 85025 COMPLETE CBC W/AUTO DIFF WBC: CPT

## 2024-11-18 PROCEDURE — 76536 US EXAM OF HEAD AND NECK: CPT

## 2024-11-18 PROCEDURE — 84443 ASSAY THYROID STIM HORMONE: CPT

## 2024-11-18 PROCEDURE — 80053 COMPREHEN METABOLIC PANEL: CPT

## 2024-11-18 PROCEDURE — 3008F BODY MASS INDEX DOCD: CPT | Performed by: FAMILY MEDICINE

## 2024-11-18 PROCEDURE — 76536 US EXAM OF HEAD AND NECK: CPT | Performed by: STUDENT IN AN ORGANIZED HEALTH CARE EDUCATION/TRAINING PROGRAM

## 2024-11-18 PROCEDURE — 99213 OFFICE O/P EST LOW 20 MIN: CPT | Performed by: FAMILY MEDICINE

## 2024-11-18 NOTE — PROGRESS NOTES
Subjective     Patient ID: Zainab Lagunas is a 54 y.o. female who presents for Neck Pain and Fatigue.    HPI     Patient c/o right sided neck pain 3 weeks.   Patient states swelling and pain when moving head up, down, side to side.   This swelling is reported to have gotten worse since it's original onset about one year ago but exponentially is worse this past month  Tender/sore to touch  Patient went to  walk-in clinic in [place] [11/06/24] was given Robaxin 500mg QID PRN and diclofenac 75mg BID PRN.  Patient states she has not taken them yet, she wants to speak with PCP first as she is concerned dose may be to high and she won't be able to function at work  Patient states she has only been taking Flexeril 10mg which helps her sleep at night  Patient has mild pain along the base of her neck upwards      Patient also c/o of feeling fatigue throughout the day and its starting to affect her home life and work life as well.  Patient was told by a previous nurse that her symptoms sound like hypothyroidism, which runs in the patients family and she is worried this may be an issue    Patient is s/p menopause  Takes fish oil, magnesium, B12, multivitamin    Review of Systems    Cardiovascular: Patient denies any chest pain, shortness of breath with exertion, tachycardia, palpitations, orthopnea, or paroxysmal nocturnal dyspnea.  Respiratory: Patient denies any cough, shortness breath, or wheezing.  Gastrointestinal: Patient denies any nausea, vomiting, diarrhea, constipation, melena, hematochezia, or reflux symptoms  Skin: Denies any rashes or skin lesions  Neurology: Patient denies any new motor or sensory losses. Denies any numbness, tingling, weakness, and incoordination of the extremities. Patient also denies any tremor, seizures, or gait instability.  Endocrinology: Denies any polyuria, polydipsia, polyphagia, or heat/cold intolerance.  Psychiatric: She denies any depression, anxiety, or suicidal/homicidal  "ideation.    Objective   /81 (BP Location: Right arm, Patient Position: Sitting, BP Cuff Size: Large adult)   Pulse 80   Temp 36.6 °C (97.9 °F)   Resp 16   Ht 1.6 m (5' 3\")   Wt 82.6 kg (182 lb)   SpO2 97%   BMI 32.24 kg/m²     Physical Exam    General Appearance: Alert and cooperative, in no acute distress, well-developed/well-nourished obese female    Neck: Supple and without adenopathy or rigidity. There is no JVD at 90° and no carotid bruits are noted. Palpation reveals fullness in the right thyroid gland which is also minimally tender with palpation.     Heart: Regular rate and rhythm without murmur or ectopy.  Lungs: Clear to auscultation bilaterally with good air exchange.  Skin: Good turgor, moist, warm and without rashes or lesions.  Neurological exam: Alert and oriented ×3, no tremor, normal gait.  Extremities: No clubbing, cyanosis, or edema  Psychiatric: Appropriate mood and affect, good insight and judgment, no delusions or thought disorders, no suicidal or homicidal ideation    Assessment/Plan     1. Right thyroid nodule (Primary)  Patient's symptoms have greatly worsened. We would like her to undergo testing to better understand why.   Continue with diclofenac and cyclobenzaprine as discussed.   - TSH with reflex to Free T4 if abnormal; Future  - US thyroid; Future    2. Fatigue, unspecified type  Patient's symptoms have greatly worsened. We would like her to undergo testing to better understand why.   - CBC and Auto Differential; Future  - Comprehensive Metabolic Panel; Future  - TSH with reflex to Free T4 if abnormal; Future      Follow up for results.     Scribe Attestation  By signing my name below, Beatriz BALDERAS Scribe   attest that this documentation has been prepared under the direction and in the presence of Tank Salomon DO.    Orders Placed This Encounter   Procedures    US thyroid    CBC and Auto Differential    Comprehensive Metabolic Panel    TSH with reflex to Free T4 " if abnormal

## 2024-11-18 NOTE — PATIENT INSTRUCTIONS
Use the diclofenac and the cyclobenzaprine as dicussed.  Have labs and ultrasound.    CALL FOR RESULTS AFTER TESTING COMPLETED.

## 2024-11-19 ENCOUNTER — APPOINTMENT (OUTPATIENT)
Dept: RADIOLOGY | Facility: CLINIC | Age: 54
End: 2024-11-19
Payer: COMMERCIAL

## 2024-11-19 ENCOUNTER — PATIENT MESSAGE (OUTPATIENT)
Dept: PRIMARY CARE | Facility: CLINIC | Age: 54
End: 2024-11-19
Payer: COMMERCIAL

## 2024-11-20 DIAGNOSIS — E04.1 RIGHT THYROID NODULE: Primary | ICD-10-CM

## 2024-12-11 ENCOUNTER — APPOINTMENT (OUTPATIENT)
Dept: OTOLARYNGOLOGY | Facility: CLINIC | Age: 54
End: 2024-12-11
Payer: COMMERCIAL

## 2024-12-11 DIAGNOSIS — R59.0 CERVICAL LYMPHADENOPATHY: Primary | ICD-10-CM

## 2024-12-11 DIAGNOSIS — E04.1 RIGHT THYROID NODULE: ICD-10-CM

## 2024-12-11 PROCEDURE — 99203 OFFICE O/P NEW LOW 30 MIN: CPT | Performed by: OTOLARYNGOLOGY

## 2024-12-11 ASSESSMENT — ENCOUNTER SYMPTOMS
NEUROLOGICAL NEGATIVE: 1
CARDIOVASCULAR NEGATIVE: 1
RESPIRATORY NEGATIVE: 1
CONSTITUTIONAL NEGATIVE: 1

## 2024-12-11 NOTE — PROGRESS NOTES
Subjective   Patient ID: Zainab Lagunas is a 54 y.o. female who presents for THYROID NODULES.  She is seen at the kind request of Dr. Tank Salomon.    HPI  Patient presents today for evaluation of a 1 year history of swelling in the right neck.  She has had some slight discomfort and has noted that has gotten distinctly bigger in time.  There is not been an acute sudden change.  She denies any change in voice or swallow.  The remaining ENT inquiry is otherwise grossly clear.  Thyroid ultrasound shows 3.8 cm TR 3 nodule right with 1.6 cm right level 3 node.  No family history of thyroid malignancy.  No radiation exposure personally.      Review of Systems   Constitutional: Negative.    HENT: Negative.     Respiratory: Negative.     Cardiovascular: Negative.    Neurological: Negative.        Physical Exam    General appearance: No acute distress. Normal facies. Symmetric facial movement. No gross lesions of the face are noted.  The external ear structures appear normal. The ear canals patent and the tympanic membranes are intact without evidence of air-fluid levels, retraction, or congenital defects.  Anterior rhinoscopy notes essentially a midline nasal septum. Examination is noted for normal healthy mucosal membranes without any evidence of lesions, polyps, or exudate. The tongue is normally mobile. There are no lesions on the gingiva, buccal, or oral mucosa. There are no oral cavity masses.  The neck is negative for mass lymphadenopathy. The trachea and parotid are clear. The thyroid bed is noted for a rather diffuse fullness with nodular feel right side only without clear-cut associated lymphadenopathy. The salivary gland structures are grossly unremarkable.  The laryngeal structures are noted for grossly normal appearance. The true vocal cords, the false focal cords, and the remaining structures including the epiglottis, piriform sinuses, and base of tongue are all grossly normal. There is no evidence of  gross mass nodule, polyp, tumor or other defect.      Assessment/Plan     54-year-old female with prominent right thyroid nodule.  Thyroid ultrasound shows evidence of a 3.8 cm nodule categorization TR 3 with a 1.6 cm right level 3 lymph node on ultrasound as well.  In light of that we will recommend definitive ultrasound-guided biopsy first and foremost to see what the prominent mass/nodule shows and proceed accordingly with further recommendations.  Very detailed discussion with the patient in this regard with all questions answered.      Thank you again for allowing us to participate in the care of this patient.    This note was created with voice recognition software and has not been corrected for typographical or grammatical errors.

## 2024-12-20 ENCOUNTER — HOSPITAL ENCOUNTER (OUTPATIENT)
Dept: RADIOLOGY | Facility: HOSPITAL | Age: 54
Discharge: HOME | End: 2024-12-20
Payer: COMMERCIAL

## 2024-12-20 VITALS
HEART RATE: 16 BPM | RESPIRATION RATE: 16 BRPM | DIASTOLIC BLOOD PRESSURE: 69 MMHG | OXYGEN SATURATION: 98 % | SYSTOLIC BLOOD PRESSURE: 125 MMHG

## 2024-12-20 PROCEDURE — 60100 BIOPSY OF THYROID: CPT

## 2024-12-23 LAB
LABORATORY COMMENT REPORT: NORMAL
LABORATORY COMMENT REPORT: NORMAL
PATH REPORT.FINAL DX SPEC: NORMAL
PATH REPORT.GROSS SPEC: NORMAL
PATH REPORT.INTRAOP OBS SPEC DOC: NORMAL
PATH REPORT.RELEVANT HX SPEC: NORMAL
PATH REPORT.TOTAL CANCER: NORMAL

## 2025-01-03 ENCOUNTER — APPOINTMENT (OUTPATIENT)
Dept: OTOLARYNGOLOGY | Facility: CLINIC | Age: 55
End: 2025-01-03
Payer: COMMERCIAL

## 2025-01-03 DIAGNOSIS — E04.1 THYROID NODULE: ICD-10-CM

## 2025-01-03 DIAGNOSIS — M54.2 NECK PAIN: ICD-10-CM

## 2025-01-03 DIAGNOSIS — R59.0 CERVICAL LYMPHADENOPATHY: Primary | ICD-10-CM

## 2025-01-03 PROCEDURE — 99213 OFFICE O/P EST LOW 20 MIN: CPT | Performed by: OTOLARYNGOLOGY

## 2025-01-03 NOTE — PROGRESS NOTES
Patient returns.  Seeing her back today follow-up check on her right neck.  She is still having some evident pain and discomfort here.  She did have biopsy done which showed benign follicular nodule of the thyroid.  There was evidence of an associated lymph node but nothing worrisome on the biopsy so not overly worried about the neck itself.  She denies any other new symptoms or signs.  Discomfort is directly at the site of the right lateral thyroid bed.  There have been no significant changes in past medical or past surgical histories except as mentioned.    Exam:  No acute distress.  The external ear structures appear normal. The ear canals patent and the tympanic membranes are intact without evidence of air-fluid levels, retraction, or congenital defects.  Anterior rhinoscopy notes essentially a midline nasal septum. Examination is noted for normal healthy mucosal membranes without any evidence of lesions, polyps, or exudate. The tongue is normally mobile. There are no lesions on the gingiva, buccal, or oral mucosa. There are no oral cavity masses.  The neck is negative for mass lymphadenopathy. The trachea and parotid are clear. The thyroid bed is grossly unremarkable. The salivary gland structures are grossly unremarkable.    Assessment and plan:  Persistent neck discomfort/pain with previous thyroid biopsy.  It is to be noted the discomfort predated the biopsy.  I will send her for dedicated CT neck with contrast and see what that shows and proceed accordingly further recommendation.  All questions were answered in this regard accordingly.

## 2025-01-13 ENCOUNTER — OFFICE VISIT (OUTPATIENT)
Dept: PRIMARY CARE | Facility: CLINIC | Age: 55
End: 2025-01-13
Payer: COMMERCIAL

## 2025-01-13 VITALS
HEART RATE: 80 BPM | DIASTOLIC BLOOD PRESSURE: 80 MMHG | TEMPERATURE: 98.1 F | HEIGHT: 63 IN | SYSTOLIC BLOOD PRESSURE: 110 MMHG | BODY MASS INDEX: 33.4 KG/M2 | OXYGEN SATURATION: 95 % | WEIGHT: 188.5 LBS

## 2025-01-13 DIAGNOSIS — Z23 NEED FOR VACCINATION: ICD-10-CM

## 2025-01-13 DIAGNOSIS — F32.89 OTHER DEPRESSION: Primary | ICD-10-CM

## 2025-01-13 PROBLEM — M72.2 PLANTAR FASCIITIS: Status: ACTIVE | Noted: 2025-01-13

## 2025-01-13 PROBLEM — R42 DIZZINESS AND GIDDINESS: Status: ACTIVE | Noted: 2018-11-14

## 2025-01-13 PROBLEM — R20.1 HYPESTHESIA: Status: ACTIVE | Noted: 2025-01-13

## 2025-01-13 PROBLEM — E66.9 OBESITY WITH BODY MASS INDEX 30 OR GREATER: Status: ACTIVE | Noted: 2025-01-13

## 2025-01-13 PROCEDURE — 3008F BODY MASS INDEX DOCD: CPT | Performed by: PHYSICIAN ASSISTANT

## 2025-01-13 PROCEDURE — 99213 OFFICE O/P EST LOW 20 MIN: CPT | Performed by: PHYSICIAN ASSISTANT

## 2025-01-13 RX ORDER — BUPROPION HYDROCHLORIDE 150 MG/1
150 TABLET ORAL EVERY MORNING
Qty: 30 TABLET | Refills: 1 | Status: SHIPPED | OUTPATIENT
Start: 2025-01-13 | End: 2025-01-13

## 2025-01-13 RX ORDER — BUPROPION HYDROCHLORIDE 150 MG/1
150 TABLET ORAL EVERY MORNING
Qty: 30 TABLET | Refills: 1 | Status: SHIPPED | OUTPATIENT
Start: 2025-01-13 | End: 2025-03-14

## 2025-01-13 ASSESSMENT — PATIENT HEALTH QUESTIONNAIRE - PHQ9
3. TROUBLE FALLING OR STAYING ASLEEP: NEARLY EVERY DAY
5. POOR APPETITE OR OVEREATING: NEARLY EVERY DAY
2. FEELING DOWN, DEPRESSED OR HOPELESS: NEARLY EVERY DAY
SUM OF ALL RESPONSES TO PHQ QUESTIONS 1-9: 21
10. IF YOU CHECKED OFF ANY PROBLEMS, HOW DIFFICULT HAVE THESE PROBLEMS MADE IT FOR YOU TO DO YOUR WORK, TAKE CARE OF THINGS AT HOME, OR GET ALONG WITH OTHER PEOPLE: VERY DIFFICULT
4. FEELING TIRED OR HAVING LITTLE ENERGY: NEARLY EVERY DAY
1. LITTLE INTEREST OR PLEASURE IN DOING THINGS: NEARLY EVERY DAY
6. FEELING BAD ABOUT YOURSELF - OR THAT YOU ARE A FAILURE OR HAVE LET YOURSELF OR YOUR FAMILY DOWN: MORE THAN HALF THE DAYS
8. MOVING OR SPEAKING SO SLOWLY THAT OTHER PEOPLE COULD HAVE NOTICED. OR THE OPPOSITE, BEING SO FIGETY OR RESTLESS THAT YOU HAVE BEEN MOVING AROUND A LOT MORE THAN USUAL: MORE THAN HALF THE DAYS
SUM OF ALL RESPONSES TO PHQ9 QUESTIONS 1 & 2: 6
7. TROUBLE CONCENTRATING ON THINGS, SUCH AS READING THE NEWSPAPER OR WATCHING TELEVISION: MORE THAN HALF THE DAYS
9. THOUGHTS THAT YOU WOULD BE BETTER OFF DEAD, OR OF HURTING YOURSELF: NOT AT ALL

## 2025-01-13 NOTE — PROGRESS NOTES
"Subjective   Patient ID: Zainab Lagunas is a 54 y.o. female who presents for Menopause.    HPI       Pt has been postmenopausal and it has been affecting her moods also when it comes to being intimate she states it hurts even touching.   Sx started about a year ago and has progressively worsened.   NO sex b/c it's painful all over her body.  She has a history of depression and was on Wellbutrin in the past.   Pt admits that she has been depressed, her  has noticed. Isolates self, feels unsatisfied with everything.   She denies any thoughts of hurting herself or others    Quit smoking, feels like she overeating  Tried and failed [place] over the past yr.    PHQ-9 score is 21- moderate to severe depression    Review of Systems  Constitutional: Patient denies any fever, chills, loss of appetite, or unexplained weight loss.  Cardiovascular: Denies any chest pain, shortness of breath with exertion, tachycardia, palpitations.  Respiratory: Patient denies any cough, shortness of breath, or wheezing.  Skin:  Denies any rashes or skin lesions.    Objective   /80   Pulse 80   Temp 36.7 °C (98.1 °F) (Temporal)   Ht 1.6 m (5' 3\")   Wt 85.5 kg (188 lb 8 oz)   SpO2 95%   BMI 33.39 kg/m²     Physical Exam  Gen. appearance: Alert and cooperative, no acute distress, well-developed, well-nourished overweight female.  Neck: Supple and without adenopathy or rigidity.  There is no JVD at 90° and no carotid bruits are noted.  Cardiovascular: Heart has a regular rate and rhythm without murmur or ectopy.  Respiratory: Lungs are clear to auscultation bilaterally with good air exchange.      Assessment/Plan   Diagnoses and all orders for this visit:  Other depression  -     Follow Up In [facility] - PCP - Established; Future  -     buPROPion XL (Wellbutrin XL) 150 mg 24 hr tablet; Take 1 tablet (150 mg) by mouth once daily in the morning. Do not crush, chew, or split.  Patient's depression has " recurred and she is also having physical manifestations.  She has failed a SSRI in the past because it made her feel like a zombie.  She did have success with Wellbutrin so we will try that again at 150 mg daily.  Patient was told to return to the clinic in 6 weeks for reevaluation of symptoms.  PHQ-9 score was 21-moderate to severe depression.  She has no thoughts of hurting himself or others and has declined counseling at this point in time.  Patient was encouraged to exercise, meditate and use mindfulness.  She was also told to call/return to the clinic should she notice any vaginal dryness and we will treat that very specifically with something different    Patient is to return to clinic in 6 weeks for reevaluation of Wellbutrin start.    Patient understands that should they have testing outside   facilities that we may not receive the results and was told to call us if they have not heard from our office within a week after testing.    [facility] uses voice recognition technology for dictations. Sometimes the software misinterprets words. Please take this into account when reading this.

## 2025-01-20 ENCOUNTER — HOSPITAL ENCOUNTER (OUTPATIENT)
Dept: RADIOLOGY | Facility: HOSPITAL | Age: 55
Discharge: HOME | End: 2025-01-20
Payer: COMMERCIAL

## 2025-01-20 PROCEDURE — 70491 CT SOFT TISSUE NECK W/DYE: CPT | Performed by: RADIOLOGY

## 2025-01-20 PROCEDURE — 70491 CT SOFT TISSUE NECK W/DYE: CPT

## 2025-01-20 PROCEDURE — 76937 US GUIDE VASCULAR ACCESS: CPT

## 2025-01-20 PROCEDURE — 2550000001 HC RX 255 CONTRASTS: Performed by: OTOLARYNGOLOGY

## 2025-01-20 RX ADMIN — IOHEXOL 75 ML: 350 INJECTION, SOLUTION INTRAVENOUS at 09:19

## 2025-02-04 NOTE — TELEPHONE ENCOUNTER
[facility] (Physical Medicine and Rehabilitation)  Sports Medicine& Interventional Spine   Follow Up Patient Visit      Chief complaint:   Chief Complaint   Patient presents with    Follow-Up     EMG BUE          HISTORY        HPI  Patient identification: Ta Stokes ,  1965,   With Diagnoses of Spondylosis of cervical spine, Chronic neck pain, Cervical radiculopathy due to degenerative joint disease of spine, Foraminal stenosis of cervical region, Bilateral carpal tunnel syndrome, and Bilateral hand numbness were pertinent to this visit.         Patient presents today for bilateral upper extremity EMG.  Since his last visit he has been seen and evaluated by both his primary care provider as well as orthopedic surgeon Calvin Mathis.  Visit with Dr. Rivera there was concern for moderate to severe foraminal narrowing at C4-C5 with radiculitis symptoms at the left C5-C6 distribution, potential concern for double crush syndrome with possible carpal tunnel testing.  Was recommended bilateral upper extremity EMG for further workup as well as cervical epidural steroid injection for his left-sided C5-C6 radicular pain.    Today patient notes continued severe left-sided neck pain with radiation to the left upper extremity predominantly in the C6 dermatome.  Also reports continued bilateral hand numbness and tingling.          PMHx:   Past Medical History:   Diagnosis Date    Arrhythmia     A-fib    Cancer (HCC) 2024    prostate cancer    Dental disorder     upper/lower dentures    Dyslipidemia     GERD (gastroesophageal reflux disease)     Heart burn     High cholesterol     Hypertension     Indigestion     Snoring        PSHx:   History reviewed. No pertinent surgical history.    Family history   Family History   Problem Relation Age of Onset    Hypertension Mother     Cancer Maternal Grandmother          Medications:   Outpatient Medications Marked as Taking for the 2/3/25 encounter (Office Visit)  Tried to call pt. I did reroute the message with the pictures she sent to you.    with Mark Whitley D.O.   Medication Sig Dispense Refill    diclofenac DR (VOLTAREN) 75 MG Tablet Delayed Response Take 1 Tablet by mouth 2 times a day. 60 Tablet 2    methylPREDNISolone (MEDROL DOSEPAK) 4 MG Tablet Therapy Pack Follow schedule on package instructions. 21 Tablet 0    gabapentin (NEURONTIN) 100 MG Cap Take 1 Capsule by mouth 3 times a day for 30 days. 90 Capsule 0    gabapentin (NEURONTIN) 100 MG Cap Take 1-3 Capsules by mouth at bedtime as needed (pain). 90 Capsule 3    bisoprolol (ZEBETA) 5 MG Tab Take 1 Tablet by mouth every day. 90 Tablet 3    Degarelix Acetate (FIRMAGON SC)       Empagliflozin (JARDIANCE) 10 MG Tab tablet Take 1 Tablet by mouth every day. 90 Tablet 3    atorvastatin (LIPITOR) 10 MG Tab Take 1 Tablet by mouth every evening. 90 Tablet 3    lisinopril (PRINIVIL) 5 MG Tab Take 1 tablet by mouth once daily 90 Tablet 3    apixaban (ELIQUIS) 5mg Tab Take 1 Tablet by mouth 2 times a day. 60 Tablet 11    tamsulosin (FLOMAX) 0.4 MG capsule Take 1 Capsule by mouth at bedtime.      Calcium Carb-Cholecalciferol (CALCIUM 1000 + D) 1000-20 MG-MCG Tab Take  by mouth.          Current Outpatient Medications on File Prior to Visit   Medication Sig Dispense Refill    diclofenac DR (VOLTAREN) 75 MG Tablet Delayed Response Take 1 Tablet by mouth 2 times a day. 60 Tablet 2    methylPREDNISolone (MEDROL DOSEPAK) 4 MG Tablet Therapy Pack Follow schedule on package instructions. 21 Tablet 0    gabapentin (NEURONTIN) 100 MG Cap Take 1 Capsule by mouth 3 times a day for 30 days. 90 Capsule 0    gabapentin (NEURONTIN) 100 MG Cap Take 1-3 Capsules by mouth at bedtime as needed (pain). 90 Capsule 3    bisoprolol (ZEBETA) 5 MG Tab Take 1 Tablet by mouth every day. 90 Tablet 3    Degarelix Acetate (FIRMAGON SC)       Empagliflozin (JARDIANCE) 10 MG Tab tablet Take 1 Tablet by mouth every day. 90 Tablet 3    atorvastatin (LIPITOR) 10 MG Tab Take 1 Tablet by mouth every evening. 90 Tablet 3    lisinopril  (PRINIVIL) 5 MG Tab Take 1 tablet by mouth once daily 90 Tablet 3    apixaban (ELIQUIS) 5mg Tab Take 1 Tablet by mouth 2 times a day. 60 Tablet 11    tamsulosin (FLOMAX) 0.4 MG capsule Take 1 Capsule by mouth at bedtime.      Calcium Carb-Cholecalciferol (CALCIUM 1000 + D) 1000-20 MG-MCG Tab Take  by mouth.      Misc. Devices Misc Overnight oximetry, O2 concentrator to keep nocturnal O2 sats > 92% (Patient not taking: Reported on 2024) 1 Device 0     No current facility-administered medications on file prior to visit.         Allergies:   Allergies   Allergen Reactions    Metformin Diarrhea     diarrhea       Social Hx:   Social History     Socioeconomic History    Marital status: Single     Spouse name: Not on file    Number of children: Not on file    Years of education: Not on file    Highest education level: Associate degree: occupational, technical, or vocational program   Occupational History    Not on file   Tobacco Use    Smoking status: Former     Current packs/day: 0.00     Average packs/day: 1 pack/day for 39.0 years (39.0 ttl pk-yrs)     Types: Cigarettes     Start date: 1979     Quit date: 2018     Years since quittin.0    Smokeless tobacco: Never    Tobacco comments:     1ppd   Vaping Use    Vaping status: Never Used   Substance and Sexual Activity    Alcohol use: Not Currently     Comment: once in awhile, 1 beer    Drug use: No    Sexual activity: Not Currently     Partners: Female   Other Topics Concern    Not on file   Social History Narrative    Not on file     Social Drivers of Health     Financial Resource Strain: High Risk (3/22/2023)    Overall Financial Resource Strain (CARDIA)     Difficulty of Paying Living Expenses: Hard   Food Insecurity: Food Insecurity Present (3/22/2023)    Hunger Vital Sign     Worried About Running Out of Food in the Last Year: Sometimes true     Ran Out of Food in the Last Year: Never true   Transportation Needs: No Transportation Needs (3/22/2023)  "   PRAPARE - Transportation     Lack of Transportation (Medical): No     Lack of Transportation (Non-Medical): No   Physical Activity: Sufficiently Active (3/22/2023)    Exercise Vital Sign     Days of Exercise per Week: 7 days     Minutes of Exercise per Session: 150+ min   Stress: Stress Concern Present (3/22/2023)    [employer] [place] [employer] - Occupational Stress Questionnaire     Feeling of Stress : To some extent   Social Connections: Moderately Isolated (3/22/2023)    Social Connection and Isolation Panel [NHANES]     Frequency of Communication with Friends and Family: More than three times a week     Frequency of Social Gatherings with Friends and Family: More than three times a week     Attends Mandaeism Services: Never     Active Member of Clubs or Organizations: No     Attends Club or Organization Meetings: Never     Marital Status: Living with partner   Intimate Partner Violence: Not on file   Housing Stability: Low Risk  (3/22/2023)    Housing Stability Vital Sign     Unable to Pay for Housing in the Last Year: No     Number of Places Lived in the Last Year: 1     Unstable Housing in the Last Year: No         EXAMINATION     Physical Exam:   Vitals: BP (!) 140/70 (BP Location: Right arm, Patient Position: Sitting, BP Cuff Size: Adult)   Pulse (!) 101   Temp 36.3 °C (97.3 °F) (Temporal)   Ht 1.702 m (5' 7\")   Wt 111 kg (244 lb)   SpO2 95%     Constitutional:   Body Habitus: Body mass index is 38.22 kg/m².  Cooperation: Fully cooperates with exam  Appearance: Well-groomed no disheveled    Respiratory-  breathing comfortable on room air, no audible wheezing  Cardiovascular- capillary refills less than 2 seconds. No lower extremity edema is noted.   Psychiatric- alert and oriented ×3. Normal affect.    MSK and Neuro:   Cervical spine   Inspection: No deformities of the skin over the cervical spine. No rashes or lesions.  full  A/P ROM in all directions, with  pain    Spurling’s sign: " negative bilaterally  No signs of muscular atrophy in bilateral upper extremities   No tenderness to palpation of the cervical facets     Neuro   Key points for the international standards for neurological classification of spinal cord injury (ISNCSCI) to light touch.   Dermatome R L   C4 2 2   C5 2 2   C6 1 1   C7 1 1   C8 1 1   T1 2 2      Motor Exam Upper Extremities   ? Myotome R L   Shoulder flexion C5 5 5   Elbow flexion C5 5 5   Wrist extension C6 4 4   Elbow extension C7 4 4   Finger flexion C8 5 5   Finger abduction T1 5 5      Reflexes  ?   R L   Biceps   2+ 2+   Brachioradialis   2+ 2+                MEDICAL DECISION MAKING    DATA    Labs:   Lab Results   Component Value Date/Time    SODIUM 140 01/15/2025 01:01 PM    POTASSIUM 4.6 01/15/2025 01:01 PM    CHLORIDE 108 01/15/2025 01:01 PM    CO2 19 (L) 01/15/2025 01:01 PM    GLUCOSE 225 (H) 01/15/2025 01:01 PM    BUN 25 (H) 01/15/2025 01:01 PM    CREATININE 0.98 01/15/2025 01:01 PM        Lab Results   Component Value Date/Time    PROTHROMBTM 13.2 02/01/2019 07:07 PM    INR 0.99 02/01/2019 07:07 PM        Lab Results   Component Value Date/Time    WBC 5.7 01/14/2025 10:00 AM    RBC 5.23 01/14/2025 10:00 AM    HEMOGLOBIN 16.3 01/14/2025 10:00 AM    HEMATOCRIT 49.0 01/14/2025 10:00 AM    MCV 93.7 01/14/2025 10:00 AM    MCH 31.2 01/14/2025 10:00 AM    MCHC 33.3 01/14/2025 10:00 AM    MPV 9.4 01/14/2025 10:00 AM    NEUTSPOLYS 71.30 01/14/2025 10:00 AM    LYMPHOCYTES 13.30 (L) 01/14/2025 10:00 AM    MONOCYTES 8.90 01/14/2025 10:00 AM    EOSINOPHILS 5.10 01/14/2025 10:00 AM    BASOPHILS 0.50 01/14/2025 10:00 AM        Lab Results   Component Value Date/Time    HBA1C 6.6 (A) 01/22/2025 02:56 PM          Imaging:   I personally reviewed following images      MRI of the cervical spine dated 12/16/2024 showed severe right foraminal narrowing at C4-C5 and bilateral foraminal narrowing at C5-C6.         I reviewed the following radiology reports                    Results for orders placed during the hospital encounter of 25    MR-CERVICAL SPINE-W/O    Impression  Stable appearance of moderate canal narrowing at C4-5 and C5-6.    Stable appearance of severe right foraminal narrowing at C4-5.    Stable appearance of bilateral moderate foraminal narrowing at C5-6.    Results for orders placed during the hospital encounter of 24    MR-CERVICAL SPINE-WITH & W/O    Impression  1.  Moderate canal narrowing at C4-5 and C5-6.    2.  Severe right foraminal narrowing at C4-5.    3.  Moderate bilateral foraminal narrowing at C5-6.                                                                               Results for orders placed during the hospital encounter of 19    CT-ABDOMEN-PELVIS WITH    Impression  1.  Asymmetric thickening of the right lower anterior intercostal muscles with a small amount of fluid seen extending between the deep and superficial layers of the right lower chest and anterior lateral abdominal wall muscles likely representing  hematoma. A definite associated rib fracture is not seen.    2.  Fatty liver.    3.  Small left lobe hepatic cyst or hemangioma.    4.  No evidence of bowel obstruction or focal inflammatory change within the abdomen or pelvis.                     Results for orders placed in visit on 25    DX-CERVICAL SPINE-4+ VIEWS                                   Results for orders placed during the hospital encounter of 25    DX-SHOULDER 2+ LEFT    Impression  No acute osseous abnormality.              DIAGNOSIS   Visit Diagnoses     ICD-10-CM   1. Spondylosis of cervical spine  M47.812   2. Chronic neck pain  M54.2    G89.29   3. Cervical radiculopathy due to degenerative joint disease of spine  M47.22   4. Foraminal stenosis of cervical region  M48.02   5. Bilateral carpal tunnel syndrome  G56.03   6. Bilateral hand numbness  R20.0         ASSESSMENT and PLAN:     Ta CAAL 1965 male presents today for  bilateral upper extremity EMG.  Patient was previously seen and evaluated by Dr. Rivera.  Patient continues to have severe radicular type pain throughout the left upper extremity.  See attached EMG report for specific findings.  EMG did indicate concern for acute on chronic C5-C6 left-sided radiculopathy as well as bilateral carpal tunnel syndrome, moderate at the right and mild on the left.     Regarding his continued left upper extremity radicular symptoms.  He has concurrent findings of foraminal narrowing on MRI as well as acute radiculopathy on EMG.  Would recommend symptomatic treatment with a left C7-T1 interlaminar epidural steroid injection. The risks benefits and alternatives to this procedure were discussed and the patient wishes to proceed with the procedure. Risks include but are not limited to damage to surrounding structures, infection, bleeding, worsening of pain which can be permanent, weakness which can be permanent. Benefits include pain relief, improved function. Alternatives includes not doing the procedure.      Conservative treatments including the past two months within the past six months  NSAIDs: yes  Acetaminophen: yes  Home exercise program or physical therapy: Patient has completed a provider directed home exercise program for at least 6 weeks.  Activity modification difficulty lifting with the left arm, difficulty gripping objects with the left hand    Did recommend continued use of bracing for bilateral carpal tunnel syndrome.  Can consider injections into bilateral wrists for symptomatic improvement at follow-up.      Ta was seen today for follow-up.    Diagnoses and all orders for this visit:    Spondylosis of cervical spine    Chronic neck pain    Cervical radiculopathy due to degenerative joint disease of spine  -     Cancel: Referral to Physical Medicine Rehab  -     Referral to Physical Medicine Rehab    Foraminal stenosis of cervical region  -     Cancel: Referral to Physical  Medicine Rehab  -     Referral to Physical Medicine Rehab    Bilateral carpal tunnel syndrome    Bilateral hand numbness            Follow up: 2 weeks after the procedure    Thank you for allowing me to participate in the care of this patient. If you have any questions please not hesitate to contact me.             Please note that this dictation was created using voice recognition software. I have made every reasonable attempt to correct obvious errors but there may be errors of grammar and content that I may have overlooked prior to finalization of this note.    Mark Whitley DO  Physical Medicine and Rehabilitation  Sports Medicine and Spine  [facility]

## 2025-02-21 ENCOUNTER — APPOINTMENT (OUTPATIENT)
Dept: RADIOLOGY | Facility: HOSPITAL | Age: 55
End: 2025-02-21
Payer: COMMERCIAL

## 2025-02-26 ENCOUNTER — APPOINTMENT (OUTPATIENT)
Dept: PRIMARY CARE | Facility: CLINIC | Age: 55
End: 2025-02-26
Payer: COMMERCIAL

## 2025-02-26 VITALS
HEIGHT: 63 IN | SYSTOLIC BLOOD PRESSURE: 122 MMHG | HEART RATE: 85 BPM | TEMPERATURE: 98.2 F | OXYGEN SATURATION: 96 % | WEIGHT: 192.3 LBS | DIASTOLIC BLOOD PRESSURE: 85 MMHG | BODY MASS INDEX: 34.07 KG/M2

## 2025-02-26 DIAGNOSIS — E66.09 CLASS 1 OBESITY DUE TO EXCESS CALORIES WITHOUT SERIOUS COMORBIDITY WITH BODY MASS INDEX (BMI) OF 34.0 TO 34.9 IN ADULT: ICD-10-CM

## 2025-02-26 DIAGNOSIS — E66.811 CLASS 1 OBESITY DUE TO EXCESS CALORIES WITHOUT SERIOUS COMORBIDITY WITH BODY MASS INDEX (BMI) OF 34.0 TO 34.9 IN ADULT: ICD-10-CM

## 2025-02-26 DIAGNOSIS — F32.89 OTHER DEPRESSION: Primary | ICD-10-CM

## 2025-02-26 PROCEDURE — 3008F BODY MASS INDEX DOCD: CPT | Performed by: PHYSICIAN ASSISTANT

## 2025-02-26 PROCEDURE — 99213 OFFICE O/P EST LOW 20 MIN: CPT | Performed by: PHYSICIAN ASSISTANT

## 2025-02-26 RX ORDER — BUPROPION HYDROCHLORIDE 150 MG/1
150 TABLET ORAL EVERY MORNING
Qty: 90 TABLET | Refills: 0 | Status: SHIPPED | OUTPATIENT
Start: 2025-02-26 | End: 2025-05-27

## 2025-02-26 ASSESSMENT — PATIENT HEALTH QUESTIONNAIRE - PHQ9
SUM OF ALL RESPONSES TO PHQ9 QUESTIONS 1 AND 2: 1
2. FEELING DOWN, DEPRESSED OR HOPELESS: SEVERAL DAYS
10. IF YOU CHECKED OFF ANY PROBLEMS, HOW DIFFICULT HAVE THESE PROBLEMS MADE IT FOR YOU TO DO YOUR WORK, TAKE CARE OF THINGS AT HOME, OR GET ALONG WITH OTHER PEOPLE: NOT DIFFICULT AT ALL
1. LITTLE INTEREST OR PLEASURE IN DOING THINGS: NOT AT ALL

## 2025-02-26 NOTE — PROGRESS NOTES
Subjective   Patient ID: Zainab Lagunas is a 54 y.o. female who presents for Follow-up.    HPI       Here to follow up on the Wellbutrin XL 150mg QAM for depression.   Patient tolerates well no side effects.  Initmacy: not changed, still lacking but she is considering that being related to menopausal changes  Depression sx: improved, mood is better  Motivation: better  Self isolation: better  Gained 4#, this is the highest she has ever been.  Not eating as much, increased walking, watching diet so she is surprised with the weight gain  PHQ-9 today is 1      Patient is wondering if she can restart on Adipex to help with weight loss.  Her last controlled substance contract and UDS was May 2024.  She has been on Adipex several times in the past with success.  The lowest weight that she had gotten to in the past was in the 170s.  She is looking to lose 20 pounds.      LOV:  No recent BW  DEXA: 04/2024  Mammo: 02/2024        Pt has been postmenopausal and it has been affecting her moods also when it comes to being intimate she states it hurts even touching.   Sx started about a year ago and has progressively worsened.   NO sex b/c it's painful all over her body.  She has a history of depression and was on Wellbutrin in the past.   Pt admits that she has been depressed, her  has noticed. Isolates self, feels unsatisfied with everything.   She denies any thoughts of hurting herself or others    Quit smoking, feels like she overeating  Tried and failed [place] over the past yr.     PHQ-9 score is 21- moderate to severe depression      Review of Systems  Constitutional: Patient denies any fever, chills, loss of appetite, or unexplained weight loss.  Cardiovascular: Denies any chest pain, shortness of breath with exertion, tachycardia, palpitations.  Respiratory: Patient denies any cough, shortness of breath, or wheezing.  Skin:  Denies any rashes or skin lesions.    Objective   /85   Pulse 85   Temp  "36.8 °C (98.2 °F) (Temporal)   Ht 1.6 m (5' 3\")   Wt 87.2 kg (192 lb 4.8 oz)   SpO2 96%   BMI 34.06 kg/m²     Physical Exam  Gen. appearance: Alert and cooperative, no acute distress, well-developed, well-nourished obese female.  Neck: Supple and without adenopathy or rigidity.  There is no JVD at 90° and no carotid bruits are noted.  Cardiovascular: Heart has a regular rate and rhythm without murmur or ectopy.  Respiratory: Lungs are clear to auscultation bilaterally with good air exchange.      Assessment/Plan   Diagnoses and all orders for this visit:  1. Other depression (Primary)  Patient's PHQ-9 has decreased down to 1.  Her mood is much improved the only thing that she did not see an improvement in was her desire for intimacy but she thinks that might be related to hormones.  Patient will continue with current dose of Wellbutrin 150 every morning and we will follow-up in 3 months.  She has no thoughts of hurting herself or others.    - Follow Up In [facility] - PCP - Established  - buPROPion XL (Wellbutrin XL) 150 mg 24 hr tablet; Take 1 tablet (150 mg) by mouth once daily in the morning. Do not crush, chew, or split.  Dispense: 90 tablet; Refill: 0    2. Class 1 obesity due to excess calories without serious comorbidity with body mass index (BMI) of 34.0 to 34.9 in adult  Patient's current BMI is 34.  She is 192 pounds.  This is the heaviest she has ever been.  Patient is interested in restarting on Adipex.  The last time she had a prescribed was last August and she got down to the 170s.      - Follow Up In [facility] - PCP - Established; Future     Patient is to return to the clinic in 4 weeks for reevaluation of weight to start the Apipex.  She understands that she should lose at least 4 to 8 pounds within the next 4 weeks.    Patient understands that should they have testing outside   facilities that we may not receive the results and was told to call us if they have not heard " from our office within a week after testing.    [facility] uses voice recognition technology for dictations. Sometimes the software misinterprets words. Please take this into account when reading this.

## 2025-03-18 NOTE — PROGRESS NOTES
"Zainab Lagunas female   1970 55 y.o.   [id number]      Chief Complaint  Annual mammogram and exam, high risk surveillance care    History Of Present Illness  Zainab \"Lelia Lagunas is a very pleasant 55 year old  woman [facility]. She has family history of breast cancer in her mother, age 50 and sister, age 45. Neither had genetic testing. 3/21/2022 Michelle underwent genetic testing, 36 gene panel, negative. She denies breast surgery or biopsy. She presents today for annual mammogram and exam. She denies any new masses or lumps. She declines endocrine therapy at this time.      BREAST IMAGIN2024 Bilateral Full MRI, indicates BI-RADS Category 1. 2024 Bilateral screening mammogram, indicates BI-RADS Category 1.      FEMALE HISTORY: menarche age 15, , first birth age 22,  x 1 month, OCP's x 1 year, menopause age 50 (FSH & LH 2020 demonstrating menopause status), endometrial ablation in 2009 due to menorrhagia, no HRT, heterogeneously dense tissue     FAMILY CANCER HISTORY:  Mother: Breast cancer, 50  Sister: Breast cancer, 45      Surgical History  She has a past surgical history that includes Hysteroscopy (02/15/2016); Tubal ligation (02/15/2016); and Other surgical history (2020).     Social History  She reports that she has been smoking cigarettes. She has a 25 pack-year smoking history. She has never used smokeless tobacco. She reports current alcohol use. She reports that she does not use drugs.    Family History  Family History   Problem Relation Name Age of Onset    Breast cancer Mother Yanni Francois     Cancer Mother Yanni Francois     Thyroid disease Sister Melody     Thyroid disease Father's Sister Mei     Thyroid disease Mother's Sister wesly         Allergies  Patient has no known allergies.    Medications  Current Outpatient Medications   Medication Instructions    albuterol 90 " mcg/actuation inhaler 1 puff, inhalation, 3 times daily RT    ALPRAZolam (XANAX) 0.25 mg, Nightly PRN    ascorbic acid (Vitamin C) 500 mg tablet 1 tablet, Daily    budesonide-formoteroL (Symbicort) 160-4.5 mcg/actuation inhaler 2 puffs, inhalation, Every 12 hours    buPROPion XL (WELLBUTRIN XL) 150 mg, oral, Every morning, Do not crush, chew, or split.    cetirizine (ZYRTEC) 10 mg, Daily    cetirizine (ZYRTEC) 10 mg, oral, Daily    cholecalciferol (Vitamin D-3) 5,000 Units tablet Daily    cyanocobalamin (Vitamin B-12) 1,000 mcg tablet 1 tablet, Daily    cyclobenzaprine (FLEXERIL) 10 mg, oral, Nightly PRN    fish oil concentrate (Omega-3) 120-180 mg capsule 1 g, Daily    fluticasone (Flonase) 50 mcg/actuation nasal spray 1 spray, Each Nostril, Daily, Shake gently. Before first use, prime pump. After use, clean tip and replace cap.    loratadine 10 mg capsule Take by mouth.    magnesium oxide (Mag-Ox) 400 mg (241.3 mg magnesium) tablet 1 tablet, 2 times daily    methocarbamol (ROBAXIN) 500 mg, oral, 4 times daily PRN    multivitamin tablet 1 tablet, Daily    naproxen (NAPROSYN) 500 mg, oral, Every 12 hours    phentermine (ADIPEX-P) 37.5 mg, oral, Daily before breakfast    triamcinolone (Kenalog) 0.1 % cream Topical, 2 times daily, Apply to affected area 1-2 times daily as needed. Avoid face and groin.    zinc gluconate 50 mg tablet 1 tablet, Daily         REVIEW OF SYSTEMS    Constitutional:  Negative for appetite change, fatigue, fever and unexpected weight change.   HENT:  Negative for ear pain, hearing loss, nosebleeds, sore throat and trouble swallowing.    Eyes:  Negative for discharge, itching and visual disturbance.   Respiratory:  Negative for cough, chest tightness and shortness of breath.    Cardiovascular:  Negative for chest pain, palpitations and leg swelling.   Breast: as indicated in HPI  Gastrointestinal:  Negative for abdominal pain, constipation, diarrhea and nausea.   Endocrine: Negative for cold  intolerance and heat intolerance.   Genitourinary:  Negative for dysuria, frequency, hematuria, pelvic pain and vaginal bleeding.   Musculoskeletal:  Negative for arthralgias, back pain, gait problem, joint swelling and myalgias.   Skin:  Negative for color change and rash.   Allergic/Immunologic: Negative for environmental allergies and food allergies.   Neurological:  Negative for dizziness, tremors, speech difficulty, weakness, numbness and headaches.   Hematological:  Does not bruise/bleed easily.   Psychiatric/Behavioral:  Negative for agitation, dysphoric mood and sleep disturbance. The patient is not nervous/anxious.         Past Medical History  She has a past medical history of Anxiety, Anxiety disorder, unspecified (12/29/2022), Arthritis, BRCA1 negative (april 2023), BRCA2 gene mutation negative (april 2023), Contact with and (suspected) exposure to covid-19 (11/29/2022), Fatigue, Fatty (change of) liver, not elsewhere classified (07/23/2021), Hypoesthesia of skin, Personal history of other diseases of the musculoskeletal system and connective tissue, Personal history of other diseases of the respiratory system (11/17/2020), Pneumonia, unspecified organism (01/07/2020), and Sleep difficulties.     Physical Exam  Patient is alert and oriented x3 and in a relaxed and appropriate mood. Her gait is steady and hand grasps are equal. Sclera is clear. The breasts are nearly symmetrical. The tissue is soft without palpable abnormalities, discrete nodules or masses. The skin and nipples appear normal. There is no cervical, supraclavicular or axillary lymphadenopathy. Heart rate and rhythm normal, S1 and S2 appreciated. The lungs are clear to auscultation bilaterally. Abdomen is soft and non-tender.     Physical Exam     Last Recorded Vitals  Vitals:    04/01/25 1404   BP: 110/72   Pulse: 75   Resp: 16       Relevant Results   Time was spent discussing digital images of the radiology testing with the patient,  results pending    Risk Profile      Assessment/Plan   High risk surveillance care, normal clinical exam, screening mammogram, BRCA negative, family history of breast cancer, heterogeneously dense tissue     Plan: Return in one year for bilateral screening mammogram and office visit. Full MRI in November 2025. Discussed Tamoxifen versus Raloxifene for risk reduction, still declines at this time. She will contact office if she decides otherwise.     Patient Discussion/Summary  Your clinical examination is  normal. Please return in one year for bilateral screening mammogram and office visit or sooner if you have any problems or concerns.     High risk breast surveillance care plan:  Yearly mammogram with digital breast tomosynthesis  Twice yearly clinical breast examinations  Breast MRI - Full MRI in November 2025  Monthly self breast examinations  Vitamin D3 2000 IU/daily (over the counter)  Exercise 3-4 times per week for 45-60 minutes  Limit alcohol to 3-4 drinks per week   Eat a healthy low-fat diet with lots of fruits and vegetables  Risk model indicates you are eligible for endocrine therapy with Tamoxifen, Raloxifene or Exemestane. Endocrine therapy reduces lifetime risk of breast cancer by 50% when taken for 5 years.    You can see your health information, review clinical summaries from office visits & test results online when you follow your health with MY  Chart, a personal health record. To sign up go to [email or web address]. If you need assistance with signing up or trouble getting into your account call [employer] Patient Line 24/7 at [phone].    My office phone number is [phone] if you need to get in touch with me or have additional questions or concerns. Thank you for choosing [facility] and trusting me as your healthcare provider. I look forward to seeing you again at your next office visit. I am honored to be a provider on your health care team and I remain dedicated to  helping you achieve your health goals.      Romina Johnson, APRN-CNP

## 2025-03-31 ENCOUNTER — APPOINTMENT (OUTPATIENT)
Dept: PRIMARY CARE | Facility: CLINIC | Age: 55
End: 2025-03-31
Payer: COMMERCIAL

## 2025-03-31 VITALS
OXYGEN SATURATION: 94 % | WEIGHT: 188 LBS | HEART RATE: 80 BPM | HEIGHT: 63 IN | TEMPERATURE: 97.7 F | BODY MASS INDEX: 33.31 KG/M2 | SYSTOLIC BLOOD PRESSURE: 108 MMHG | DIASTOLIC BLOOD PRESSURE: 64 MMHG

## 2025-03-31 DIAGNOSIS — Z79.899 HIGH RISK MEDICATION USE: ICD-10-CM

## 2025-03-31 DIAGNOSIS — E66.09 CLASS 1 OBESITY DUE TO EXCESS CALORIES WITHOUT SERIOUS COMORBIDITY WITH BODY MASS INDEX (BMI) OF 34.0 TO 34.9 IN ADULT: Primary | ICD-10-CM

## 2025-03-31 DIAGNOSIS — S39.012D STRAIN OF LUMBAR REGION, SUBSEQUENT ENCOUNTER: ICD-10-CM

## 2025-03-31 DIAGNOSIS — E66.811 CLASS 1 OBESITY DUE TO EXCESS CALORIES WITHOUT SERIOUS COMORBIDITY WITH BODY MASS INDEX (BMI) OF 34.0 TO 34.9 IN ADULT: Primary | ICD-10-CM

## 2025-03-31 PROCEDURE — 99213 OFFICE O/P EST LOW 20 MIN: CPT | Performed by: PHYSICIAN ASSISTANT

## 2025-03-31 PROCEDURE — 3008F BODY MASS INDEX DOCD: CPT | Performed by: PHYSICIAN ASSISTANT

## 2025-03-31 RX ORDER — NAPROXEN 500 MG/1
500 TABLET ORAL EVERY 12 HOURS
Qty: 90 TABLET | Refills: 0 | Status: SHIPPED | OUTPATIENT
Start: 2025-03-31

## 2025-03-31 RX ORDER — PHENTERMINE HYDROCHLORIDE 37.5 MG/1
37.5 TABLET ORAL
Qty: 30 TABLET | Refills: 0 | Status: SHIPPED | OUTPATIENT
Start: 2025-03-31 | End: 2025-04-30

## 2025-03-31 NOTE — PROGRESS NOTES
Subjective   Patient ID: Zainab Lagunas is a 55 y.o. female who presents for Follow-up.    HPI       Here to start Adipex for weight loss management.     Patient has lost 5 lbs since last OV.       patient is wondering if she can restart on Adipex to help with weight loss.  Her last controlled substance contract and UDS was May 2024.  She has been on Adipex several times in the past with success.  The lowest weight that she had gotten to in the past was in the 170s.  She is looking to lose 20 pounds this round of Adipex.    Current BMI: 33.3  Goal BMI is 27  She has to lose approx 30 # to get near goal  Pt is using jslyhl yani, weigh self weekly, tracks meals and water.   Stopped snacking    OARRS:  No data recorded  I have personally reviewed the OARRS report for Zainab Lagunas. I have considered the risks of abuse, dependence, addiction and diversion and I believe that it is clinically appropriate for Zainab Lagunas to be prescribed this medication    Is the patient prescribed a combination of a benzodiazepine and opioid?  No    Last Urine Drug Screen / ordered today: Yes  Recent Results (from the past 8760 hours)   Amphetamine Confirm, Urine    Collection Time: 05/08/24  9:44 AM   Result Value Ref Range    Methamphetamine Quant, Ur <200 ng/mL    MDA, Urine <200 ng/mL    MDEA, Urine <200 ng/mL    Phentermine,Urine >5000 ng/mL    Amphetamines,Urine <50 ng/mL    MDMA, Urine <200 ng/mL   Drug Screen, Urine With Reflex to Confirmation    Collection Time: 05/08/24  9:44 AM   Result Value Ref Range    Amphetamine Screen, Urine Presumptive Positive (A) Presumptive Negative    Barbiturate Screen, Urine Presumptive Negative Presumptive Negative    Benzodiazepines Screen, Urine Presumptive Negative Presumptive Negative    Cannabinoid Screen, Urine Presumptive Negative Presumptive Negative    Cocaine Metabolite Screen, Urine Presumptive Negative Presumptive Negative    Fentanyl Screen, Urine Presumptive Negative  Presumptive Negative    Opiate Screen, Urine Presumptive Negative Presumptive Negative    Oxycodone Screen, Urine Presumptive Negative Presumptive Negative    PCP Screen, Urine Presumptive Negative Presumptive Negative    Methadone Screen, Urine Presumptive Negative Presumptive Negative     Results are as expected.         Controlled Substance Agreement:  Date of the Last Agreement: today 3/31  Reviewed Controlled Substance Agreement including but not limited to the benefits, risks, and alternatives to treatment with a Controlled Substance medication(s).    Anorexiants:   What is the patient's goal of therapy? To weigh less  Is this being achieved with current treatment? yes    I have assessed the patient's continuing efforts to lose weight., I have assessed the patient's dedication to the treatment program and the response to treatment., and I have assessed the presence or absence of contraindications, adverse effects, and indicators of possible substance abuse that would necessitate cessation of treatment utilizing controlled substance.    Patient has demonstrated continued efforts to lose weight, is dedicated to the treatment program and the response to treatment. and I have assessed for the presence or absence of contraindications, adverse effects, and indicators of possible substance abuse that would necessitate cessation of treatment utilizing controlled substance.    Activities of Daily Living:   Is your overall impression that this patient is benefiting (symptom reduction outweighs side effects) from anorexiants therapy? Yes     1. Physical Functioning: Same  2. Family Relationship: Same  3. Social Relationship: Same  4. Mood: Same  5. Sleep Patterns: Same  6. Overall Function: Same      Patient uses Naproxen for her lumbar strain and needs a refill of her Naproxen.  Symptoms are stable.     Review of Systems  Constitutional: Patient denies any fever, chills, loss of appetite, or unexplained weight  "loss.  Cardiovascular: Patient denies any chest pain, shortness of breath with exertion, tachycardia, palpitations, orthopnea, or paroxysmal nocturnal dyspnea.  Respiratory: Patient denies any cough, shortness breath, or wheezing.  Gastrointestinal patient denies any nausea, vomiting, diarrhea, constipation, melena, hematochezia, or reflux symptoms  Skin: Denies any rashes or skin lesions   Neurology: Patient denies any new motor or sensory losses.  Denies any numbness, tingling, weakness, and incoordination of the extremities.  Patient also denies any tremor, seizures, or gait instability.  Endocrinology: Denies any polyuria, polydipsia, polyphagia, or heat/cold intolerance.    Objective   /64 (BP Location: Right arm, Patient Position: Sitting, BP Cuff Size: Adult long)   Pulse 80   Temp 36.5 °C (97.7 °F) (Temporal)   Ht 1.6 m (5' 3\")   Wt 85.3 kg (188 lb)   SpO2 94%   BMI 33.30 kg/m²     Physical Exam  Gen. appearance: Alert and cooperative, no acute distress, well-developed, well-nourished obese female.  Neck: Supple and without adenopathy or rigidity.  There is no JVD at 90° and no carotid bruits are noted.  Cardiovascular: Heart has a regular rate and rhythm without murmur or ectopy.  Respiratory: Lungs are clear to auscultation bilaterally with good air exchange.    Assessment/Plan   Diagnoses and all orders for this visit:  Class 1 obesity due to excess calories without serious comorbidity with body mass index (BMI) of 34.0 to 34.9 in adult  -     [facility] - PCP - Established  -     phentermine (Adipex-P) 37.5 mg tablet; Take 1 tablet (37.5 mg) by mouth once daily in the morning. Take before meals.  -     Follow [facility] In [facility] - PCP - Established; Future    Lost 5# since her last visit  Current BMI: 33.3  Goal BMI is 27  She has to lose approx 30 # to get near goal  Pt is using [employer] yani, weigh self weekly, tracks meals and water.   Stopped snacking, increased " activity.   We discussed the risks/benefits/side effects of Adipex at length again. Sheunderstands similar medications have had adverse effects on heart valves in the past and there is no guarantee that Adipex cannot have similar side effects. We discussed that the medication is potentially abusable and potentially addictive. We again discussed that she/he can only continue on the medication for 3 months before needing a 6 month drug-free interval. We discussed that we will need to discontinue the medication should she/he fail to lose weight.  I see no signs of substance abuse today  UDS Pending  CSC signed today    High risk medication use  -     Drug Screen, Urine With Reflex to Confirmation  We will call her with results of drug screen if it was abnormal.    Strain of lumbar region, subsequent encounter  -     naproxen (Naprosyn) 500 mg tablet; Take 1 tablet (500 mg) by mouth every 12 hours.  Symptoms are stable.  She will continue current dosing.    Patient is to return to clinic in exactly 4 weeks for reevaluation of weight and a refill of her Adipex.    Patient understands that should they have testing outside   facilities that we may not receive the results and was told to call us if they have not heard from our office within a week after testing.    [facility] uses voice recognition technology for dictations. Sometimes the software misinterprets words. Please take this into account when reading this.

## 2025-04-01 ENCOUNTER — OFFICE VISIT (OUTPATIENT)
Dept: SURGICAL ONCOLOGY | Facility: HOSPITAL | Age: 55
End: 2025-04-01
Payer: COMMERCIAL

## 2025-04-01 ENCOUNTER — HOSPITAL ENCOUNTER (OUTPATIENT)
Dept: RADIOLOGY | Facility: HOSPITAL | Age: 55
Discharge: HOME | End: 2025-04-01
Payer: COMMERCIAL

## 2025-04-01 VITALS
HEART RATE: 75 BPM | RESPIRATION RATE: 16 BRPM | SYSTOLIC BLOOD PRESSURE: 110 MMHG | WEIGHT: 188 LBS | BODY MASS INDEX: 33.31 KG/M2 | HEIGHT: 63 IN | DIASTOLIC BLOOD PRESSURE: 72 MMHG

## 2025-04-01 DIAGNOSIS — Z12.39 BREAST CANCER SCREENING, HIGH RISK PATIENT: ICD-10-CM

## 2025-04-01 DIAGNOSIS — R92.30 DENSE BREAST TISSUE: Primary | ICD-10-CM

## 2025-04-01 LAB
AMPHETAMINES UR QL: NEGATIVE NG/ML
BARBITURATES UR QL: NEGATIVE NG/ML
BENZODIAZ UR QL: NEGATIVE NG/ML
BZE UR QL: NEGATIVE NG/ML
CREAT UR-MCNC: 130.2 MG/DL
METHADONE UR QL: NEGATIVE NG/ML
OPIATES UR QL: NEGATIVE NG/ML
OXIDANTS UR QL: NEGATIVE MCG/ML
OXYCODONE UR QL: NEGATIVE NG/ML
PCP UR QL: NEGATIVE NG/ML
PH UR: 6.5 [PH] (ref 4.5–9)
QUEST NOTES AND COMMENTS: NORMAL
THC UR QL: NEGATIVE NG/ML

## 2025-04-01 PROCEDURE — 77067 SCR MAMMO BI INCL CAD: CPT

## 2025-04-01 PROCEDURE — 3008F BODY MASS INDEX DOCD: CPT | Performed by: NURSE PRACTITIONER

## 2025-04-01 PROCEDURE — 99214 OFFICE O/P EST MOD 30 MIN: CPT | Performed by: NURSE PRACTITIONER

## 2025-04-01 PROCEDURE — 77063 BREAST TOMOSYNTHESIS BI: CPT | Performed by: STUDENT IN AN ORGANIZED HEALTH CARE EDUCATION/TRAINING PROGRAM

## 2025-04-01 PROCEDURE — 77067 SCR MAMMO BI INCL CAD: CPT | Performed by: STUDENT IN AN ORGANIZED HEALTH CARE EDUCATION/TRAINING PROGRAM

## 2025-04-01 NOTE — PATIENT INSTRUCTIONS
Your clinical examination is  normal. Please return in one year for bilateral screening mammogram and office visit or sooner if you have any problems or concerns.     High risk breast surveillance care plan:  Yearly mammogram with digital breast tomosynthesis  Twice yearly clinical breast examinations  Breast MRI - Full MRI in November 2025  Monthly self breast examinations  Vitamin D3 2000 IU/daily (over the counter)  Exercise 3-4 times per week for 45-60 minutes  Limit alcohol to 3-4 drinks per week   Eat a healthy low-fat diet with lots of fruits and vegetables  Risk model indicates you are eligible for endocrine therapy with Tamoxifen, Raloxifene or Exemestane. Endocrine therapy reduces lifetime risk of breast cancer by 50% when taken for 5 years.    You can see your health information, review clinical summaries from office visits & test results online when you follow your health with MY  Chart, a personal health record. To sign up go to [email or web address]/"[email or web address]". If you need assistance with signing up or trouble getting into your account call [employer] Patient Line 24/7 at [phone].    My office phone number is [phone] if you need to get in touch with me or have additional questions or concerns. Thank you for choosing [facility] and trusting me as your healthcare provider. I look forward to seeing you again at your next office visit. I am honored to be a provider on your health care team and I remain dedicated to helping you achieve your health goals.

## 2025-04-28 ENCOUNTER — APPOINTMENT (OUTPATIENT)
Dept: PRIMARY CARE | Facility: CLINIC | Age: 55
End: 2025-04-28
Payer: COMMERCIAL

## 2025-04-28 VITALS
DIASTOLIC BLOOD PRESSURE: 84 MMHG | HEART RATE: 88 BPM | SYSTOLIC BLOOD PRESSURE: 126 MMHG | HEIGHT: 63 IN | OXYGEN SATURATION: 96 % | TEMPERATURE: 97.7 F | BODY MASS INDEX: 32.25 KG/M2 | WEIGHT: 182 LBS

## 2025-04-28 DIAGNOSIS — E66.09 CLASS 1 OBESITY DUE TO EXCESS CALORIES WITHOUT SERIOUS COMORBIDITY WITH BODY MASS INDEX (BMI) OF 34.0 TO 34.9 IN ADULT: ICD-10-CM

## 2025-04-28 DIAGNOSIS — E66.811 CLASS 1 OBESITY DUE TO EXCESS CALORIES WITHOUT SERIOUS COMORBIDITY WITH BODY MASS INDEX (BMI) OF 34.0 TO 34.9 IN ADULT: ICD-10-CM

## 2025-04-28 PROCEDURE — 3008F BODY MASS INDEX DOCD: CPT | Performed by: PHYSICIAN ASSISTANT

## 2025-04-28 PROCEDURE — 99213 OFFICE O/P EST LOW 20 MIN: CPT | Performed by: PHYSICIAN ASSISTANT

## 2025-04-28 RX ORDER — PHENTERMINE HYDROCHLORIDE 37.5 MG/1
37.5 TABLET ORAL
Qty: 30 TABLET | Refills: 0 | Status: SHIPPED | OUTPATIENT
Start: 2025-04-28 | End: 2025-05-28

## 2025-04-28 NOTE — PROGRESS NOTES
Subjective   Patient ID: Zainab Lagunas is a 55 y.o. female who presents for Follow-up.    HPI     Patient here for weight loss management on adipex  This is the start of month #2  Lost # 4 lbs since last visit   Tolerates well.   No insomnia, no anxiety, no palpitations, no jitters  Total weight loss is 6#.  She is lifting weights and is gaining muscle.  Clothes are fitting better.  BMI is still 32          Last Ov   patient is wondering if she can restart on Adipex to help with weight loss.  Her last controlled substance contract and UDS was May 2024.  She has been on Adipex several times in the past with success.  The lowest weight that she had gotten to in the past was in the 170s.  She is looking to lose 20 pounds this round of Adipex.     Current BMI: 33.3  Goal BMI is 27  She has to lose approx 30 # to get near goal  Pt is using [employer] yani, weigh self weekly, tracks meals and water.   Stopped snacking       OARRS:  Radha Quiroz PA-C on 4/28/2025  8:15 AM  I have personally reviewed the OARRS report for Zainab Lagunas. I have considered the risks of abuse, dependence, addiction and diversion and I believe that it is clinically appropriate for Zainab Lagunas to be prescribed this medication    Is the patient prescribed a combination of a benzodiazepine and opioid?  No    Last Urine Drug Screen / ordered today: No  Recent Results (from the past 8760 hours)   Drug Screen, Urine With Reflex to Confirmation    Collection Time: 03/31/25  9:18 AM   Result Value Ref Range    Amphetamines NEGATIVE <500 ng/mL    Barbiturates NEGATIVE <300 ng/mL    Benzodiazepines NEGATIVE <100 ng/mL    Cocaine Metabolite NEGATIVE <150 ng/mL    Marijuana Metabolite NEGATIVE <20 ng/mL    Methadone Metabolite NEGATIVE <100 ng/mL    Opiates NEGATIVE <100 ng/mL    Oxycodone NEGATIVE <100 ng/mL    Phencyclidine NEGATIVE <25 ng/mL    Creatinine 130.2 > or = 20.0 mg/dL    pH 6.5 4.5 - 9.0    Oxidant NEGATIVE <200 mcg/mL     Notes and Comments     Amphetamine Confirm, Urine    Collection Time: 05/08/24  9:44 AM   Result Value Ref Range    Methamphetamine Quant, Ur <200 ng/mL    MDA, Urine <200 ng/mL    MDEA, Urine <200 ng/mL    Phentermine,Urine >5000 ng/mL    Amphetamines,Urine <50 ng/mL    MDMA, Urine <200 ng/mL     Results are as expected.         Controlled Substance Agreement:  Date of the Last Agreement: 3/31/25  Reviewed Controlled Substance Agreement including but not limited to the benefits, risks, and alternatives to treatment with a Controlled Substance medication(s).    Anorexiants:   What is the patient's goal of therapy? Weight loss  Is this being achieved with current treatment? yes    I have assessed the patient's continuing efforts to lose weight., I have assessed the patient's dedication to the treatment program and the response to treatment., and I have assessed the presence or absence of contraindications, adverse effects, and indicators of possible substance abuse that would necessitate cessation of treatment utilizing controlled substance.    Patient has demonstrated continued efforts to lose weight, is dedicated to the treatment program and the response to treatment.    Activities of Daily Living:   Is your overall impression that this patient is benefiting (symptom reduction outweighs side effects) from anorexiants therapy? Yes     1. Physical Functioning: Better  2. Family Relationship: Better  3. Social Relationship: Better  4. Mood: Better  5. Sleep Patterns: Better  6. Overall Function: Better    Review of Systems  Constitutional: Patient denies any fever, chills, loss of appetite, or unexplained weight loss.  Cardiovascular: Patient denies any chest pain, shortness of breath with exertion, tachycardia, palpitations, orthopnea, or paroxysmal nocturnal dyspnea.  Respiratory: Patient denies any cough, shortness breath, or wheezing.  Gastrointestinal patient denies any nausea, vomiting, diarrhea, constipation,  "melena, hematochezia, or reflux symptoms  Skin: Denies any rashes or skin lesions   Neurology: Patient denies any new motor or sensory losses.  Denies any numbness, tingling, weakness, and incoordination of the extremities.  Patient also denies any tremor, seizures, or gait instability.  Endocrinology: Denies any polyuria, polydipsia, polyphagia, or heat/cold intolerance.    Objective   /84 (BP Location: Right arm, Patient Position: Sitting, BP Cuff Size: Adult)   Pulse 88   Temp 36.5 °C (97.7 °F) (Temporal)   Ht 1.6 m (5' 3\")   Wt 82.6 kg (182 lb)   SpO2 96%   BMI 32.24 kg/m²     Physical Exam  Gen. appearance: Alert and cooperative, no acute distress, well-developed, well-nourished obese female.  Neck: Supple and without adenopathy or rigidity.  There is no JVD at 90° and no carotid bruits are noted.  Cardiovascular: Heart has a regular rate and rhythm without murmur or ectopy.  Respiratory: Lungs are clear to auscultation bilaterally with good air exchange.      Assessment/Plan   Diagnoses and all orders for this visit:  Class 1 obesity due to excess calories without serious comorbidity with body mass index (BMI) of 34.0 to 34.9 in adult  -     Follow Up In [facility] - PCP - Established  -     Follow Up In [facility] - PCP - Established; Future  -     phentermine (Adipex-P) 37.5 mg tablet; Take 1 tablet (37.5 mg) by mouth once daily in the morning. Take before meals.  Lost 6# since her last visit, expected weight loss is 6 to 10 pounds in the next month.  Current BMI: 32  Goal BMI is 27  She has to lose approx 30 # to get near goal  Pt is using [employer] yani, weigh self weekly, tracks meals and water.   Stopped snacking, increased activity.  NO sweets  We discussed the risks/benefits/side effects of Adipex at length again. Sheunderstands similar medications have had adverse effects on heart valves in the past and there is no guarantee that Adipex cannot have similar side effects. We " discussed that the medication is potentially abusable and potentially addictive. We again discussed that she/he can only continue on the medication for 3 months before needing a 6 month drug-free interval. We discussed that we will need to discontinue the medication should she/he fail to lose weight.  I see no signs of substance abuse today  UDS signed 3/31/25  CSC signed 3/31/25      Patient is to return to the clinic in 1 month for a weight check and refill of her third month of Adipex.    Patient understands that should they have testing outside   facilities that we may not receive the results and was told to call us if they have not heard from our office within a week after testing.     Please be aware that any referrals discussed today should be placed today within our office.    Tests last labs ordered should also result in prompt scheduling today as you leave the office.  If not done today then a phone call for scheduling is expected in a timely manner-within 2 weeks.  If testing is done-a result should be available to patient within 2 weeks time unless otherwise specified.   You, the patient or caregiver, are responsible for making sure what is discussed is actually scheduled and completed.  If suboptimal understanding of results, tests, referral reasons occurs it is understood that a follow-up appointment with me should be made to discuss and clarify the understanding.  Follow-up at next scheduled visit as planned or discussed during visit is expected.  You are to return sooner if new or unresolved issues of concern occur.        [facility] uses voice recognition technology for dictations. Sometimes the software misinterprets words. Please take this into account when reading this.

## 2025-05-19 ASSESSMENT — ENCOUNTER SYMPTOMS
CHILLS: 0
VOMITING: 0
DYSURIA: 1
FLANK PAIN: 0
NAUSEA: 0
HEMATURIA: 0
SWEATS: 1
FREQUENCY: 1

## 2025-05-20 ENCOUNTER — OFFICE VISIT (OUTPATIENT)
Dept: PRIMARY CARE | Facility: CLINIC | Age: 55
End: 2025-05-20
Payer: COMMERCIAL

## 2025-05-20 ENCOUNTER — APPOINTMENT (OUTPATIENT)
Dept: PRIMARY CARE | Facility: CLINIC | Age: 55
End: 2025-05-20
Payer: COMMERCIAL

## 2025-05-20 VITALS
SYSTOLIC BLOOD PRESSURE: 123 MMHG | HEIGHT: 63 IN | HEART RATE: 94 BPM | BODY MASS INDEX: 32.34 KG/M2 | RESPIRATION RATE: 16 BRPM | WEIGHT: 182.5 LBS | OXYGEN SATURATION: 95 % | DIASTOLIC BLOOD PRESSURE: 86 MMHG | TEMPERATURE: 97.5 F

## 2025-05-20 DIAGNOSIS — R39.9 UTI SYMPTOMS: ICD-10-CM

## 2025-05-20 LAB
POC APPEARANCE, URINE: CLEAR
POC BILIRUBIN, URINE: NEGATIVE
POC BLOOD, URINE: ABNORMAL
POC COLOR, URINE: YELLOW
POC GLUCOSE, URINE: NEGATIVE MG/DL
POC KETONES, URINE: NEGATIVE MG/DL
POC LEUKOCYTES, URINE: ABNORMAL
POC NITRITE,URINE: POSITIVE
POC PH, URINE: 5.5 PH
POC PROTEIN, URINE: ABNORMAL MG/DL
POC SPECIFIC GRAVITY, URINE: >=1.03
POC UROBILINOGEN, URINE: 0.2 EU/DL

## 2025-05-20 PROCEDURE — 81003 URINALYSIS AUTO W/O SCOPE: CPT | Performed by: PHYSICIAN ASSISTANT

## 2025-05-20 PROCEDURE — 99213 OFFICE O/P EST LOW 20 MIN: CPT | Performed by: PHYSICIAN ASSISTANT

## 2025-05-20 PROCEDURE — 3008F BODY MASS INDEX DOCD: CPT | Performed by: PHYSICIAN ASSISTANT

## 2025-05-20 RX ORDER — NITROFURANTOIN 25; 75 MG/1; MG/1
100 CAPSULE ORAL 2 TIMES DAILY
Qty: 14 CAPSULE | Refills: 0 | Status: SHIPPED | OUTPATIENT
Start: 2025-05-20 | End: 2025-05-27

## 2025-05-20 ASSESSMENT — ENCOUNTER SYMPTOMS
CHILLS: 0
SWEATS: 1
FLANK PAIN: 0
NAUSEA: 0
HEMATURIA: 0
VOMITING: 0
FREQUENCY: 1
DYSURIA: 1

## 2025-05-20 NOTE — PROGRESS NOTES
"Subjective   Patient ID: Zainab Lagunas is a 55 y.o. female who presents for UTI.    Difficulty Urinating   This is a new problem. The current episode started in the past 7 days. The problem occurs every urination. The problem has been unchanged. The quality of the pain is described as aching and burning. The pain is at a severity of 3/10. The pain is mild. There has been no fever. She is Sexually active. There is No history of pyelonephritis. Associated symptoms include frequency, sweats and urgency. Pertinent negatives include no chills, discharge, flank pain, hematuria, hesitancy, nausea, possible pregnancy or vomiting.        Patient states burning and frequency.     Review of Systems   Constitutional:  Negative for chills.   Gastrointestinal:  Negative for nausea and vomiting.   Genitourinary:  Positive for dysuria, frequency and urgency. Negative for flank pain, hematuria and hesitancy.       Objective   /86   Pulse 94   Temp 36.4 °C (97.5 °F) (Temporal)   Resp 16   Ht 1.6 m (5' 3\")   Wt 82.8 kg (182 lb 8 oz)   SpO2 95%   BMI 32.33 kg/m²     Physical Exam  Gen. appearance: Alert and cooperative, no acute distress, well-developed/well-nourished woman.  Heart: Regular rate and rhythm without murmur or ectopy.  Lungs: Clear to auscultation bilaterally with good air exchange.  Abdomen: Soft and nondistended.  There is mild suprapubic tenderness with palpation.  No masses, guarding, rebound, or rigidity.  Bowel sounds are normal.  No abdominal bruits.  No CVA tenderness.    Assessment/Plan   Diagnoses and all orders for this visit:  UTI symptoms  -     Urine Culture  -     POCT UA Automated manually resulted  -     nitrofurantoin, macrocrystal-monohydrate, (Macrobid) 100 mg capsule; Take 1 capsule (100 mg) by mouth 2 times a day for 7 days.  Patient is with frequency and dysuria and her urinalysis showed leukocytes, nitrites, and blood.  Culture was sent.  She is to start on nitrofurantoin 100 mg " 1 p.o. twice daily x 7 days and we will send her urine out for culture.  She is currently without back pain or fever.  She was told if she does develop back pain or fever she is to call immediately or go to the emergency room for complete evaluation.  We will call her with culture results once they have been reviewed.     Other orders  -     Full code      Patient understands that should they have testing outside   facilities that we may not receive the results and was told to call us if they have not heard from our office within a week after testing.     Please be aware that any referrals discussed today should be placed today within our office.    Tests last labs ordered should also result in prompt scheduling today as you leave the office.  If not done today then a phone call for scheduling is expected in a timely manner-within 2 weeks.  If testing is done-a result should be available to patient within 2 weeks time unless otherwise specified.   You, the patient or caregiver, are responsible for making sure what is discussed is actually scheduled and completed.  If suboptimal understanding of results, tests, referral reasons occurs it is understood that a follow-up appointment with me should be made to discuss and clarify the understanding.  Follow-up at next scheduled visit as planned or discussed during visit is expected.  You are to return sooner if new or unresolved issues of concern occur.        [facility] uses voice recognition technology for dictations. Sometimes the software misinterprets words. Please take this into account when reading this.

## 2025-05-23 LAB — BACTERIA UR CULT: ABNORMAL

## 2025-05-28 ENCOUNTER — OFFICE VISIT (OUTPATIENT)
Dept: PRIMARY CARE | Facility: CLINIC | Age: 55
End: 2025-05-28
Payer: COMMERCIAL

## 2025-05-28 ENCOUNTER — APPOINTMENT (OUTPATIENT)
Dept: PRIMARY CARE | Facility: CLINIC | Age: 55
End: 2025-05-28
Payer: COMMERCIAL

## 2025-05-28 VITALS
HEIGHT: 63 IN | OXYGEN SATURATION: 96 % | SYSTOLIC BLOOD PRESSURE: 126 MMHG | DIASTOLIC BLOOD PRESSURE: 84 MMHG | TEMPERATURE: 97.5 F | RESPIRATION RATE: 16 BRPM | HEART RATE: 79 BPM | BODY MASS INDEX: 32.6 KG/M2 | WEIGHT: 184 LBS

## 2025-05-28 DIAGNOSIS — E66.09 CLASS 1 OBESITY DUE TO EXCESS CALORIES WITHOUT SERIOUS COMORBIDITY WITH BODY MASS INDEX (BMI) OF 34.0 TO 34.9 IN ADULT: Primary | ICD-10-CM

## 2025-05-28 DIAGNOSIS — E66.811 CLASS 1 OBESITY DUE TO EXCESS CALORIES WITHOUT SERIOUS COMORBIDITY WITH BODY MASS INDEX (BMI) OF 34.0 TO 34.9 IN ADULT: Primary | ICD-10-CM

## 2025-05-28 DIAGNOSIS — F32.89 OTHER DEPRESSION: ICD-10-CM

## 2025-05-28 DIAGNOSIS — N39.0 URINARY TRACT INFECTION WITHOUT HEMATURIA, SITE UNSPECIFIED: ICD-10-CM

## 2025-05-28 PROCEDURE — 3008F BODY MASS INDEX DOCD: CPT | Performed by: PHYSICIAN ASSISTANT

## 2025-05-28 PROCEDURE — 99213 OFFICE O/P EST LOW 20 MIN: CPT | Performed by: PHYSICIAN ASSISTANT

## 2025-05-28 RX ORDER — PHENTERMINE HYDROCHLORIDE 37.5 MG/1
37.5 TABLET ORAL
Qty: 30 TABLET | Refills: 0 | Status: SHIPPED | OUTPATIENT
Start: 2025-05-28 | End: 2025-06-27

## 2025-05-28 RX ORDER — BUPROPION HYDROCHLORIDE 150 MG/1
150 TABLET ORAL EVERY MORNING
Qty: 90 TABLET | Refills: 0 | Status: SHIPPED | OUTPATIENT
Start: 2025-05-28 | End: 2025-08-26

## 2025-05-28 RX ORDER — AMOXICILLIN AND CLAVULANATE POTASSIUM 875; 125 MG/1; MG/1
875 TABLET, FILM COATED ORAL 2 TIMES DAILY
Qty: 20 TABLET | Refills: 0 | Status: SHIPPED | OUTPATIENT
Start: 2025-05-28 | End: 2025-06-02

## 2025-05-28 NOTE — PROGRESS NOTES
Subjective   Patient ID: Zainab Lagunas is a 55 y.o. female who presents for Med Management.    HPI     Follow up 1 month Adipex  This is the start of month 3  UDS and CSA 3/31/25  Pt has lost 4 # based on home scale.  Current BMI: 32  Goal BMI is 27  She has to lose approx 30 # to get near goal  Pt is using [employer] yani, weigh self weekly, tracks meals and water.   Stopped snacking  NO major SE from use, no constipation, some dry mouth, no insomnia or HA.     OARRS:  Radha Quiroz PA-C on 5/28/2025  3:04 PM  I have personally reviewed the OARRS report for Zainab Lagunas. I have considered the risks of abuse, dependence, addiction and diversion and I believe that it is clinically appropriate for Zainab Lagunas to be prescribed this medication    Is the patient prescribed a combination of a benzodiazepine and opioid?  No    Last Urine Drug Screen / ordered today: Yes  Recent Results (from the past 8760 hours)   Drug Screen, Urine With Reflex to Confirmation    Collection Time: 03/31/25  9:18 AM   Result Value Ref Range    Amphetamines NEGATIVE <500 ng/mL    Barbiturates NEGATIVE <300 ng/mL    Benzodiazepines NEGATIVE <100 ng/mL    Cocaine Metabolite NEGATIVE <150 ng/mL    Marijuana Metabolite NEGATIVE <20 ng/mL    Methadone Metabolite NEGATIVE <100 ng/mL    Opiates NEGATIVE <100 ng/mL    Oxycodone NEGATIVE <100 ng/mL    Phencyclidine NEGATIVE <25 ng/mL    Creatinine 130.2 > or = 20.0 mg/dL    pH 6.5 4.5 - 9.0    Oxidant NEGATIVE <200 mcg/mL    Notes and Comments       Results are as expected.         Controlled Substance Agreement:  Date of the Last Agreement: 3/31/25  Reviewed Controlled Substance Agreement including but not limited to the benefits, risks, and alternatives to treatment with a Controlled Substance medication(s).    Anorexiants:   What is the patient's goal of therapy? Wt loss  Is this being achieved with current treatment? yes    I have assessed the patient's continuing efforts to  "lose weight., I have assessed the patient's dedication to the treatment program and the response to treatment., and I have assessed the presence or absence of contraindications, adverse effects, and indicators of possible substance abuse that would necessitate cessation of treatment utilizing controlled substance.    Patient has demonstrated continued efforts to lose weight, is dedicated to the treatment program and the response to treatment. and I have assessed for the presence or absence of contraindications, adverse effects, and indicators of possible substance abuse that would necessitate cessation of treatment utilizing controlled substance.    Activities of Daily Living:   Is your overall impression that this patient is benefiting (symptom reduction outweighs side effects) from anorexiants therapy? Yes     1. Physical Functioning: Better  2. Family Relationship: Better  3. Social Relationship: Better  4. Mood: Better  5. Sleep Patterns: Better  6. Overall Function: Better  Patient's lowest weight in the past several years is 170#.        Patient was also diagnosed with a UTI last Wednesday and was given Macrobid.  The urine culture showed inconclusive about whether or not Macrobid was effective at killing the bacteria.  She currently feels like there is still pelvic pressure and urinary frequency.    Depression-patient's Wellbutrin needs a refill today.  Her mood is stable.  PHQ-9 was 0 today.  No SI or HI.    Review of Systems  Constitutional: Patient denies any fever, chills, loss of appetite, or unexplained weight loss.  Cardiovascular: Denies any chest pain, shortness of breath with exertion, tachycardia, palpitations.  Respiratory: Patient denies any cough, shortness of breath, or wheezing.  Skin:  Denies any rashes or skin lesions.    Objective   /84   Pulse 79   Temp 36.4 °C (97.5 °F) (Temporal)   Resp 16   Ht 1.6 m (5' 3\")   Wt 83.5 kg (184 lb)   SpO2 96%   BMI 32.59 kg/m²     Physical " Exam  Gen. appearance: Alert and cooperative, no acute distress, well-developed, well-nourished obese female.  Neck: Supple and without adenopathy or rigidity.  There is no JVD at 90° and no carotid bruits are noted.  Cardiovascular: Heart has a regular rate and rhythm without murmur or ectopy.  Respiratory: Lungs are clear to auscultation bilaterally with good air exchange.      Assessment/Plan   Diagnoses and all orders for this visit:  Class 1 obesity due to excess calories without serious comorbidity with body mass index (BMI) of 34.0 to 34.9 in adult  -     Follow Up In [facility] - PCP - Established  -     phentermine (Adipex-P) 37.5 mg tablet; Take 1 tablet (37.5 mg) by mouth once daily in the morning. Take before meals.  -     Follow Up In [facility] - PCP - Established; Future  This is the start of month 3  UDS and CSA 3/31/25  Pt has lost 4 # based on home scale.  Current BMI: 32  Goal BMI is 27  She has to lose approx 30 # to get near goal  Pt is using [employer], weigh self weekly, tracks meals and water.   Stopped snacking  NO major SE from use, no constipation, some dry mouth, no insomnia or HA.   Patient will continue with current dose of Adipex and follow-up in 4 weeks.  OAARS rv'd  We discussed the risks/benefits/side effects of Adipex at length again. She/He understands similar medications have had adverse effects on heart valves in the past and there is no guarantee that Adipex cannot have similar side effects. We discussed that the medication is potentially abusable and potentially addictive. We again discussed that she/he can only continue on the medication for 3 months before needing a 6 month drug-free interval. We discussed that we will need to discontinue the medication should she/he fail to lose weight.  I see no signs of substance abuse today      Urinary tract infection without hematuria, site unspecified  -     amoxicillin-clavulanate (Augmentin) 875-125 mg tablet;  Take 1 tablet (875 mg) by mouth 2 times a day for 5 days.  Patient's urine culture showed to be indeterminate with the use of Macrobid so she will switch over to the Augmentin 1 p.o. twice daily for 5 days and call if symptoms have not 100% improved.  She is currently only with urine frequency and pelvic pressure.    Other depression  -     buPROPion XL (Wellbutrin XL) 150 mg 24 hr tablet; Take 1 tablet (150 mg) by mouth once daily in the morning. Do not crush, chew, or split.  Patient's mood is stable.  She has no SI or HI and her PHQ-9 is 0 today.  She is to continue current dosing Wellbutrin and we will continue to monitor.    Patient is to return to the clinic in 4 weeks for reevaluation of weight after being on Adipex now for the third month.    Patient understands that should they have testing outside   facilities that we may not receive the results and was told to call us if they have not heard from our office within a week after testing.     Please be aware that any referrals discussed today should be placed today within our office.    Tests last labs ordered should also result in prompt scheduling today as you leave the office.  If not done today then a phone call for scheduling is expected in a timely manner-within 2 weeks.  If testing is done-a result should be available to patient within 2 weeks time unless otherwise specified.   You, the patient or caregiver, are responsible for making sure what is discussed is actually scheduled and completed.  If suboptimal understanding of results, tests, referral reasons occurs it is understood that a follow-up appointment with me should be made to discuss and clarify the understanding.  Follow-up at next scheduled visit as planned or discussed during visit is expected.  You are to return sooner if new or unresolved issues of concern occur.        [facility] uses voice recognition technology for dictations. Sometimes the software  misinterprets words. Please take this into account when reading this.

## 2025-06-03 DIAGNOSIS — J30.1 SEASONAL ALLERGIC RHINITIS DUE TO POLLEN: ICD-10-CM

## 2025-06-03 RX ORDER — FLUTICASONE PROPIONATE 50 MCG
1 SPRAY, SUSPENSION (ML) NASAL DAILY
Qty: 16 G | Refills: 2 | Status: SHIPPED | OUTPATIENT
Start: 2025-06-03

## 2025-06-18 ENCOUNTER — APPOINTMENT (OUTPATIENT)
Dept: PRIMARY CARE | Facility: CLINIC | Age: 55
End: 2025-06-18
Payer: COMMERCIAL

## 2025-06-18 VITALS
HEART RATE: 83 BPM | WEIGHT: 182 LBS | DIASTOLIC BLOOD PRESSURE: 80 MMHG | SYSTOLIC BLOOD PRESSURE: 124 MMHG | RESPIRATION RATE: 16 BRPM | HEIGHT: 63 IN | OXYGEN SATURATION: 95 % | BODY MASS INDEX: 32.25 KG/M2 | TEMPERATURE: 97.4 F

## 2025-06-18 DIAGNOSIS — E66.09 CLASS 1 OBESITY DUE TO EXCESS CALORIES WITHOUT SERIOUS COMORBIDITY WITH BODY MASS INDEX (BMI) OF 34.0 TO 34.9 IN ADULT: ICD-10-CM

## 2025-06-18 DIAGNOSIS — E66.811 CLASS 1 OBESITY DUE TO EXCESS CALORIES WITHOUT SERIOUS COMORBIDITY WITH BODY MASS INDEX (BMI) OF 34.0 TO 34.9 IN ADULT: ICD-10-CM

## 2025-06-18 PROCEDURE — 3008F BODY MASS INDEX DOCD: CPT | Performed by: PHYSICIAN ASSISTANT

## 2025-06-18 PROCEDURE — 4004F PT TOBACCO SCREEN RCVD TLK: CPT | Performed by: PHYSICIAN ASSISTANT

## 2025-06-18 PROCEDURE — 99213 OFFICE O/P EST LOW 20 MIN: CPT | Performed by: PHYSICIAN ASSISTANT

## 2025-06-18 RX ORDER — PHENTERMINE HYDROCHLORIDE 37.5 MG/1
37.5 TABLET ORAL
Qty: 30 TABLET | Refills: 0 | Status: SHIPPED | OUTPATIENT
Start: 2025-06-18 | End: 2025-07-18

## 2025-06-18 NOTE — PROGRESS NOTES
Subjective   Patient ID: Zainab Lagunas is a 55 y.o. female who presents for 1 MONTH FOLLOW UP.    HPI     Patient is here for 1 month follow up on Adipex. Patient states she is feeling like she is at a standstill which is frustrating. Patient states she changed her eating habits and is exercising more often.     This is the end of month 3, beginning of month 4 on Adipex  UDS and CSA 3/31/25  Pt has lost 4 # based on home scale.  Current BMI: 32  Goal BMI is 27  Pt has lost 12# loss total.  She has to lose approx 28# # to get near goal  Pt is using [employer] yani, weigh self weekly, tracks meals and water.   Stopped snacking  NO major SE from use, no constipation, some dry mouth, no insomnia or HA.       OARRS:  No data recorded  I have personally reviewed the OARRS report for Zainab Lagunas. I have considered the risks of abuse, dependence, addiction and diversion and I believe that it is clinically appropriate for Zainab Lagunas to be prescribed this medication    Is the patient prescribed a combination of a benzodiazepine and opioid?  No    Last Urine Drug Screen / ordered today: No  Recent Results (from the past 8760 hours)   Drug Screen, Urine With Reflex to Confirmation    Collection Time: 03/31/25  9:18 AM   Result Value Ref Range    Amphetamines NEGATIVE <500 ng/mL    Barbiturates NEGATIVE <300 ng/mL    Benzodiazepines NEGATIVE <100 ng/mL    Cocaine Metabolite NEGATIVE <150 ng/mL    Marijuana Metabolite NEGATIVE <20 ng/mL    Methadone Metabolite NEGATIVE <100 ng/mL    Opiates NEGATIVE <100 ng/mL    Oxycodone NEGATIVE <100 ng/mL    Phencyclidine NEGATIVE <25 ng/mL    Creatinine 130.2 > or = 20.0 mg/dL    pH 6.5 4.5 - 9.0    Oxidant NEGATIVE <200 mcg/mL    Notes and Comments       Results are as expected.           Controlled Substance Agreement:  Date of the Last Agreement: 3/31/25  Reviewed Controlled Substance Agreement including but not limited to the benefits, risks, and alternatives to treatment  "with a Controlled Substance medication(s).    Anorexiants:   What is the patient's goal of therapy? Wt loss  Is this being achieved with current treatment? Yes, but slow    I have assessed the patient's continuing efforts to lose weight., I have assessed the patient's dedication to the treatment program and the response to treatment., and I have assessed the presence or absence of contraindications, adverse effects, and indicators of possible substance abuse that would necessitate cessation of treatment utilizing controlled substance.    Patient has demonstrated continued efforts to lose weight, is dedicated to the treatment program and the response to treatment. and I have assessed for the presence or absence of contraindications, adverse effects, and indicators of possible substance abuse that would necessitate cessation of treatment utilizing controlled substance.    Activities of Daily Living:   Is your overall impression that this patient is benefiting (symptom reduction outweighs side effects) from anorexiants therapy? Yes     1. Physical Functioning: Better  2. Family Relationship: Same  3. Social Relationship: Same  4. Mood: Same  5. Sleep Patterns: Same  6. Overall Function: Same      Review of Systems  Constitutional: Patient denies any fever, chills, loss of appetite, or unexplained weight loss.  Cardiovascular: Denies any chest pain, shortness of breath with exertion, tachycardia, palpitations.  Respiratory: Patient denies any cough, shortness of breath, or wheezing.  Skin:  Denies any rashes or skin lesions.    Objective   /80 (BP Location: Left arm, Patient Position: Sitting, BP Cuff Size: Large adult)   Pulse 83   Temp 36.3 °C (97.4 °F) (Temporal)   Resp 16   Ht 1.6 m (5' 3\")   Wt 82.6 kg (182 lb)   SpO2 95%   BMI 32.24 kg/m²     Physical Exam  Gen. appearance: Alert and cooperative, no acute distress, well-developed, well-nourished obese female.  Neck: Supple and without adenopathy or " rigidity.  There is no JVD at 90° and no carotid bruits are noted.  Cardiovascular: Heart has a regular rate and rhythm without murmur or ectopy.  Respiratory: Lungs are clear to auscultation bilaterally with good air exchange.      Assessment/Plan   Diagnoses and all orders for this visit:  Class 1 obesity due to excess calories without serious comorbidity with body mass index (BMI) of 34.0 to 34.9 in adult  -     Follow Up In [facility] - PCP - Established  -     phentermine (Adipex-P) 37.5 mg tablet; Take 1 tablet (37.5 mg) by mouth once daily in the morning. Take before meals.  -     Follow Up In [facility] - PCP - Established; Future  This is the end of month 3, beginning of month 4 on Adipex  UDS and CSA 3/31/25  Pt has lost 4 # based on home scale.  Current BMI: 32  Goal BMI is 27  Pt has lost 12# loss total.  She has to lose approx 28# # to get near goal  Pt is using [employer] yani, weigh self weekly, tracks meals and water.   Stopped snacking  NO major SE from use, no constipation, some dry mouth, no insomnia or HA.      Patient is to continue current dose of phentermine now that they have extended the guidelines.  She will return to clinic in 1 month for repeat weight check.  We discussed the risks/benefits/side effects of Adipex at length again. She/He understands similar medications have had adverse effects on heart valves in the past and there is no guarantee that Adipex cannot have similar side effects. We discussed that the medication is potentially abusable and potentially addictive. We again discussed that she/he can only continue on the medication for 3 months before needing a 6 month drug-free interval. We discussed that we will need to discontinue the medication should she/he fail to lose weight.  I see no signs of substance abuse today    Patient understands that should they have testing outside   facilities that we may not receive the results and was told to call us if they  have not heard from our office within a week after testing.     Please be aware that any referrals discussed today should be placed today within our office.    Tests last labs ordered should also result in prompt scheduling today as you leave the office.  If not done today then a phone call for scheduling is expected in a timely manner-within 2 weeks.  If testing is done-a result should be available to patient within 2 weeks time unless otherwise specified.   You, the patient or caregiver, are responsible for making sure what is discussed is actually scheduled and completed.  If suboptimal understanding of results, tests, referral reasons occurs it is understood that a follow-up appointment with me should be made to discuss and clarify the understanding.  Follow-up at next scheduled visit as planned or discussed during visit is expected.  You are to return sooner if new or unresolved issues of concern occur.        [facility] uses voice recognition technology for dictations. Sometimes the software misinterprets words. Please take this into account when reading this.

## 2025-06-25 ENCOUNTER — APPOINTMENT (OUTPATIENT)
Dept: OTOLARYNGOLOGY | Facility: CLINIC | Age: 55
End: 2025-06-25
Payer: COMMERCIAL

## 2025-06-25 DIAGNOSIS — R59.0 CERVICAL LYMPHADENOPATHY: ICD-10-CM

## 2025-06-25 DIAGNOSIS — E04.1 THYROID NODULE: Primary | ICD-10-CM

## 2025-06-25 DIAGNOSIS — M54.2 NECK PAIN: ICD-10-CM

## 2025-06-25 PROCEDURE — 99213 OFFICE O/P EST LOW 20 MIN: CPT | Performed by: OTOLARYNGOLOGY

## 2025-06-25 NOTE — PROGRESS NOTES
Subjective   Patient ID: Zainab Lagunas is a 55 y.o. female who presents for 6 MONTH FU ON NECK.    HPI  The patient returns, being seen for 6-month follow-up on neck. History of thyroid biopsy which showed benign follicular nodule and associated lymph node. She continues to report discomfort right lateral thyroid bed. This pain is intermittent, happening several times per week, rated 7-8/10 when it happens. This has not worsened or improved or changed in nature since last visit.     All remaining head neck inquiry otherwise negative.     Review of Systems   Constitutional: Negative.    HENT: Negative.     Respiratory: Negative.     Cardiovascular: Negative.    Neurological: Negative.      Physical Exam  General appearance: No acute distress. Normal facies. Symmetric facial movement. No gross lesions of the face are noted.  Ears:  The external ear structures appear normal. The ear canals patent and the tympanic membranes are intact without evidence of air-fluid levels, retraction, or congenital defects.    Nose:  Anterior rhinoscopy notes essentially a midline nasal septum. Examination is noted for normal healthy mucosal membranes without any evidence of lesions, polyps, or exudate.   Throat/Oral mucosa:  The tongue is normally mobile. There are no lesions on the gingiva, buccal, or oral mucosa. There are no oral cavity masses.  Neck:  The neck is negative for mass lymphadenopathy. The trachea and parotid are clear. Right thyroid is notably larger than left, tender to palpation. The salivary gland structures are grossly unremarkable.    Assessment/Plan   Thyroid nodule. Neck is tender to palpation today and right thyroid is notably larger than left, which is the same as on previous examination(s). Fortunately previous biopsy and CT neck were negative, but I do not like that she continues with these pain symptoms. We will order repeat Thyroid US and if that shows anything suspicious we will likely proceed with  right thyroidectomy.

## 2025-06-27 ENCOUNTER — HOSPITAL ENCOUNTER (OUTPATIENT)
Dept: RADIOLOGY | Facility: HOSPITAL | Age: 55
Discharge: HOME | End: 2025-06-27
Payer: COMMERCIAL

## 2025-06-27 DIAGNOSIS — E04.1 THYROID NODULE: ICD-10-CM

## 2025-06-27 PROCEDURE — 76536 US EXAM OF HEAD AND NECK: CPT

## 2025-06-27 PROCEDURE — 76536 US EXAM OF HEAD AND NECK: CPT | Performed by: RADIOLOGY

## 2025-07-21 ENCOUNTER — APPOINTMENT (OUTPATIENT)
Dept: PRIMARY CARE | Facility: CLINIC | Age: 55
End: 2025-07-21
Payer: COMMERCIAL

## 2025-07-21 VITALS
WEIGHT: 181.06 LBS | SYSTOLIC BLOOD PRESSURE: 117 MMHG | OXYGEN SATURATION: 95 % | HEART RATE: 87 BPM | TEMPERATURE: 96.6 F | BODY MASS INDEX: 32.08 KG/M2 | DIASTOLIC BLOOD PRESSURE: 78 MMHG | RESPIRATION RATE: 16 BRPM | HEIGHT: 63 IN

## 2025-07-21 DIAGNOSIS — S39.012A STRAIN OF LUMBAR REGION, INITIAL ENCOUNTER: ICD-10-CM

## 2025-07-21 DIAGNOSIS — E66.811 CLASS 1 OBESITY DUE TO EXCESS CALORIES WITHOUT SERIOUS COMORBIDITY WITH BODY MASS INDEX (BMI) OF 34.0 TO 34.9 IN ADULT: Primary | ICD-10-CM

## 2025-07-21 DIAGNOSIS — J45.20 MILD INTERMITTENT ASTHMA WITHOUT COMPLICATION (HHS-HCC): ICD-10-CM

## 2025-07-21 DIAGNOSIS — E66.09 CLASS 1 OBESITY DUE TO EXCESS CALORIES WITHOUT SERIOUS COMORBIDITY WITH BODY MASS INDEX (BMI) OF 34.0 TO 34.9 IN ADULT: Primary | ICD-10-CM

## 2025-07-21 PROCEDURE — 3008F BODY MASS INDEX DOCD: CPT | Performed by: PHYSICIAN ASSISTANT

## 2025-07-21 PROCEDURE — 99213 OFFICE O/P EST LOW 20 MIN: CPT | Performed by: PHYSICIAN ASSISTANT

## 2025-07-21 RX ORDER — METHYLPREDNISOLONE 4 MG/1
TABLET ORAL
Qty: 21 TABLET | Refills: 0 | Status: SHIPPED | OUTPATIENT
Start: 2025-07-21 | End: 2025-07-27

## 2025-07-21 RX ORDER — PHENTERMINE HYDROCHLORIDE 37.5 MG/1
37.5 TABLET ORAL
Qty: 30 TABLET | Refills: 0 | Status: SHIPPED | OUTPATIENT
Start: 2025-07-21 | End: 2025-08-20

## 2025-07-21 ASSESSMENT — ENCOUNTER SYMPTOMS
FEVER: 0
BOWEL INCONTINENCE: 0
WEIGHT LOSS: 0
PERIANAL NUMBNESS: 0
LEG PAIN: 0
PARESIS: 0
ABDOMINAL PAIN: 0
NUMBNESS: 0
BACK PAIN: 1
DYSURIA: 0
TINGLING: 0
WEAKNESS: 0
HEADACHES: 0
PARESTHESIAS: 0

## 2025-07-21 NOTE — PROGRESS NOTES
Subjective   Patient ID: Zainab Lagunas is a 55 y.o. female who presents for Med Management.      Acute issue:  Back Pain  This is a new problem. The current episode started 1 to 4 weeks ago. The problem occurs every several days. The problem is unchanged. The pain is present in the lumbar spine. The quality of the pain is described as burning and stabbing. The pain does not radiate. The pain is at a severity of 8/10. The pain is The same all the time. The symptoms are aggravated by bending, position, lying down, sitting and standing. Stiffness is present All day. Pertinent negatives include no abdominal pain, bladder incontinence, bowel incontinence, chest pain, dysuria, fever, headaches, leg pain, numbness, paresis, paresthesias, pelvic pain, perianal numbness, tingling, weakness or weight loss. Risk factors include history of cancer, history of osteoporosis, menopause and obesity.   OTC meds tried for this pain: Naproxen or pain cream.   No recent XR's.    Patient has a history of asthma.  It is currently well-controlled on current measures.  She denies recent albuterol use.      1 month follow up med management Adipex    Patient is here for 1 month follow up on Adipex. Patient states she is feeling like she is at a standstill which is frustrating. Patient states she changed her eating habits and is exercising more often.      This is the end of month 3, beginning of month 5 on Adipex  UDS and CSA 3/31/25  Pt has lost 4 # based on home scale, lost 1# based on our scale.  Last fill Adipex 6/18/25  Current BMI: 32  Goal BMI is 27  Pt has lost 13# loss total.  She has to lose approx 28# # to get near goal  Pt is using [employer] yani, weigh self weekly, tracks meals and water.   Stopped snacking  Lowest weight in past 5 yr was 170#  NO major SE from use, no constipation, some dry mouth, no insomnia or HA.       OARRS:  Radha Quiroz PA-C on 7/21/2025  3:14 PM  I have personally reviewed the OARRS report for  Zainab Lagunas. I have considered the risks of abuse, dependence, addiction and diversion and I believe that it is clinically appropriate for Zainab Lagunas to be prescribed this medication    Is the patient prescribed a combination of a benzodiazepine and opioid?  No    Last Urine Drug Screen / ordered today: No  Recent Results (from the past 8760 hours)   Drug Screen, Urine With Reflex to Confirmation    Collection Time: 03/31/25  9:18 AM   Result Value Ref Range    Amphetamines NEGATIVE <500 ng/mL    Barbiturates NEGATIVE <300 ng/mL    Benzodiazepines NEGATIVE <100 ng/mL    Cocaine Metabolite NEGATIVE <150 ng/mL    Marijuana Metabolite NEGATIVE <20 ng/mL    Methadone Metabolite NEGATIVE <100 ng/mL    Opiates NEGATIVE <100 ng/mL    Oxycodone NEGATIVE <100 ng/mL    Phencyclidine NEGATIVE <25 ng/mL    Creatinine 130.2 > or = 20.0 mg/dL    pH 6.5 4.5 - 9.0    Oxidant NEGATIVE <200 mcg/mL    Notes and Comments       Results are as expected.           Controlled Substance Agreement:  Date of the Last Agreement: 3/3125  Reviewed Controlled Substance Agreement including but not limited to the benefits, risks, and alternatives to treatment with a Controlled Substance medication(s).    Anorexiants:   What is the patient's goal of therapy? Wt loss  Is this being achieved with current treatment? Yes, but slow    I have assessed the patient's continuing efforts to lose weight., I have assessed the patient's dedication to the treatment program and the response to treatment., and I have assessed the presence or absence of contraindications, adverse effects, and indicators of possible substance abuse that would necessitate cessation of treatment utilizing controlled substance.    Patient has demonstrated continued efforts to lose weight, is dedicated to the treatment program and the response to treatment. and I have assessed for the presence or absence of contraindications, adverse effects, and indicators of possible  "substance abuse that would necessitate cessation of treatment utilizing controlled substance.    Activities of Daily Living:   Is your overall impression that this patient is benefiting (symptom reduction outweighs side effects) from anorexiants therapy? Yes     1. Physical Functioning: Better  2. Family Relationship: Better  3. Social Relationship: Better  4. Mood: Better  5. Sleep Patterns: Better  6. Overall Function: Better       Review of Systems   Constitutional:  Negative for fever and weight loss.   Cardiovascular:  Negative for chest pain.   Gastrointestinal:  Negative for abdominal pain and bowel incontinence.   Genitourinary:  Negative for bladder incontinence, dysuria and pelvic pain.   Musculoskeletal:  Positive for back pain.   Neurological:  Negative for tingling, weakness, numbness, headaches and paresthesias.       Objective   /78   Pulse 87   Temp 35.9 °C (96.6 °F) (Temporal)   Resp 16   Ht 1.6 m (5' 3\")   Wt 82.1 kg (181 lb 1 oz)   SpO2 95%   BMI 32.07 kg/m²     Physical Exam  Gen. appearance: Alert and cooperative, in no acute distress, well-developed, well-nourished obese female.  Neck: Supple and without adenopathy or rigidity.  There is no JVD at 90° and no carotid bruits are noted.  There is no thyromegaly, thyroid tenderness, or palpable thyroid nodules.  Heart: Regular rate and rhythm without murmur or ectopy.  Lungs: Lungs are clear to auscultation bilaterally with good air exchange.  Skin: Good turgor, moist, warm and without rashes or lesions.  Neurological exam: Alert and oriented ×3, no tremor, normal gait.  Extremities: No clubbing, cyanosis, or edema  Back: SLR+ R side at 40 degrees. - Patricks b/l, LROM with flexion and extension of lumbar spine. No vertebral tenderness, No CVA tenderness.  Lumbar paraspinal muscle spasm noted bilaterally.  Worse on the right.    Assessment/Plan   Diagnoses and all orders for this visit:  Class 1 obesity due to excess calories without " serious comorbidity with body mass index (BMI) of 34.0 to 34.9 in adult  -     Follow Up In [facility] - PCP - Established  -     phentermine (Adipex-P) 37.5 mg tablet; Take 1 tablet (37.5 mg) by mouth once daily in the morning. Take before meals.  -     Follow Up In [facility] - PCP - Established; Future  This is the end of month 3, beginning of month 4 on Adipex  UDS and CSA 3/31/25  Pt has lost 4 # based on home scale, 1 # based on our scale.  Current BMI: 32  Goal BMI is 27  Pt has lost 13# loss total.  She has to lose approx 27# to get near goal  Pt is using [employer] yani, weigh self weekly, tracks meals and water.   Stopped snacking  NO major SE from use, no constipation, some dry mouth, no insomnia or HA.   Patient is to continue current dose of phentermine now that they have extended the guidelines.  She will return to clinic in 1 month for repeat weight check.  We discussed the risks/benefits/side effects of Adipex at length again. She/He understands similar medications have had adverse effects on heart valves in the past and there is no guarantee that Adipex cannot have similar side effects. We discussed that the medication is potentially abusable and potentially addictive.  We discussed that we will need to discontinue the medication should she/he fail to lose weight.  I see no signs of substance abuse today      Strain of lumbar region, initial encounter  -     methylPREDNISolone (Medrol Dospak) 4 mg tablets; Take as directed on package.  -     XR lumbar spine 2-3 views; Future  Patient is to start a Medrol Dosepak.  She is also to do home stretching with her spouse who works in physical therapy.  X-rays were also ordered.  We will call her with those results as soon as they are reviewed.  Should her back pain still be present within the next month after physical therapy, conservative medical therapy we will consider an MRI.  She currently denies any bowel or bladder dysfunction.    Mild  intermittent asthma without complication (HHS-HCC)-symptoms are stable with current medications.  No recent albuterol use.    Patient is to return to clinic in 1 month for a follow-up for Adipex use.    Patient understands that should they have testing outside   facilities that we may not receive the results and was told to call us if they have not heard from our office within a week after testing.     Please be aware that any referrals discussed today should be placed today within our office.    Tests last labs ordered should also result in prompt scheduling today as you leave the office.  If not done today then a phone call for scheduling is expected in a timely manner-within 2 weeks.  If testing is done-a result should be available to patient within 2 weeks time unless otherwise specified.   You, the patient or caregiver, are responsible for making sure what is discussed is actually scheduled and completed.  If suboptimal understanding of results, tests, referral reasons occurs it is understood that a follow-up appointment with me should be made to discuss and clarify the understanding.  Follow-up at next scheduled visit as planned or discussed during visit is expected.  You are to return sooner if new or unresolved issues of concern occur.        [facility] uses voice recognition technology for dictations. Sometimes the software misinterprets words. Please take this into account when reading this.

## 2025-07-22 ENCOUNTER — HOSPITAL ENCOUNTER (OUTPATIENT)
Dept: RADIOLOGY | Facility: HOSPITAL | Age: 55
Discharge: HOME | End: 2025-07-22
Payer: COMMERCIAL

## 2025-07-22 DIAGNOSIS — S39.012A STRAIN OF LUMBAR REGION, INITIAL ENCOUNTER: ICD-10-CM

## 2025-07-22 PROCEDURE — 72100 X-RAY EXAM L-S SPINE 2/3 VWS: CPT | Performed by: STUDENT IN AN ORGANIZED HEALTH CARE EDUCATION/TRAINING PROGRAM

## 2025-07-22 PROCEDURE — 72100 X-RAY EXAM L-S SPINE 2/3 VWS: CPT

## 2025-08-25 ENCOUNTER — APPOINTMENT (OUTPATIENT)
Dept: PRIMARY CARE | Facility: CLINIC | Age: 55
End: 2025-08-25
Payer: COMMERCIAL

## 2025-08-27 ENCOUNTER — APPOINTMENT (OUTPATIENT)
Dept: PRIMARY CARE | Facility: CLINIC | Age: 55
End: 2025-08-27
Payer: COMMERCIAL

## 2025-08-27 VITALS
TEMPERATURE: 98.6 F | HEART RATE: 90 BPM | OXYGEN SATURATION: 98 % | BODY MASS INDEX: 31.89 KG/M2 | SYSTOLIC BLOOD PRESSURE: 123 MMHG | HEIGHT: 63 IN | WEIGHT: 180 LBS | DIASTOLIC BLOOD PRESSURE: 79 MMHG

## 2025-08-27 DIAGNOSIS — R73.09 ELEVATED GLUCOSE: ICD-10-CM

## 2025-08-27 DIAGNOSIS — E66.811 CLASS 1 OBESITY DUE TO EXCESS CALORIES WITHOUT SERIOUS COMORBIDITY WITH BODY MASS INDEX (BMI) OF 34.0 TO 34.9 IN ADULT: Primary | ICD-10-CM

## 2025-08-27 DIAGNOSIS — E66.09 CLASS 1 OBESITY DUE TO EXCESS CALORIES WITHOUT SERIOUS COMORBIDITY WITH BODY MASS INDEX (BMI) OF 34.0 TO 34.9 IN ADULT: Primary | ICD-10-CM

## 2025-08-27 DIAGNOSIS — E55.9 VITAMIN D DEFICIENCY: ICD-10-CM

## 2025-08-27 DIAGNOSIS — R10.32 LLQ PAIN: ICD-10-CM

## 2025-08-27 DIAGNOSIS — R79.89 ELEVATED LFTS: ICD-10-CM

## 2025-08-27 PROCEDURE — 99214 OFFICE O/P EST MOD 30 MIN: CPT | Performed by: PHYSICIAN ASSISTANT

## 2025-08-27 PROCEDURE — 3008F BODY MASS INDEX DOCD: CPT | Performed by: PHYSICIAN ASSISTANT

## 2025-08-27 PROCEDURE — 4004F PT TOBACCO SCREEN RCVD TLK: CPT | Performed by: PHYSICIAN ASSISTANT

## 2025-08-27 RX ORDER — PHENTERMINE HYDROCHLORIDE 37.5 MG/1
37.5 TABLET ORAL
Qty: 30 TABLET | Refills: 0 | Status: SHIPPED | OUTPATIENT
Start: 2025-08-27 | End: 2025-09-26

## 2025-08-27 ASSESSMENT — PATIENT HEALTH QUESTIONNAIRE - PHQ9
SUM OF ALL RESPONSES TO PHQ9 QUESTIONS 1 AND 2: 2
3. TROUBLE FALLING OR STAYING ASLEEP OR SLEEPING TOO MUCH: NOT AT ALL
SUM OF ALL RESPONSES TO PHQ QUESTIONS 1-9: 4
6. FEELING BAD ABOUT YOURSELF - OR THAT YOU ARE A FAILURE OR HAVE LET YOURSELF OR YOUR FAMILY DOWN: SEVERAL DAYS
1. LITTLE INTEREST OR PLEASURE IN DOING THINGS: SEVERAL DAYS
5. POOR APPETITE OR OVEREATING: NOT AT ALL
8. MOVING OR SPEAKING SO SLOWLY THAT OTHER PEOPLE COULD HAVE NOTICED. OR THE OPPOSITE, BEING SO FIGETY OR RESTLESS THAT YOU HAVE BEEN MOVING AROUND A LOT MORE THAN USUAL: NOT AT ALL
9. THOUGHTS THAT YOU WOULD BE BETTER OFF DEAD, OR OF HURTING YOURSELF: NOT AT ALL
2. FEELING DOWN, DEPRESSED OR HOPELESS: SEVERAL DAYS
4. FEELING TIRED OR HAVING LITTLE ENERGY: SEVERAL DAYS
7. TROUBLE CONCENTRATING ON THINGS, SUCH AS READING THE NEWSPAPER OR WATCHING TELEVISION: NOT AT ALL

## 2025-08-30 LAB
1,25(OH)2D SERPL-MCNC: NORMAL PG/ML
1,25(OH)2D2 SERPL-MCNC: NORMAL PG/ML
1,25(OH)2D3 SERPL-MCNC: NORMAL PG/ML
ALBUMIN SERPL-MCNC: 4.5 G/DL (ref 3.6–5.1)
ALP SERPL-CCNC: 62 U/L (ref 37–153)
ALT SERPL-CCNC: 17 U/L (ref 6–29)
ANION GAP SERPL CALCULATED.4IONS-SCNC: 8 MMOL/L (CALC) (ref 7–17)
AST SERPL-CCNC: 12 U/L (ref 10–35)
BASOPHILS # BLD AUTO: 72 CELLS/UL (ref 0–200)
BASOPHILS NFR BLD AUTO: 1 %
BILIRUB SERPL-MCNC: 0.7 MG/DL (ref 0.2–1.2)
BUN SERPL-MCNC: 18 MG/DL (ref 7–25)
CALCIUM SERPL-MCNC: 9.4 MG/DL (ref 8.6–10.4)
CHLORIDE SERPL-SCNC: 107 MMOL/L (ref 98–110)
CHOLEST SERPL-MCNC: 201 MG/DL
CHOLEST/HDLC SERPL: 4.5 (CALC)
CO2 SERPL-SCNC: 27 MMOL/L (ref 20–32)
CREAT SERPL-MCNC: 0.74 MG/DL (ref 0.5–1.03)
EGFRCR SERPLBLD CKD-EPI 2021: 96 ML/MIN/1.73M2
EOSINOPHIL # BLD AUTO: 216 CELLS/UL (ref 15–500)
EOSINOPHIL NFR BLD AUTO: 3 %
ERYTHROCYTE [DISTWIDTH] IN BLOOD BY AUTOMATED COUNT: 12.9 % (ref 11–15)
GLUCOSE SERPL-MCNC: 98 MG/DL (ref 65–99)
HCT VFR BLD AUTO: 42.2 % (ref 35–45)
HDLC SERPL-MCNC: 45 MG/DL
HGB BLD-MCNC: 13.7 G/DL (ref 11.7–15.5)
LDLC SERPL CALC-MCNC: 137 MG/DL (CALC)
LYMPHOCYTES # BLD AUTO: 1843 CELLS/UL (ref 850–3900)
LYMPHOCYTES NFR BLD AUTO: 25.6 %
MCH RBC QN AUTO: 31.6 PG (ref 27–33)
MCHC RBC AUTO-ENTMCNC: 32.5 G/DL (ref 32–36)
MCV RBC AUTO: 97.5 FL (ref 80–100)
MONOCYTES # BLD AUTO: 598 CELLS/UL (ref 200–950)
MONOCYTES NFR BLD AUTO: 8.3 %
NEUTROPHILS # BLD AUTO: 4471 CELLS/UL (ref 1500–7800)
NEUTROPHILS NFR BLD AUTO: 62.1 %
NONHDLC SERPL-MCNC: 156 MG/DL (CALC)
PLATELET # BLD AUTO: 268 THOUSAND/UL (ref 140–400)
PMV BLD REES-ECKER: 11.7 FL (ref 7.5–12.5)
POTASSIUM SERPL-SCNC: 4.8 MMOL/L (ref 3.5–5.3)
PROT SERPL-MCNC: 7.3 G/DL (ref 6.1–8.1)
RBC # BLD AUTO: 4.33 MILLION/UL (ref 3.8–5.1)
SODIUM SERPL-SCNC: 142 MMOL/L (ref 135–146)
TRIGL SERPL-MCNC: 86 MG/DL
TSH SERPL-ACNC: 2.3 MIU/L
WBC # BLD AUTO: 7.2 THOUSAND/UL (ref 3.8–10.8)

## 2025-09-02 ENCOUNTER — TELEPHONE (OUTPATIENT)
Dept: PRIMARY CARE | Facility: CLINIC | Age: 55
End: 2025-09-02
Payer: COMMERCIAL

## 2025-09-03 LAB
1,25(OH)2D SERPL-MCNC: 51 PG/ML (ref 18–72)
1,25(OH)2D2 SERPL-MCNC: <8 PG/ML
1,25(OH)2D3 SERPL-MCNC: 51 PG/ML
ALBUMIN SERPL-MCNC: 4.5 G/DL (ref 3.6–5.1)
ALP SERPL-CCNC: 62 U/L (ref 37–153)
ALT SERPL-CCNC: 17 U/L (ref 6–29)
ANION GAP SERPL CALCULATED.4IONS-SCNC: 8 MMOL/L (CALC) (ref 7–17)
AST SERPL-CCNC: 12 U/L (ref 10–35)
BASOPHILS # BLD AUTO: 72 CELLS/UL (ref 0–200)
BASOPHILS NFR BLD AUTO: 1 %
BILIRUB SERPL-MCNC: 0.7 MG/DL (ref 0.2–1.2)
BUN SERPL-MCNC: 18 MG/DL (ref 7–25)
CALCIUM SERPL-MCNC: 9.4 MG/DL (ref 8.6–10.4)
CHLORIDE SERPL-SCNC: 107 MMOL/L (ref 98–110)
CHOLEST SERPL-MCNC: 201 MG/DL
CHOLEST/HDLC SERPL: 4.5 (CALC)
CO2 SERPL-SCNC: 27 MMOL/L (ref 20–32)
CREAT SERPL-MCNC: 0.74 MG/DL (ref 0.5–1.03)
EGFRCR SERPLBLD CKD-EPI 2021: 96 ML/MIN/1.73M2
EOSINOPHIL # BLD AUTO: 216 CELLS/UL (ref 15–500)
EOSINOPHIL NFR BLD AUTO: 3 %
ERYTHROCYTE [DISTWIDTH] IN BLOOD BY AUTOMATED COUNT: 12.9 % (ref 11–15)
GLUCOSE SERPL-MCNC: 98 MG/DL (ref 65–99)
HCT VFR BLD AUTO: 42.2 % (ref 35–45)
HDLC SERPL-MCNC: 45 MG/DL
HGB BLD-MCNC: 13.7 G/DL (ref 11.7–15.5)
LDLC SERPL CALC-MCNC: 137 MG/DL (CALC)
LYMPHOCYTES # BLD AUTO: 1843 CELLS/UL (ref 850–3900)
LYMPHOCYTES NFR BLD AUTO: 25.6 %
MCH RBC QN AUTO: 31.6 PG (ref 27–33)
MCHC RBC AUTO-ENTMCNC: 32.5 G/DL (ref 32–36)
MCV RBC AUTO: 97.5 FL (ref 80–100)
MONOCYTES # BLD AUTO: 598 CELLS/UL (ref 200–950)
MONOCYTES NFR BLD AUTO: 8.3 %
NEUTROPHILS # BLD AUTO: 4471 CELLS/UL (ref 1500–7800)
NEUTROPHILS NFR BLD AUTO: 62.1 %
NONHDLC SERPL-MCNC: 156 MG/DL (CALC)
PLATELET # BLD AUTO: 268 THOUSAND/UL (ref 140–400)
PMV BLD REES-ECKER: 11.7 FL (ref 7.5–12.5)
POTASSIUM SERPL-SCNC: 4.8 MMOL/L (ref 3.5–5.3)
PROT SERPL-MCNC: 7.3 G/DL (ref 6.1–8.1)
RBC # BLD AUTO: 4.33 MILLION/UL (ref 3.8–5.1)
SODIUM SERPL-SCNC: 142 MMOL/L (ref 135–146)
TRIGL SERPL-MCNC: 86 MG/DL
TSH SERPL-ACNC: 2.3 MIU/L
WBC # BLD AUTO: 7.2 THOUSAND/UL (ref 3.8–10.8)

## 2025-09-12 ENCOUNTER — APPOINTMENT (OUTPATIENT)
Facility: HOSPITAL | Age: 55
End: 2025-09-12
Payer: COMMERCIAL

## 2025-09-24 ENCOUNTER — APPOINTMENT (OUTPATIENT)
Dept: PRIMARY CARE | Facility: CLINIC | Age: 55
End: 2025-09-24
Payer: COMMERCIAL